# Patient Record
Sex: MALE | Race: WHITE | NOT HISPANIC OR LATINO
[De-identification: names, ages, dates, MRNs, and addresses within clinical notes are randomized per-mention and may not be internally consistent; named-entity substitution may affect disease eponyms.]

---

## 2017-03-28 ENCOUNTER — APPOINTMENT (OUTPATIENT)
Dept: ENDOCRINOLOGY | Facility: CLINIC | Age: 62
End: 2017-03-28

## 2017-03-28 VITALS
OXYGEN SATURATION: 96 % | SYSTOLIC BLOOD PRESSURE: 120 MMHG | BODY MASS INDEX: 33.47 KG/M2 | DIASTOLIC BLOOD PRESSURE: 82 MMHG | WEIGHT: 226 LBS | HEIGHT: 69 IN | HEART RATE: 82 BPM

## 2017-03-28 LAB
GLUCOSE BLDC GLUCOMTR-MCNC: 101
HBA1C MFR BLD HPLC: 6.9

## 2017-03-29 LAB
25(OH)D3 SERPL-MCNC: 17.3 NG/ML
ALBUMIN SERPL ELPH-MCNC: 4.6 G/DL
ALP BLD-CCNC: 82 U/L
ALT SERPL-CCNC: 25 U/L
ANION GAP SERPL CALC-SCNC: 17 MMOL/L
AST SERPL-CCNC: 18 U/L
BILIRUB SERPL-MCNC: 0.3 MG/DL
BUN SERPL-MCNC: 20 MG/DL
CALCIUM SERPL-MCNC: 9.7 MG/DL
CHLORIDE SERPL-SCNC: 102 MMOL/L
CHOLEST SERPL-MCNC: 249 MG/DL
CHOLEST/HDLC SERPL: 5.2 RATIO
CO2 SERPL-SCNC: 25 MMOL/L
CREAT SERPL-MCNC: 1.11 MG/DL
CREAT SPEC-SCNC: 87 MG/DL
GLUCOSE SERPL-MCNC: 96 MG/DL
HDLC SERPL-MCNC: 48 MG/DL
LDLC SERPL CALC-MCNC: 141 MG/DL
MICROALBUMIN 24H UR DL<=1MG/L-MCNC: 13.5 MG/DL
MICROALBUMIN/CREAT 24H UR-RTO: 156 UG/MG
POTASSIUM SERPL-SCNC: 4.9 MMOL/L
PROT SERPL-MCNC: 7.8 G/DL
SODIUM SERPL-SCNC: 144 MMOL/L
T4 FREE SERPL-MCNC: 1.5 NG/DL
THYROGLOB AB SERPL-ACNC: <20 IU/ML
THYROGLOB SERPL-MCNC: <0.2 NG/ML
TRIGL SERPL-MCNC: 300 MG/DL
TSH SERPL-ACNC: 1.75 UIU/ML

## 2017-10-03 ENCOUNTER — APPOINTMENT (OUTPATIENT)
Dept: ENDOCRINOLOGY | Facility: CLINIC | Age: 62
End: 2017-10-03
Payer: COMMERCIAL

## 2017-10-03 VITALS
SYSTOLIC BLOOD PRESSURE: 130 MMHG | WEIGHT: 225 LBS | OXYGEN SATURATION: 98 % | BODY MASS INDEX: 33.33 KG/M2 | HEART RATE: 61 BPM | HEIGHT: 69 IN | DIASTOLIC BLOOD PRESSURE: 84 MMHG

## 2017-10-03 LAB
GLUCOSE BLDC GLUCOMTR-MCNC: 110
HBA1C MFR BLD HPLC: 6.7

## 2017-10-03 PROCEDURE — 99214 OFFICE O/P EST MOD 30 MIN: CPT | Mod: 25

## 2017-10-03 PROCEDURE — 83036 HEMOGLOBIN GLYCOSYLATED A1C: CPT | Mod: QW

## 2017-10-03 PROCEDURE — 82962 GLUCOSE BLOOD TEST: CPT

## 2017-10-04 LAB
25(OH)D3 SERPL-MCNC: 21.2 NG/ML
ALBUMIN SERPL ELPH-MCNC: 4.4 G/DL
ALP BLD-CCNC: 69 U/L
ALT SERPL-CCNC: 17 U/L
ANION GAP SERPL CALC-SCNC: 14 MMOL/L
AST SERPL-CCNC: 15 U/L
BASOPHILS # BLD AUTO: 0.03 K/UL
BASOPHILS NFR BLD AUTO: 0.3 %
BILIRUB SERPL-MCNC: 0.4 MG/DL
BUN SERPL-MCNC: 22 MG/DL
CALCIUM SERPL-MCNC: 9.6 MG/DL
CHLORIDE SERPL-SCNC: 102 MMOL/L
CHOLEST SERPL-MCNC: 219 MG/DL
CHOLEST/HDLC SERPL: 5.2 RATIO
CO2 SERPL-SCNC: 25 MMOL/L
CREAT SERPL-MCNC: 0.97 MG/DL
CREAT SPEC-SCNC: 90 MG/DL
EOSINOPHIL # BLD AUTO: 0.15 K/UL
EOSINOPHIL NFR BLD AUTO: 1.7 %
GLUCOSE SERPL-MCNC: 101 MG/DL
HCT VFR BLD CALC: 47.2 %
HDLC SERPL-MCNC: 42 MG/DL
HGB BLD-MCNC: 15.9 G/DL
IMM GRANULOCYTES NFR BLD AUTO: 0.1 %
LDLC SERPL CALC-MCNC: 131 MG/DL
LYMPHOCYTES # BLD AUTO: 3.15 K/UL
LYMPHOCYTES NFR BLD AUTO: 36.1 %
MAN DIFF?: NORMAL
MCHC RBC-ENTMCNC: 29.6 PG
MCHC RBC-ENTMCNC: 33.7 GM/DL
MCV RBC AUTO: 87.7 FL
MICROALBUMIN 24H UR DL<=1MG/L-MCNC: 8 MG/DL
MICROALBUMIN/CREAT 24H UR-RTO: 89 MG/G
MONOCYTES # BLD AUTO: 0.78 K/UL
MONOCYTES NFR BLD AUTO: 8.9 %
NEUTROPHILS # BLD AUTO: 4.6 K/UL
NEUTROPHILS NFR BLD AUTO: 52.9 %
PLATELET # BLD AUTO: 306 K/UL
POTASSIUM SERPL-SCNC: 4.8 MMOL/L
PROT SERPL-MCNC: 7.8 G/DL
RBC # BLD: 5.38 M/UL
RBC # FLD: 13.8 %
SODIUM SERPL-SCNC: 141 MMOL/L
T4 FREE SERPL-MCNC: 1.3 NG/DL
THYROGLOB AB SERPL-ACNC: <20 IU/ML
THYROGLOB SERPL-MCNC: <0.2 NG/ML
TRIGL SERPL-MCNC: 229 MG/DL
TSH SERPL-ACNC: 5.79 UIU/ML
WBC # FLD AUTO: 8.72 K/UL

## 2017-10-13 LAB
T4 FREE SERPL-MCNC: 1.4 NG/DL
TSH SERPL-ACNC: 2.52 UIU/ML

## 2018-05-21 ENCOUNTER — MEDICATION RENEWAL (OUTPATIENT)
Age: 63
End: 2018-05-21

## 2018-05-21 ENCOUNTER — APPOINTMENT (OUTPATIENT)
Dept: ENDOCRINOLOGY | Facility: CLINIC | Age: 63
End: 2018-05-21
Payer: COMMERCIAL

## 2018-05-21 VITALS
OXYGEN SATURATION: 95 % | DIASTOLIC BLOOD PRESSURE: 82 MMHG | BODY MASS INDEX: 33.18 KG/M2 | WEIGHT: 224 LBS | HEART RATE: 65 BPM | HEIGHT: 69 IN | SYSTOLIC BLOOD PRESSURE: 120 MMHG

## 2018-05-21 LAB
GLUCOSE BLDC GLUCOMTR-MCNC: 142
HBA1C MFR BLD HPLC: 7.1

## 2018-05-21 PROCEDURE — 99214 OFFICE O/P EST MOD 30 MIN: CPT | Mod: 25

## 2018-05-21 PROCEDURE — 83036 HEMOGLOBIN GLYCOSYLATED A1C: CPT | Mod: QW

## 2018-05-21 PROCEDURE — 82962 GLUCOSE BLOOD TEST: CPT

## 2018-05-21 RX ORDER — BLOOD SUGAR DIAGNOSTIC
STRIP MISCELLANEOUS
Qty: 1 | Refills: 2 | Status: DISCONTINUED | COMMUNITY
Start: 2018-05-21 | End: 2018-05-21

## 2018-05-21 RX ORDER — LANCETS
EACH MISCELLANEOUS
Qty: 1 | Refills: 2 | Status: DISCONTINUED | COMMUNITY
Start: 2018-05-21 | End: 2018-05-21

## 2018-05-21 RX ORDER — BLOOD-GLUCOSE METER
KIT MISCELLANEOUS
Qty: 1 | Refills: 0 | Status: ACTIVE | COMMUNITY
Start: 2018-05-21 | End: 1900-01-01

## 2018-05-21 RX ORDER — BLOOD SUGAR DIAGNOSTIC
STRIP MISCELLANEOUS DAILY
Qty: 1 | Refills: 1 | Status: ACTIVE | COMMUNITY
Start: 2018-05-21 | End: 1900-01-01

## 2018-05-21 RX ORDER — LANCETS 28 GAUGE
EACH MISCELLANEOUS
Qty: 1 | Refills: 3 | Status: ACTIVE | COMMUNITY
Start: 2018-05-21 | End: 1900-01-01

## 2018-05-22 LAB
25(OH)D3 SERPL-MCNC: 23.8 NG/ML
ALBUMIN SERPL ELPH-MCNC: 4.9 G/DL
ALP BLD-CCNC: 70 U/L
ALT SERPL-CCNC: 20 U/L
ANION GAP SERPL CALC-SCNC: 14 MMOL/L
AST SERPL-CCNC: 15 U/L
BILIRUB SERPL-MCNC: 0.5 MG/DL
BUN SERPL-MCNC: 23 MG/DL
CALCIUM SERPL-MCNC: 10 MG/DL
CHLORIDE SERPL-SCNC: 102 MMOL/L
CHOLEST SERPL-MCNC: 189 MG/DL
CHOLEST/HDLC SERPL: 4.1 RATIO
CO2 SERPL-SCNC: 28 MMOL/L
CREAT SERPL-MCNC: 1 MG/DL
CREAT SPEC-SCNC: 142 MG/DL
GLUCOSE SERPL-MCNC: 121 MG/DL
HDLC SERPL-MCNC: 46 MG/DL
LDLC SERPL CALC-MCNC: 115 MG/DL
MICROALBUMIN 24H UR DL<=1MG/L-MCNC: 11.2 MG/DL
MICROALBUMIN/CREAT 24H UR-RTO: 79 MG/G
POTASSIUM SERPL-SCNC: 4.8 MMOL/L
PROT SERPL-MCNC: 7.7 G/DL
SODIUM SERPL-SCNC: 144 MMOL/L
T4 FREE SERPL-MCNC: 1.7 NG/DL
THYROGLOB AB SERPL-ACNC: <20 IU/ML
THYROGLOB SERPL-MCNC: <0.2 NG/ML
TRIGL SERPL-MCNC: 140 MG/DL
TSH SERPL-ACNC: 0.44 UIU/ML

## 2018-10-23 ENCOUNTER — APPOINTMENT (OUTPATIENT)
Dept: ENDOCRINOLOGY | Facility: CLINIC | Age: 63
End: 2018-10-23
Payer: COMMERCIAL

## 2018-10-23 VITALS
SYSTOLIC BLOOD PRESSURE: 138 MMHG | WEIGHT: 225.2 LBS | DIASTOLIC BLOOD PRESSURE: 86 MMHG | BODY MASS INDEX: 33.36 KG/M2 | HEART RATE: 75 BPM | HEIGHT: 69 IN | OXYGEN SATURATION: 96 %

## 2018-10-23 VITALS — SYSTOLIC BLOOD PRESSURE: 120 MMHG | DIASTOLIC BLOOD PRESSURE: 80 MMHG

## 2018-10-23 LAB
GLUCOSE BLDC GLUCOMTR-MCNC: 207
HBA1C MFR BLD HPLC: 7.3

## 2018-10-23 PROCEDURE — 99214 OFFICE O/P EST MOD 30 MIN: CPT | Mod: 25

## 2018-10-23 PROCEDURE — 82962 GLUCOSE BLOOD TEST: CPT

## 2018-10-23 PROCEDURE — 83036 HEMOGLOBIN GLYCOSYLATED A1C: CPT | Mod: QW

## 2018-10-24 LAB
25(OH)D3 SERPL-MCNC: 26.7 NG/ML
ALBUMIN SERPL ELPH-MCNC: 4.6 G/DL
ALP BLD-CCNC: 70 U/L
ALT SERPL-CCNC: 22 U/L
ANION GAP SERPL CALC-SCNC: 14 MMOL/L
AST SERPL-CCNC: 16 U/L
BASOPHILS # BLD AUTO: 0.03 K/UL
BASOPHILS NFR BLD AUTO: 0.3 %
BILIRUB SERPL-MCNC: 0.3 MG/DL
BUN SERPL-MCNC: 20 MG/DL
CALCIUM SERPL-MCNC: 9.8 MG/DL
CHLORIDE SERPL-SCNC: 104 MMOL/L
CHOLEST SERPL-MCNC: 191 MG/DL
CHOLEST/HDLC SERPL: 4.4 RATIO
CO2 SERPL-SCNC: 25 MMOL/L
CREAT SERPL-MCNC: 0.87 MG/DL
CREAT SPEC-SCNC: 122 MG/DL
EOSINOPHIL # BLD AUTO: 0.26 K/UL
EOSINOPHIL NFR BLD AUTO: 2.9 %
GLUCOSE SERPL-MCNC: 160 MG/DL
HCT VFR BLD CALC: 45 %
HDLC SERPL-MCNC: 43 MG/DL
HGB BLD-MCNC: 15.5 G/DL
IMM GRANULOCYTES NFR BLD AUTO: 0.1 %
LDLC SERPL CALC-MCNC: 114 MG/DL
LYMPHOCYTES # BLD AUTO: 2.68 K/UL
LYMPHOCYTES NFR BLD AUTO: 30 %
MAN DIFF?: NORMAL
MCHC RBC-ENTMCNC: 28.7 PG
MCHC RBC-ENTMCNC: 34.4 GM/DL
MCV RBC AUTO: 83.2 FL
MICROALBUMIN 24H UR DL<=1MG/L-MCNC: 11.1 MG/DL
MICROALBUMIN/CREAT 24H UR-RTO: 91 MG/G
MONOCYTES # BLD AUTO: 0.81 K/UL
MONOCYTES NFR BLD AUTO: 9.1 %
NEUTROPHILS # BLD AUTO: 5.15 K/UL
NEUTROPHILS NFR BLD AUTO: 57.6 %
PLATELET # BLD AUTO: 434 K/UL
POTASSIUM SERPL-SCNC: 5.1 MMOL/L
PROT SERPL-MCNC: 7.6 G/DL
RBC # BLD: 5.41 M/UL
RBC # FLD: 13.5 %
SODIUM SERPL-SCNC: 143 MMOL/L
T4 FREE SERPL-MCNC: 1.8 NG/DL
THYROGLOB AB SERPL-ACNC: <20 IU/ML
THYROGLOB SERPL-MCNC: <0.2 NG/ML
TRIGL SERPL-MCNC: 169 MG/DL
TSH SERPL-ACNC: 0.71 UIU/ML
WBC # FLD AUTO: 8.94 K/UL

## 2018-11-26 ENCOUNTER — APPOINTMENT (OUTPATIENT)
Dept: CT IMAGING | Facility: IMAGING CENTER | Age: 63
End: 2018-11-26
Payer: COMMERCIAL

## 2018-11-26 ENCOUNTER — OUTPATIENT (OUTPATIENT)
Dept: OUTPATIENT SERVICES | Facility: HOSPITAL | Age: 63
LOS: 1 days | End: 2018-11-26
Payer: COMMERCIAL

## 2018-11-26 DIAGNOSIS — Z00.8 ENCOUNTER FOR OTHER GENERAL EXAMINATION: ICD-10-CM

## 2018-11-26 PROCEDURE — 74178 CT ABD&PLV WO CNTR FLWD CNTR: CPT

## 2018-11-26 PROCEDURE — 74178 CT ABD&PLV WO CNTR FLWD CNTR: CPT | Mod: 26

## 2018-11-26 PROCEDURE — 82565 ASSAY OF CREATININE: CPT

## 2019-04-08 ENCOUNTER — APPOINTMENT (OUTPATIENT)
Dept: ENDOCRINOLOGY | Facility: CLINIC | Age: 64
End: 2019-04-08
Payer: COMMERCIAL

## 2019-04-08 VITALS
SYSTOLIC BLOOD PRESSURE: 110 MMHG | HEIGHT: 69 IN | HEART RATE: 65 BPM | DIASTOLIC BLOOD PRESSURE: 70 MMHG | WEIGHT: 227 LBS | BODY MASS INDEX: 33.62 KG/M2 | OXYGEN SATURATION: 96 %

## 2019-04-08 LAB
GLUCOSE BLDC GLUCOMTR-MCNC: 190
HBA1C MFR BLD HPLC: 8.3

## 2019-04-08 PROCEDURE — 99214 OFFICE O/P EST MOD 30 MIN: CPT | Mod: 25

## 2019-04-08 PROCEDURE — 83036 HEMOGLOBIN GLYCOSYLATED A1C: CPT | Mod: QW

## 2019-04-08 PROCEDURE — 82962 GLUCOSE BLOOD TEST: CPT

## 2019-04-08 NOTE — ASSESSMENT
[Carbohydrate Consistent Diet] : carbohydrate consistent diet [Diabetes Foot Care] : diabetes foot care [Long Term Vascular Complications] : long term vascular complications of diabetes [FreeTextEntry1] : 63 y.o. male with h/o Type 2 DM, hypothyroidism s/p surgery and I131 for papillary thyroid cancer. \par 1. Type 2 DM- Suboptimal control with Hba1c of 8.3%. Encouraged carbohydrate consistent diet and exercise. Will start Metformin  mg daily and titrate to 1,000 mg daily. Reviewed GI side effects. Recommend SMBG. Reviewed blood glucose targets.  Will check urine microalb/cr ratio and CMP.\par 2. BP is at goal and will continue Lisinopril for renal protection\par 3. Will check lipids and discussed statin for CV risk reduction. Patient declines statin at this time.\par 4. Hypothyroidism/papillary thyroid cancer- Clinically stable. Will check TFTs and Tg level. Goal TSH is below 1.5 to 2.0. Will adjust Synthroid accordingly. UTD with neck ultrasound in October 2016 was unremarkable and will repeat in 10/2019. \par 5. Vitamin D def- Will check 25 vitamin D level and adjust supplement if needed\par \par If stable, follow up in 3 to 4 months \par Follow up with podiatry and recommend treatment of tinea pedis\par  [Smoking Cessation] : smoking cessation

## 2019-04-08 NOTE — REVIEW OF SYSTEMS
[Negative] : Gastrointestinal [Pain/Numbness of Digits] : pain/numbness of digits [Polydipsia] : no polydipsia [Swelling] : no swelling

## 2019-04-08 NOTE — PHYSICAL EXAM
[Alert] : alert [No Acute Distress] : no acute distress [Normal Sclera/Conjunctiva] : normal sclera/conjunctiva [EOMI] : extra ocular movement intact [Supple] : the neck was supple [No LAD] : no lymphadenopathy [Well Healed Scar] : well healed scar [No Accessory Muscle Use] : no accessory muscle use [Clear to Auscultation] : lungs were clear to auscultation bilaterally [Normal S1, S2] : normal S1 and S2 [Regular Rhythm] : with a regular rhythm [Pedal Pulses Normal] : the pedal pulses are present [No Edema] : there was no peripheral edema [Normal Bowel Sounds] : normal bowel sounds [Not Tender] : non-tender [Soft] : abdomen soft [Not Distended] : not distended [No Clubbing, Cyanosis] : no clubbing  or cyanosis of the fingernails [No Rash] : no rash [Right Foot Was Examined] : right foot ~C was examined [Left Foot Was Examined] : left foot ~C was examined [Normal] : normal [2+] : 2+ in the dorsalis pedis [Normal Reflexes] : deep tendon reflexes were 2+ and symmetric [Normal Affect] : the affect was normal [Normal Mood] : the mood was normal [Diminished Throughout Both Feet] : normal tactile sensation with monofilament testing throughout both feet [FreeTextEntry1] : callus [FreeTextEntry2] : tinea pedis [FreeTextEntry5] : callus [FreeTextEntry6] : great toe onychomycosis and tinea pedis

## 2019-04-08 NOTE — HISTORY OF PRESENT ILLNESS
[FreeTextEntry1] : 63 y.o. male with h/o hypothyroidism s/p surgery for papillary thyroid cancer and Type 2 DM here for follow up visit. \par \par In regards to DM, no polyuria and no polydipsia. Not monitoring FS at home. Diet could better in regards to portions. Enjoys wine especially while in [place] recently.  UTD with optho and no retinopathy. Reports foot pain and using inserts. Does have podiatrist. Taking ACE-I for proteinuria but does forget sometimes. For breakfast, scooped out bagel with cream cheese and coffee with no sugar. For lunch  pizza or hamburger but sometimes skips. For dinner: Less pasta and rice. No sodas or juices. Likes fruits at night. Stop smoking for a few months but now smoking again. Does lots of walking. \par \par In regards to thyroid disease, taking Synthroid 175 mcg daily. No neck complaints. Had unremarkable neck ultrasound on 10/14/16. Had total thyroidectomy and  I131 in 1999. \par \par In regards to vitamin D def, was taking vitamin D3 2,000 Iu daily but ran out 2 to 3 months ago.

## 2019-04-09 LAB
25(OH)D3 SERPL-MCNC: 22.3 NG/ML
ALBUMIN SERPL ELPH-MCNC: 4.6 G/DL
ALP BLD-CCNC: 77 U/L
ALT SERPL-CCNC: 20 U/L
ANION GAP SERPL CALC-SCNC: 11 MMOL/L
AST SERPL-CCNC: 20 U/L
BASOPHILS # BLD AUTO: 0.05 K/UL
BASOPHILS NFR BLD AUTO: 0.6 %
BILIRUB SERPL-MCNC: 0.5 MG/DL
BUN SERPL-MCNC: 16 MG/DL
CALCIUM SERPL-MCNC: 9.8 MG/DL
CHLORIDE SERPL-SCNC: 103 MMOL/L
CHOLEST SERPL-MCNC: 193 MG/DL
CHOLEST/HDLC SERPL: 4.2 RATIO
CO2 SERPL-SCNC: 26 MMOL/L
CREAT SERPL-MCNC: 0.93 MG/DL
CREAT SPEC-SCNC: 169 MG/DL
EOSINOPHIL # BLD AUTO: 0.3 K/UL
EOSINOPHIL NFR BLD AUTO: 3.7 %
GLUCOSE SERPL-MCNC: 187 MG/DL
HCT VFR BLD CALC: 47.7 %
HDLC SERPL-MCNC: 46 MG/DL
HGB BLD-MCNC: 15.6 G/DL
IMM GRANULOCYTES NFR BLD AUTO: 0.1 %
LDLC SERPL CALC-MCNC: 123 MG/DL
LYMPHOCYTES # BLD AUTO: 2.71 K/UL
LYMPHOCYTES NFR BLD AUTO: 33.3 %
MAN DIFF?: NORMAL
MCHC RBC-ENTMCNC: 29 PG
MCHC RBC-ENTMCNC: 32.7 GM/DL
MCV RBC AUTO: 88.7 FL
MICROALBUMIN 24H UR DL<=1MG/L-MCNC: 10 MG/DL
MICROALBUMIN/CREAT 24H UR-RTO: 59 MG/G
MONOCYTES # BLD AUTO: 0.87 K/UL
MONOCYTES NFR BLD AUTO: 10.7 %
NEUTROPHILS # BLD AUTO: 4.2 K/UL
NEUTROPHILS NFR BLD AUTO: 51.6 %
PLATELET # BLD AUTO: 313 K/UL
POTASSIUM SERPL-SCNC: 5 MMOL/L
PROT SERPL-MCNC: 7.3 G/DL
RBC # BLD: 5.38 M/UL
RBC # FLD: 13.1 %
SODIUM SERPL-SCNC: 140 MMOL/L
T4 FREE SERPL-MCNC: 1.6 NG/DL
THYROGLOB AB SERPL-ACNC: <20 IU/ML
THYROGLOB SERPL-MCNC: <0.2 NG/ML
TRIGL SERPL-MCNC: 120 MG/DL
TSH SERPL-ACNC: 0.5 UIU/ML
WBC # FLD AUTO: 8.14 K/UL

## 2019-05-28 ENCOUNTER — INBOUND DOCUMENT (OUTPATIENT)
Age: 64
End: 2019-05-28

## 2019-10-02 ENCOUNTER — RX RENEWAL (OUTPATIENT)
Age: 64
End: 2019-10-02

## 2019-10-25 ENCOUNTER — APPOINTMENT (OUTPATIENT)
Dept: ENDOCRINOLOGY | Facility: CLINIC | Age: 64
End: 2019-10-25
Payer: COMMERCIAL

## 2019-10-25 VITALS
WEIGHT: 223 LBS | OXYGEN SATURATION: 98 % | HEART RATE: 74 BPM | DIASTOLIC BLOOD PRESSURE: 70 MMHG | BODY MASS INDEX: 33.03 KG/M2 | HEIGHT: 69 IN | SYSTOLIC BLOOD PRESSURE: 120 MMHG

## 2019-10-25 LAB
GLUCOSE BLDC GLUCOMTR-MCNC: 173
HBA1C MFR BLD HPLC: 6.9

## 2019-10-25 PROCEDURE — 83036 HEMOGLOBIN GLYCOSYLATED A1C: CPT | Mod: QW

## 2019-10-25 PROCEDURE — 82962 GLUCOSE BLOOD TEST: CPT

## 2019-10-25 PROCEDURE — 99214 OFFICE O/P EST MOD 30 MIN: CPT

## 2019-10-25 NOTE — HISTORY OF PRESENT ILLNESS
[FreeTextEntry1] : 63 y.o. male with h/o hypothyroidism s/p surgery for papillary thyroid cancer and Type 2 DM here for follow up visit. \par \par In regards to DM, no polyuria and no polydipsia. Not monitoring FS at home. Taking Metformin ER 1,000 mg daily. Diet could better in regards to portions. Enjoys wine especially while in [place].  UTD with optho and no retinopathy. Reports foot pain and using inserts. Does have podiatrist. Taking ACE-I for proteinuria but does forget sometimes. For breakfast, no more bagles and has rye with ege whites and coffee with no sugar. For lunch:  pizza or hamburger but sometimes skips. For dinner: Less pasta and rice. No sodas or juices. Likes fruits at night. Still smoking. Does lots of walking. \par \par In regards to thyroid disease, taking Synthroid 175 mcg daily. No neck complaints. Had unremarkable neck ultrasound on 10/14/16. Had total thyroidectomy and  I131 in 1999. \par \par In regards to vitamin D def, was taking vitamin D3 2,000 Iu daily but ran out 6 months ago.

## 2019-10-25 NOTE — PHYSICAL EXAM
[Alert] : alert [No Acute Distress] : no acute distress [EOMI] : extra ocular movement intact [Supple] : the neck was supple [Normal Sclera/Conjunctiva] : normal sclera/conjunctiva [Well Healed Scar] : well healed scar [No LAD] : no lymphadenopathy [Normal S1, S2] : normal S1 and S2 [No Accessory Muscle Use] : no accessory muscle use [Clear to Auscultation] : lungs were clear to auscultation bilaterally [Pedal Pulses Normal] : the pedal pulses are present [Regular Rhythm] : with a regular rhythm [No Edema] : there was no peripheral edema [Normal Bowel Sounds] : normal bowel sounds [Not Distended] : not distended [Soft] : abdomen soft [Not Tender] : non-tender [No Clubbing, Cyanosis] : no clubbing  or cyanosis of the fingernails [No Rash] : no rash [Right Foot Was Examined] : right foot ~C was examined [Left Foot Was Examined] : left foot ~C was examined [Normal] : normal [2+] : 2+ in the dorsalis pedis [Diminished Throughout Both Feet] : normal tactile sensation with monofilament testing throughout both feet [Normal Reflexes] : deep tendon reflexes were 2+ and symmetric [Normal Mood] : the mood was normal [Normal Affect] : the affect was normal [FreeTextEntry2] : tinea pedis [FreeTextEntry1] : callus [FreeTextEntry5] : callus [FreeTextEntry6] : great toe onychomycosis and tinea pedis

## 2019-10-25 NOTE — ASSESSMENT
[FreeTextEntry1] : 64 y.o. male with h/o Type 2 DM, hypothyroidism s/p surgery and I131 for papillary thyroid cancer. \par 1. Type 2 DM- Good control with Hba1c of 6.9% today. Encouraged carbohydrate consistent diet and exercise. Will continue Metformin ER 1,000 mg daily. Reviewed GI side effects. Recommend SMBG. Reviewed blood glucose targets.  Will check urine microalb/cr ratio and CMP.\par 2. BP is at goal and will continue Lisinopril for renal protection\par 3. Will check lipids and discussed statin for CV risk reduction. Patient declines statin at this time.\par 4. Hypothyroidism/papillary thyroid cancer- Clinically stable. Will check TFTs and Tg level. Goal TSH is below 1.5 to 2.0. Will adjust Synthroid accordingly. UTD with neck ultrasound in October 2016 was unremarkable and will repeat ultrasound now. \par 5. Vitamin D def- Will check 25 vitamin D level and adjust supplement if needed\par \par If stable, follow up in 6 months \par Follow up with podiatry and recommend treatment of tinea pedis\par  [Diabetes Foot Care] : diabetes foot care [Carbohydrate Consistent Diet] : carbohydrate consistent diet [Smoking Cessation] : smoking cessation [Long Term Vascular Complications] : long term vascular complications of diabetes

## 2019-10-25 NOTE — REVIEW OF SYSTEMS
[Recent Weight Loss (___ Lbs)] : recent [unfilled] ~Ulb weight loss [Constipation] : constipation [Pain/Numbness of Digits] : pain/numbness of digits [Polydipsia] : no polydipsia [Swelling] : no swelling [Negative] : Respiratory

## 2019-10-28 LAB
25(OH)D3 SERPL-MCNC: 16.9 NG/ML
ALBUMIN SERPL ELPH-MCNC: 4.7 G/DL
ALP BLD-CCNC: 88 U/L
ALT SERPL-CCNC: 18 U/L
ANION GAP SERPL CALC-SCNC: 15 MMOL/L
AST SERPL-CCNC: 16 U/L
BASOPHILS # BLD AUTO: 0.05 K/UL
BASOPHILS NFR BLD AUTO: 0.6 %
BILIRUB SERPL-MCNC: 0.2 MG/DL
BUN SERPL-MCNC: 21 MG/DL
CALCIUM SERPL-MCNC: 9.3 MG/DL
CHLORIDE SERPL-SCNC: 103 MMOL/L
CHOLEST SERPL-MCNC: 176 MG/DL
CHOLEST/HDLC SERPL: 4.5 RATIO
CO2 SERPL-SCNC: 22 MMOL/L
CREAT SERPL-MCNC: 0.8 MG/DL
CREAT SPEC-SCNC: 86 MG/DL
EOSINOPHIL # BLD AUTO: 0.37 K/UL
EOSINOPHIL NFR BLD AUTO: 4.6 %
GLUCOSE SERPL-MCNC: 180 MG/DL
HCT VFR BLD CALC: 44.6 %
HDLC SERPL-MCNC: 39 MG/DL
HGB BLD-MCNC: 15.1 G/DL
IMM GRANULOCYTES NFR BLD AUTO: 0.2 %
LDLC SERPL CALC-MCNC: 85 MG/DL
LYMPHOCYTES # BLD AUTO: 2.3 K/UL
LYMPHOCYTES NFR BLD AUTO: 28.6 %
MAN DIFF?: NORMAL
MCHC RBC-ENTMCNC: 29.2 PG
MCHC RBC-ENTMCNC: 33.9 GM/DL
MCV RBC AUTO: 86.1 FL
MICROALBUMIN 24H UR DL<=1MG/L-MCNC: 6.4 MG/DL
MICROALBUMIN/CREAT 24H UR-RTO: 74 MG/G
MONOCYTES # BLD AUTO: 0.79 K/UL
MONOCYTES NFR BLD AUTO: 9.8 %
NEUTROPHILS # BLD AUTO: 4.52 K/UL
NEUTROPHILS NFR BLD AUTO: 56.2 %
PLATELET # BLD AUTO: 273 K/UL
POTASSIUM SERPL-SCNC: 4.6 MMOL/L
PROT SERPL-MCNC: 7.3 G/DL
RBC # BLD: 5.18 M/UL
RBC # FLD: 12.8 %
SODIUM SERPL-SCNC: 140 MMOL/L
T4 FREE SERPL-MCNC: 1.4 NG/DL
THYROGLOB AB SERPL-ACNC: <20 IU/ML
THYROGLOB SERPL-MCNC: <0.2 NG/ML
TRIGL SERPL-MCNC: 262 MG/DL
TSH SERPL-ACNC: 0.32 UIU/ML
WBC # FLD AUTO: 8.05 K/UL

## 2019-12-03 ENCOUNTER — FORM ENCOUNTER (OUTPATIENT)
Age: 64
End: 2019-12-03

## 2019-12-04 ENCOUNTER — APPOINTMENT (OUTPATIENT)
Dept: ULTRASOUND IMAGING | Facility: IMAGING CENTER | Age: 64
End: 2019-12-04
Payer: COMMERCIAL

## 2019-12-04 ENCOUNTER — OUTPATIENT (OUTPATIENT)
Dept: OUTPATIENT SERVICES | Facility: HOSPITAL | Age: 64
LOS: 1 days | End: 2019-12-04
Payer: COMMERCIAL

## 2019-12-04 DIAGNOSIS — C73 MALIGNANT NEOPLASM OF THYROID GLAND: ICD-10-CM

## 2019-12-04 PROCEDURE — 76536 US EXAM OF HEAD AND NECK: CPT | Mod: 26

## 2019-12-04 PROCEDURE — 76536 US EXAM OF HEAD AND NECK: CPT

## 2020-03-12 ENCOUNTER — RX RENEWAL (OUTPATIENT)
Age: 65
End: 2020-03-12

## 2020-05-20 ENCOUNTER — APPOINTMENT (OUTPATIENT)
Dept: ENDOCRINOLOGY | Facility: CLINIC | Age: 65
End: 2020-05-20

## 2020-06-30 ENCOUNTER — APPOINTMENT (OUTPATIENT)
Dept: ENDOCRINOLOGY | Facility: CLINIC | Age: 65
End: 2020-06-30
Payer: COMMERCIAL

## 2020-06-30 VITALS
DIASTOLIC BLOOD PRESSURE: 76 MMHG | BODY MASS INDEX: 32.3 KG/M2 | OXYGEN SATURATION: 96 % | HEART RATE: 70 BPM | SYSTOLIC BLOOD PRESSURE: 115 MMHG | TEMPERATURE: 98.4 F | HEIGHT: 69 IN | WEIGHT: 218.06 LBS | RESPIRATION RATE: 17 BRPM

## 2020-06-30 DIAGNOSIS — Z20.828 CONTACT WITH AND (SUSPECTED) EXPOSURE TO OTHER VIRAL COMMUNICABLE DISEASES: ICD-10-CM

## 2020-06-30 LAB — HBA1C MFR BLD HPLC: 7.6

## 2020-06-30 PROCEDURE — 83036 HEMOGLOBIN GLYCOSYLATED A1C: CPT | Mod: QW

## 2020-06-30 PROCEDURE — 36415 COLL VENOUS BLD VENIPUNCTURE: CPT

## 2020-06-30 PROCEDURE — 99214 OFFICE O/P EST MOD 30 MIN: CPT | Mod: 25

## 2020-06-30 NOTE — HISTORY OF PRESENT ILLNESS
[FreeTextEntry1] : 64 y.o. male with h/o hypothyroidism s/p surgery for papillary thyroid cancer and Type 2 DM here for follow up visit. Reports hematuria and following with urology. \par \par In regards to DM, no polyuria and no polydipsia. Not monitoring FS at home. Taking Metformin ER 1,000 mg daily but may miss 2 to 3 times per week. Diet could better in regards to portions. Enjoys wine especially while in [place].  UTD with ophthalmology and no retinopathy. Reports foot pain and using inserts. Does have podiatrist. Taking ACE-I for proteinuria but does forget sometimes. For breakfast, no more bagels and has rye with egg whites and coffee with no sugar. For lunch:  pizza or hamburger but sometimes skips. For dinner: Less pasta and rice. No sodas or juices. Does drink wine. Likes fruits at night. Still smoking. Does lots of walking. \par \par In regards to thyroid disease, taking Synthroid 175 mcg daily. No neck complaints. Had unremarkable neck ultrasound on 12/4/19. Had total thyroidectomy and  I131 in 1999. \par \par In regards to vitamin D def, was taking vitamin D3 2,000 Iu daily but getting more sun

## 2020-06-30 NOTE — PHYSICAL EXAM
[Alert] : alert [Normal Sclera/Conjunctiva] : normal sclera/conjunctiva [No Acute Distress] : no acute distress [EOMI] : extra ocular movement intact [No LAD] : no lymphadenopathy [Well Healed Scar] : well healed scar [No Respiratory Distress] : no respiratory distress [Clear to Auscultation] : lungs were clear to auscultation bilaterally [Regular Rhythm] : with a regular rhythm [Normal S1, S2] : normal S1 and S2 [No Edema] : no peripheral edema [Pedal Pulses Normal] : the pedal pulses are present [Normal Bowel Sounds] : normal bowel sounds [Not Tender] : non-tender [Normal Anterior Cervical Nodes] : no anterior cervical lymphadenopathy [Not Distended] : not distended [Soft] : abdomen soft [No Clubbing, Cyanosis] : no clubbing  or cyanosis of the fingernails [No Rash] : no rash [Left Foot Was Examined] : left foot ~C was examined [Right Foot Was Examined] : right foot ~C was examined [2+] : 2+ in the dorsalis pedis [Diminished Throughout Both Feet] : normal tactile sensation with monofilament testing throughout both feet [Normal Reflexes] : deep tendon reflexes were 2+ and symmetric [Normal Mood] : the mood was normal [Normal Affect] : the affect was normal [FreeTextEntry1] : callus [FreeTextEntry2] : tinea pedis [FreeTextEntry5] : callus [FreeTextEntry6] : tinea pedis

## 2020-06-30 NOTE — ASSESSMENT
[FreeTextEntry1] : 64 y.o. male with h/o Type 2 DM, hypothyroidism s/p surgery and I131 for papillary thyroid cancer. \par 1. Type 2 DM- Fair control with Hba1c of 7.6% today. Encouraged carbohydrate consistent diet and exercise. Needs to limit fruit intake. Will continue Metformin ER 1,000 mg daily and stress compliance. Reviewed GI side effects. Recommend SMBG. Reviewed blood glucose targets.  Will check urine microalb/cr ratio and CMP.\par 2. BP is at goal and will continue Lisinopril for renal protection\par 3. Will check lipids and discussed statin for CV risk reduction. Patient declines statin at this time.\par 4. Hypothyroidism/papillary thyroid cancer- Clinically stable. Will check TFTs and Tg level. Goal TSH is below 1.5 to 2.0. Will adjust Synthroid accordingly. UTD with neck ultrasound in December 2019 was unremarkable and will repeat ultrasound in 5 years. \par 5. Vitamin D def- Will check 25 vitamin D level and adjust supplement if needed\par \par If stable, follow up in 6 months \par Follow up with podiatry and recommend treatment of tinea pedis\par Follow up with ophthalmology\par Labs drawn in the office [Carbohydrate Consistent Diet] : carbohydrate consistent diet [Long Term Vascular Complications] : long term vascular complications of diabetes [Diabetes Foot Care] : diabetes foot care [Smoking Cessation] : smoking cessation

## 2020-07-01 LAB
25(OH)D3 SERPL-MCNC: 18.9 NG/ML
ALBUMIN SERPL ELPH-MCNC: 4.8 G/DL
ALP BLD-CCNC: 74 U/L
ALT SERPL-CCNC: 19 U/L
ANION GAP SERPL CALC-SCNC: 16 MMOL/L
AST SERPL-CCNC: 15 U/L
BASOPHILS # BLD AUTO: 0.06 K/UL
BASOPHILS NFR BLD AUTO: 0.8 %
BILIRUB SERPL-MCNC: 0.4 MG/DL
BUN SERPL-MCNC: 23 MG/DL
CALCIUM SERPL-MCNC: 9.7 MG/DL
CHLORIDE SERPL-SCNC: 103 MMOL/L
CHOLEST SERPL-MCNC: 186 MG/DL
CHOLEST/HDLC SERPL: 4.4 RATIO
CO2 SERPL-SCNC: 22 MMOL/L
CREAT SERPL-MCNC: 0.85 MG/DL
CREAT SPEC-SCNC: 137 MG/DL
EOSINOPHIL # BLD AUTO: 0.31 K/UL
EOSINOPHIL NFR BLD AUTO: 4.3 %
GLUCOSE SERPL-MCNC: 109 MG/DL
HCT VFR BLD CALC: 49.7 %
HDLC SERPL-MCNC: 42 MG/DL
HGB BLD-MCNC: 15.5 G/DL
IMM GRANULOCYTES NFR BLD AUTO: 0 %
LDLC SERPL CALC-MCNC: 113 MG/DL
LYMPHOCYTES # BLD AUTO: 2.59 K/UL
LYMPHOCYTES NFR BLD AUTO: 36.2 %
MAN DIFF?: NORMAL
MCHC RBC-ENTMCNC: 28.7 PG
MCHC RBC-ENTMCNC: 31.2 GM/DL
MCV RBC AUTO: 91.9 FL
MICROALBUMIN 24H UR DL<=1MG/L-MCNC: 5.5 MG/DL
MICROALBUMIN/CREAT 24H UR-RTO: 40 MG/G
MONOCYTES # BLD AUTO: 0.61 K/UL
MONOCYTES NFR BLD AUTO: 8.5 %
NEUTROPHILS # BLD AUTO: 3.59 K/UL
NEUTROPHILS NFR BLD AUTO: 50.2 %
PLATELET # BLD AUTO: 292 K/UL
POTASSIUM SERPL-SCNC: 4.5 MMOL/L
PROT SERPL-MCNC: 7.4 G/DL
RBC # BLD: 5.41 M/UL
RBC # FLD: 13.7 %
SARS-COV-2 IGG SERPL IA-ACNC: <0.1 INDEX
SARS-COV-2 IGG SERPL QL IA: NEGATIVE
SODIUM SERPL-SCNC: 141 MMOL/L
T4 FREE SERPL-MCNC: 1.6 NG/DL
THYROGLOB AB SERPL-ACNC: <20 IU/ML
THYROGLOB SERPL-MCNC: <0.2 NG/ML
TRIGL SERPL-MCNC: 151 MG/DL
TSH SERPL-ACNC: 0.19 UIU/ML
VIT B12 SERPL-MCNC: 348 PG/ML
WBC # FLD AUTO: 7.16 K/UL

## 2020-07-06 LAB

## 2020-07-07 ENCOUNTER — OUTPATIENT (OUTPATIENT)
Dept: OUTPATIENT SERVICES | Facility: HOSPITAL | Age: 65
LOS: 1 days | End: 2020-07-07
Payer: COMMERCIAL

## 2020-07-07 ENCOUNTER — APPOINTMENT (OUTPATIENT)
Dept: CT IMAGING | Facility: IMAGING CENTER | Age: 65
End: 2020-07-07
Payer: COMMERCIAL

## 2020-07-07 DIAGNOSIS — Z00.8 ENCOUNTER FOR OTHER GENERAL EXAMINATION: ICD-10-CM

## 2020-07-07 PROCEDURE — 74178 CT ABD&PLV WO CNTR FLWD CNTR: CPT

## 2020-07-07 PROCEDURE — 74178 CT ABD&PLV WO CNTR FLWD CNTR: CPT | Mod: 26

## 2020-08-28 ENCOUNTER — RX RENEWAL (OUTPATIENT)
Age: 65
End: 2020-08-28

## 2020-12-22 ENCOUNTER — APPOINTMENT (OUTPATIENT)
Dept: ENDOCRINOLOGY | Facility: CLINIC | Age: 65
End: 2020-12-22
Payer: MEDICARE

## 2020-12-22 VITALS
HEIGHT: 69 IN | DIASTOLIC BLOOD PRESSURE: 83 MMHG | BODY MASS INDEX: 32.44 KG/M2 | SYSTOLIC BLOOD PRESSURE: 138 MMHG | TEMPERATURE: 98.3 F | HEART RATE: 62 BPM | RESPIRATION RATE: 16 BRPM | OXYGEN SATURATION: 97 % | WEIGHT: 219.04 LBS

## 2020-12-22 VITALS — SYSTOLIC BLOOD PRESSURE: 120 MMHG | DIASTOLIC BLOOD PRESSURE: 70 MMHG

## 2020-12-22 LAB — HBA1C MFR BLD HPLC: 6.9

## 2020-12-22 PROCEDURE — 36415 COLL VENOUS BLD VENIPUNCTURE: CPT

## 2020-12-22 PROCEDURE — 83036 HEMOGLOBIN GLYCOSYLATED A1C: CPT | Mod: QW

## 2020-12-22 PROCEDURE — 99214 OFFICE O/P EST MOD 30 MIN: CPT | Mod: 25

## 2020-12-22 NOTE — PHYSICAL EXAM
[Alert] : alert [No Acute Distress] : no acute distress [Normal Sclera/Conjunctiva] : normal sclera/conjunctiva [EOMI] : extra ocular movement intact [No LAD] : no lymphadenopathy [Well Healed Scar] : well healed scar [No Respiratory Distress] : no respiratory distress [Clear to Auscultation] : lungs were clear to auscultation bilaterally [Normal S1, S2] : normal S1 and S2 [Regular Rhythm] : with a regular rhythm [No Edema] : no peripheral edema [Pedal Pulses Normal] : the pedal pulses are present [Normal Bowel Sounds] : normal bowel sounds [Not Tender] : non-tender [Not Distended] : not distended [Soft] : abdomen soft [Normal Anterior Cervical Nodes] : no anterior cervical lymphadenopathy [No Clubbing, Cyanosis] : no clubbing  or cyanosis of the fingernails [No Rash] : no rash [Right Foot Was Examined] : right foot ~C was examined [Left Foot Was Examined] : left foot ~C was examined [2+] : 2+ in the dorsalis pedis [Normal Reflexes] : deep tendon reflexes were 2+ and symmetric [Normal Affect] : the affect was normal [Normal Mood] : the mood was normal [Diminished Throughout Both Feet] : normal tactile sensation with monofilament testing throughout both feet [FreeTextEntry1] : callus [FreeTextEntry2] : tinea pedis [FreeTextEntry5] : callus [FreeTextEntry6] : tinea pedis

## 2020-12-22 NOTE — REVIEW OF SYSTEMS
[Constipation] : constipation [Pain/Numbness of Digits] : pain/numbness of digits [Negative] : Respiratory [Recent Weight Gain (___ Lbs)] : no recent weight gain [Recent Weight Loss (___ Lbs)] : no recent weight loss [Polydipsia] : no polydipsia [Swelling] : no swelling

## 2020-12-22 NOTE — HISTORY OF PRESENT ILLNESS
[FreeTextEntry1] : 65 y.o. male with h/o hypothyroidism s/p surgery for papillary thyroid cancer and Type 2 DM here for follow up visit. No acute events since last visit. \par \par In regards to Type 2 DM, no polyuria and no polydipsia. Not monitoring FS at home. Taking Metformin ER 1,000 mg daily. Diet is better. Enjoys wine especially while in [place].  UTD with ophthalmology (12/14/2020) and no retinopathy. Had foot pain and using inserts. Does have podiatrist. Taking ACE-I for proteinuria but ran out for 2 days. For breakfast, no more bagels and has rye with egg whites and coffee with no sugar. For lunch: pizza rarely or hamburger but sometimes skips. For dinner: Less pasta and rice. No sodas or juices. Does drink wine. Likes fruits at night with occasional sweets. Still smoking. Does lots of walking. \par \par In regards to thyroid disease, taking Synthroid 175 mcg daily. No neck complaints. Had unremarkable neck ultrasound on 12/4/19. Had total thyroidectomy and  I131 in 1999. No SOB or palpitations. \par \par In regards to vitamin D def, was taking vitamin D3 2,000 Iu daily but getting more sun

## 2020-12-23 LAB
25(OH)D3 SERPL-MCNC: 16.7 NG/ML
ALBUMIN SERPL ELPH-MCNC: 4.7 G/DL
ALP BLD-CCNC: 80 U/L
ALT SERPL-CCNC: 19 U/L
ANION GAP SERPL CALC-SCNC: 13 MMOL/L
AST SERPL-CCNC: 16 U/L
BASOPHILS # BLD AUTO: 0.05 K/UL
BASOPHILS NFR BLD AUTO: 0.7 %
BILIRUB SERPL-MCNC: 0.3 MG/DL
BUN SERPL-MCNC: 19 MG/DL
CALCIUM SERPL-MCNC: 9.6 MG/DL
CHLORIDE SERPL-SCNC: 102 MMOL/L
CHOLEST SERPL-MCNC: 199 MG/DL
CO2 SERPL-SCNC: 24 MMOL/L
CREAT SERPL-MCNC: 1.01 MG/DL
CREAT SPEC-SCNC: 49 MG/DL
EOSINOPHIL # BLD AUTO: 0.53 K/UL
EOSINOPHIL NFR BLD AUTO: 7.5 %
GLUCOSE SERPL-MCNC: 90 MG/DL
HCT VFR BLD CALC: 50.5 %
HDLC SERPL-MCNC: 48 MG/DL
HGB BLD-MCNC: 15.7 G/DL
IMM GRANULOCYTES NFR BLD AUTO: 0.1 %
LDLC SERPL CALC-MCNC: 116 MG/DL
LYMPHOCYTES # BLD AUTO: 2.48 K/UL
LYMPHOCYTES NFR BLD AUTO: 35.3 %
MAN DIFF?: NORMAL
MCHC RBC-ENTMCNC: 28.5 PG
MCHC RBC-ENTMCNC: 31.1 GM/DL
MCV RBC AUTO: 91.7 FL
MICROALBUMIN 24H UR DL<=1MG/L-MCNC: 1.9 MG/DL
MICROALBUMIN/CREAT 24H UR-RTO: 39 MG/G
MONOCYTES # BLD AUTO: 0.65 K/UL
MONOCYTES NFR BLD AUTO: 9.3 %
NEUTROPHILS # BLD AUTO: 3.3 K/UL
NEUTROPHILS NFR BLD AUTO: 47.1 %
NONHDLC SERPL-MCNC: 151 MG/DL
PLATELET # BLD AUTO: 326 K/UL
POTASSIUM SERPL-SCNC: 4.9 MMOL/L
PROT SERPL-MCNC: 7.4 G/DL
RBC # BLD: 5.51 M/UL
RBC # FLD: 13.7 %
SODIUM SERPL-SCNC: 140 MMOL/L
T4 FREE SERPL-MCNC: 1.8 NG/DL
THYROGLOB AB SERPL-ACNC: <20 IU/ML
THYROGLOB SERPL-MCNC: <0.2 NG/ML
TRIGL SERPL-MCNC: 173 MG/DL
TSH SERPL-ACNC: 0.13 UIU/ML
VIT B12 SERPL-MCNC: 314 PG/ML
WBC # FLD AUTO: 7.02 K/UL

## 2021-07-15 ENCOUNTER — RX RENEWAL (OUTPATIENT)
Age: 66
End: 2021-07-15

## 2021-08-03 ENCOUNTER — APPOINTMENT (OUTPATIENT)
Dept: ENDOCRINOLOGY | Facility: CLINIC | Age: 66
End: 2021-08-03
Payer: MEDICARE

## 2021-08-03 VITALS
TEMPERATURE: 98.2 F | DIASTOLIC BLOOD PRESSURE: 77 MMHG | SYSTOLIC BLOOD PRESSURE: 129 MMHG | HEART RATE: 67 BPM | WEIGHT: 218 LBS | HEIGHT: 69 IN | BODY MASS INDEX: 32.29 KG/M2 | OXYGEN SATURATION: 95 %

## 2021-08-03 LAB — HBA1C MFR BLD HPLC: 7.1

## 2021-08-03 PROCEDURE — 83036 HEMOGLOBIN GLYCOSYLATED A1C: CPT | Mod: QW

## 2021-08-03 PROCEDURE — 99214 OFFICE O/P EST MOD 30 MIN: CPT | Mod: 25

## 2021-08-03 PROCEDURE — 36415 COLL VENOUS BLD VENIPUNCTURE: CPT

## 2021-08-03 NOTE — ASSESSMENT
[Carbohydrate Consistent Diet] : carbohydrate consistent diet [Diabetes Foot Care] : diabetes foot care [Long Term Vascular Complications] : long term vascular complications of diabetes [Smoking Cessation] : smoking cessation [FreeTextEntry1] : 65 y.o. male with h/o Type 2 DM, hypothyroidism s/p surgery and I131 for papillary thyroid cancer. \par \par 1. Type 2 DM- Good control with Hba1c of 7.1% today. Encouraged carbohydrate consistent diet and exercise. Needs to limit fruit intake. Will continue Metformin ER 1,000 mg daily and stress compliance. Recommend SMBG. Reviewed blood glucose targets.  Will check urine microalb/cr ratio and CMP.\par \par 2. BP is at goal and will continue Lisinopril for renal protection\par \par 3. Will check lipids and discussed statin for CV risk reduction. Patient declines statin at this time.\par \par 4. Hypothyroidism/papillary thyroid cancer- Clinically stable. Will check TFTs and Tg level. Goal TSH is below 1.5 to 2.0. Will adjust Synthroid accordingly. UTD with neck ultrasound in December 2019 was unremarkable and will repeat ultrasound in 5 years. \par \par 5. Vitamin D def- Will check 25 vitamin D level and adjust supplement if needed\par \par If stable, follow up in 6 months \par Follow up with podiatry and recommend treatment of tinea pedis\par Labs drawn in the office

## 2021-08-03 NOTE — HISTORY OF PRESENT ILLNESS
[FreeTextEntry1] : 65 y.o. male with h/o hypothyroidism s/p surgery for papillary thyroid cancer and Type 2 DM here for follow up visit. No acute events since last visit. \par \par In regards to Type 2 DM, no polyuria and no polydipsia. Not monitoring FS at home. Taking Metformin ER 1,000 mg daily. Diet is better now. Enjoys wine especially while in [place].  UTD with ophthalmology (12/14/2020) and no retinopathy. Had foot pain and using inserts. Does have podiatrist. Taking ACE-I for proteinuria. For breakfast, no more bagels and has rye with egg whites and coffee with no sugar. For lunch: pizza rarely or hamburger but sometimes skips. For dinner: Less pasta and rice. No sodas or juices. Does drink wine especially when in [place]. Likes fruits at night with occasional sweets. Still smoking. Does lots of walking. \par \par In regards to thyroid disease, taking Synthroid 150 mcg daily since 12/2020. No neck complaints. Had unremarkable neck ultrasound on 12/4/19. Had total thyroidectomy and  I131 in 1999. No SOB or palpitations. \par \par In regards to vitamin D def, was taking vitamin D3 2,000 Iu daily but getting more sun now so stopped for the summer.

## 2021-08-04 LAB
25(OH)D3 SERPL-MCNC: 23.1 NG/ML
ALBUMIN SERPL ELPH-MCNC: 4.6 G/DL
ALP BLD-CCNC: 74 U/L
ALT SERPL-CCNC: 15 U/L
ANION GAP SERPL CALC-SCNC: 16 MMOL/L
AST SERPL-CCNC: 16 U/L
BASOPHILS # BLD AUTO: 0.06 K/UL
BASOPHILS NFR BLD AUTO: 0.7 %
BILIRUB SERPL-MCNC: 0.2 MG/DL
BUN SERPL-MCNC: 19 MG/DL
CALCIUM SERPL-MCNC: 9.7 MG/DL
CHLORIDE SERPL-SCNC: 103 MMOL/L
CHOLEST SERPL-MCNC: 186 MG/DL
CO2 SERPL-SCNC: 23 MMOL/L
CREAT SERPL-MCNC: 0.89 MG/DL
EOSINOPHIL # BLD AUTO: 0.81 K/UL
EOSINOPHIL NFR BLD AUTO: 10 %
FOLATE SERPL-MCNC: 10.3 NG/ML
GLUCOSE SERPL-MCNC: 96 MG/DL
HCT VFR BLD CALC: 47.7 %
HDLC SERPL-MCNC: 43 MG/DL
HGB BLD-MCNC: 14.7 G/DL
IMM GRANULOCYTES NFR BLD AUTO: 0.1 %
LDLC SERPL CALC-MCNC: 111 MG/DL
LYMPHOCYTES # BLD AUTO: 2.21 K/UL
LYMPHOCYTES NFR BLD AUTO: 27.4 %
MAN DIFF?: NORMAL
MCHC RBC-ENTMCNC: 28.7 PG
MCHC RBC-ENTMCNC: 30.8 GM/DL
MCV RBC AUTO: 93.2 FL
MONOCYTES # BLD AUTO: 0.65 K/UL
MONOCYTES NFR BLD AUTO: 8.1 %
NEUTROPHILS # BLD AUTO: 4.33 K/UL
NEUTROPHILS NFR BLD AUTO: 53.7 %
NONHDLC SERPL-MCNC: 143 MG/DL
PLATELET # BLD AUTO: 300 K/UL
POTASSIUM SERPL-SCNC: 5 MMOL/L
PROT SERPL-MCNC: 7.3 G/DL
RBC # BLD: 5.12 M/UL
RBC # FLD: 13.4 %
SODIUM SERPL-SCNC: 142 MMOL/L
T4 FREE SERPL-MCNC: 1.2 NG/DL
THYROGLOB AB SERPL-ACNC: <20 IU/ML
THYROGLOB SERPL-MCNC: <0.2 NG/ML
TRIGL SERPL-MCNC: 162 MG/DL
TSH SERPL-ACNC: 3.45 UIU/ML
VIT B12 SERPL-MCNC: 387 PG/ML
WBC # FLD AUTO: 8.07 K/UL

## 2021-08-06 LAB
CREAT SPEC-SCNC: 81 MG/DL
MICROALBUMIN 24H UR DL<=1MG/L-MCNC: 2.2 MG/DL
MICROALBUMIN/CREAT 24H UR-RTO: 27 MG/G

## 2021-10-22 ENCOUNTER — APPOINTMENT (OUTPATIENT)
Dept: CT IMAGING | Facility: IMAGING CENTER | Age: 66
End: 2021-10-22
Payer: MEDICARE

## 2021-10-22 ENCOUNTER — OUTPATIENT (OUTPATIENT)
Dept: OUTPATIENT SERVICES | Facility: HOSPITAL | Age: 66
LOS: 1 days | End: 2021-10-22
Payer: MEDICARE

## 2021-10-22 ENCOUNTER — RESULT REVIEW (OUTPATIENT)
Age: 66
End: 2021-10-22

## 2021-10-22 DIAGNOSIS — R11.2 NAUSEA WITH VOMITING, UNSPECIFIED: ICD-10-CM

## 2021-10-22 DIAGNOSIS — R10.11 RIGHT UPPER QUADRANT PAIN: ICD-10-CM

## 2021-10-22 PROCEDURE — 74177 CT ABD & PELVIS W/CONTRAST: CPT | Mod: MH

## 2021-10-22 PROCEDURE — 74177 CT ABD & PELVIS W/CONTRAST: CPT | Mod: 26,MH

## 2021-10-22 PROCEDURE — 82565 ASSAY OF CREATININE: CPT

## 2021-10-25 ENCOUNTER — RX RENEWAL (OUTPATIENT)
Age: 66
End: 2021-10-25

## 2021-10-26 ENCOUNTER — TRANSCRIPTION ENCOUNTER (OUTPATIENT)
Age: 66
End: 2021-10-26

## 2021-10-26 ENCOUNTER — APPOINTMENT (OUTPATIENT)
Dept: SURGICAL ONCOLOGY | Facility: CLINIC | Age: 66
End: 2021-10-26
Payer: MEDICARE

## 2021-10-26 ENCOUNTER — NON-APPOINTMENT (OUTPATIENT)
Age: 66
End: 2021-10-26

## 2021-10-26 VITALS
HEART RATE: 96 BPM | OXYGEN SATURATION: 95 % | DIASTOLIC BLOOD PRESSURE: 78 MMHG | WEIGHT: 210 LBS | HEIGHT: 69 IN | RESPIRATION RATE: 16 BRPM | SYSTOLIC BLOOD PRESSURE: 127 MMHG | BODY MASS INDEX: 31.1 KG/M2

## 2021-10-26 PROCEDURE — 99205 OFFICE O/P NEW HI 60 MIN: CPT

## 2021-10-26 NOTE — CONSULT LETTER
[Dear  ___] : Dear  [unfilled], [Courtesy Letter:] : I had the pleasure of seeing your patient, [unfilled], in my office today. [Please see my note below.] : Please see my note below. [Consult Closing:] : Thank you very much for allowing me to participate in the care of this patient.  If you have any questions, please do not hesitate to contact me. [Sincerely,] : Sincerely, [FreeTextEntry3] : Bashir Maloney MD, MPH, FACS, FSSO\par , [facility]\par [facility]\par Associate Professor of Surgery\par [employer] [facility]

## 2021-10-26 NOTE — HISTORY OF PRESENT ILLNESS
[de-identified] : 66 year male presents for an initial consultation, referred by Dr. Mk Campbell who is his wife's physician.  In May 2021 he began to experience epigastric discomfort exacerbated by eating acidic foods.  He felt this was related to coffee.  He was ultimately referred for an EGD with Dr. Fred Bee  on 10/19/21 to evaluate reflux symptoms (EGD delayed due to travel to [facility]).  Study was notable for ulceration in the gastric antrum/pre pyloric region.  The following biopsies were taken with the following pathology:\par \par 1) Stomach, pre pyloric mass #1: poorly differentiated carcinoma (favoring adenocarcinoma)\par 2) Stomach, pre pyloric mass #2: poorly differentiated carcinoma (favoring adenocarcinoma)\par 3) Stomach, antrum: no carcinoma, + H. pylori\par 4) Stomach, body: no carcinoma, + H. pylori\par \par CT of the abdomen and pelvis performed on 10/22/21 revealed a distal gastric mass with appearance of transmural extension along with adjacent perigastric lymphadenopathy and upper retroperitoneal lymphadenopathy.  Differentials include lymphoma and primary gastric carcinoma with luli metastatic disease.\par \par His past medical history includes thyroid cancer, type 2 diabetes.  Past surgical history includes an inguinal hernia repair in childhood.  Family history of liver cancer involving his father at 73.\par \par He was not prescribed triple therapy for H. pylori.  He states that his epigastric pain has improved on PPI and carafate as well as diet modifications.  He has no abdominal pain, nausea, vomiting, unintentional weight loss or early satiety. \par \par PCP: Dr. Remy Suarez

## 2021-10-26 NOTE — ASSESSMENT
[FreeTextEntry1] : 66 year-old male with newly diagnosed locally advanced gastric cancer, concern for perigastric and para-aortic LN involvement on CT scan\par \par PLAN:\par 1) Dedicated Chest CT and PET/CT for staging\par 2) Triple therapy for H. pylori (will ultimately need to follow up with GI to confirm eradication)\par 3) Diagnostic laparoscopy, given the bulky nodes and the transmural involvement of stomach wall seen on CT scan - the rate of occult metastasis is higher in this population. We discussed the risks, benefits, and alternatives of the procedure with the patient, he expressed understanding and agree to proceed with diagnostic laparoscopy.\par 4) Medical oncology evaluation for neoadjuvant therapy

## 2021-10-27 ENCOUNTER — NON-APPOINTMENT (OUTPATIENT)
Age: 66
End: 2021-10-27

## 2021-10-27 ENCOUNTER — APPOINTMENT (OUTPATIENT)
Dept: CT IMAGING | Facility: IMAGING CENTER | Age: 66
End: 2021-10-27
Payer: MEDICARE

## 2021-10-27 ENCOUNTER — OUTPATIENT (OUTPATIENT)
Dept: OUTPATIENT SERVICES | Facility: HOSPITAL | Age: 66
LOS: 1 days | End: 2021-10-27
Payer: MEDICARE

## 2021-10-27 DIAGNOSIS — C16.9 MALIGNANT NEOPLASM OF STOMACH, UNSPECIFIED: ICD-10-CM

## 2021-10-27 PROCEDURE — 71250 CT THORAX DX C-: CPT | Mod: MG

## 2021-10-27 PROCEDURE — G1004: CPT

## 2021-10-27 PROCEDURE — 71250 CT THORAX DX C-: CPT | Mod: 26,MG

## 2021-10-28 ENCOUNTER — OUTPATIENT (OUTPATIENT)
Dept: OUTPATIENT SERVICES | Facility: HOSPITAL | Age: 66
LOS: 1 days | Discharge: HOME | End: 2021-10-28

## 2021-10-28 ENCOUNTER — OUTPATIENT (OUTPATIENT)
Dept: OUTPATIENT SERVICES | Facility: HOSPITAL | Age: 66
LOS: 1 days | End: 2021-10-28
Payer: MEDICARE

## 2021-10-28 VITALS
HEART RATE: 75 BPM | TEMPERATURE: 98 F | DIASTOLIC BLOOD PRESSURE: 70 MMHG | WEIGHT: 210.1 LBS | SYSTOLIC BLOOD PRESSURE: 122 MMHG | RESPIRATION RATE: 16 BRPM | OXYGEN SATURATION: 96 % | HEIGHT: 67 IN

## 2021-10-28 DIAGNOSIS — E89.0 POSTPROCEDURAL HYPOTHYROIDISM: Chronic | ICD-10-CM

## 2021-10-28 DIAGNOSIS — C16.9 MALIGNANT NEOPLASM OF STOMACH, UNSPECIFIED: ICD-10-CM

## 2021-10-28 DIAGNOSIS — K21.9 GASTRO-ESOPHAGEAL REFLUX DISEASE WITHOUT ESOPHAGITIS: ICD-10-CM

## 2021-10-28 DIAGNOSIS — I10 ESSENTIAL (PRIMARY) HYPERTENSION: ICD-10-CM

## 2021-10-28 DIAGNOSIS — E03.9 HYPOTHYROIDISM, UNSPECIFIED: ICD-10-CM

## 2021-10-28 DIAGNOSIS — Z98.890 OTHER SPECIFIED POSTPROCEDURAL STATES: Chronic | ICD-10-CM

## 2021-10-28 DIAGNOSIS — Z86.19 PERSONAL HISTORY OF OTHER INFECTIOUS AND PARASITIC DISEASES: ICD-10-CM

## 2021-10-28 DIAGNOSIS — E11.9 TYPE 2 DIABETES MELLITUS WITHOUT COMPLICATIONS: ICD-10-CM

## 2021-10-28 DIAGNOSIS — G47.33 OBSTRUCTIVE SLEEP APNEA (ADULT) (PEDIATRIC): ICD-10-CM

## 2021-10-28 LAB
A1C WITH ESTIMATED AVERAGE GLUCOSE RESULT: 6.9 % — HIGH (ref 4–5.6)
ALBUMIN SERPL ELPH-MCNC: 4.2 G/DL — SIGNIFICANT CHANGE UP (ref 3.3–5)
ALP SERPL-CCNC: 87 U/L — SIGNIFICANT CHANGE UP (ref 40–120)
ALT FLD-CCNC: 16 U/L — SIGNIFICANT CHANGE UP (ref 4–41)
ANION GAP SERPL CALC-SCNC: 14 MMOL/L — SIGNIFICANT CHANGE UP (ref 7–14)
AST SERPL-CCNC: 13 U/L — SIGNIFICANT CHANGE UP (ref 4–40)
BILIRUB SERPL-MCNC: 0.2 MG/DL — SIGNIFICANT CHANGE UP (ref 0.2–1.2)
BLD GP AB SCN SERPL QL: NEGATIVE — SIGNIFICANT CHANGE UP
BUN SERPL-MCNC: 19 MG/DL — SIGNIFICANT CHANGE UP (ref 7–23)
CALCIUM SERPL-MCNC: 9.6 MG/DL — SIGNIFICANT CHANGE UP (ref 8.4–10.5)
CHLORIDE SERPL-SCNC: 100 MMOL/L — SIGNIFICANT CHANGE UP (ref 98–107)
CO2 SERPL-SCNC: 24 MMOL/L — SIGNIFICANT CHANGE UP (ref 22–31)
CREAT SERPL-MCNC: 0.97 MG/DL — SIGNIFICANT CHANGE UP (ref 0.5–1.3)
ESTIMATED AVERAGE GLUCOSE: 151 — SIGNIFICANT CHANGE UP
GLUCOSE SERPL-MCNC: 171 MG/DL — HIGH (ref 70–99)
HCT VFR BLD CALC: 42.1 % — SIGNIFICANT CHANGE UP (ref 39–50)
HGB BLD-MCNC: 13.9 G/DL — SIGNIFICANT CHANGE UP (ref 13–17)
MCHC RBC-ENTMCNC: 28.3 PG — SIGNIFICANT CHANGE UP (ref 27–34)
MCHC RBC-ENTMCNC: 33 GM/DL — SIGNIFICANT CHANGE UP (ref 32–36)
MCV RBC AUTO: 85.7 FL — SIGNIFICANT CHANGE UP (ref 80–100)
NRBC # BLD: 0 /100 WBCS — SIGNIFICANT CHANGE UP
NRBC # FLD: 0 K/UL — SIGNIFICANT CHANGE UP
PLATELET # BLD AUTO: 351 K/UL — SIGNIFICANT CHANGE UP (ref 150–400)
POTASSIUM SERPL-MCNC: 4 MMOL/L — SIGNIFICANT CHANGE UP (ref 3.5–5.3)
POTASSIUM SERPL-SCNC: 4 MMOL/L — SIGNIFICANT CHANGE UP (ref 3.5–5.3)
PROT SERPL-MCNC: 7.4 G/DL — SIGNIFICANT CHANGE UP (ref 6–8.3)
RBC # BLD: 4.91 M/UL — SIGNIFICANT CHANGE UP (ref 4.2–5.8)
RBC # FLD: 13 % — SIGNIFICANT CHANGE UP (ref 10.3–14.5)
RH IG SCN BLD-IMP: POSITIVE — SIGNIFICANT CHANGE UP
SODIUM SERPL-SCNC: 138 MMOL/L — SIGNIFICANT CHANGE UP (ref 135–145)
WBC # BLD: 9.83 K/UL — SIGNIFICANT CHANGE UP (ref 3.8–10.5)
WBC # FLD AUTO: 9.83 K/UL — SIGNIFICANT CHANGE UP (ref 3.8–10.5)

## 2021-10-28 PROCEDURE — 93010 ELECTROCARDIOGRAM REPORT: CPT

## 2021-10-28 RX ORDER — SODIUM CHLORIDE 9 MG/ML
1000 INJECTION, SOLUTION INTRAVENOUS
Refills: 0 | Status: DISCONTINUED | OUTPATIENT
Start: 2021-11-03 | End: 2021-11-17

## 2021-10-28 NOTE — H&P PST ADULT - NSICDXPASTMEDICALHX_GEN_ALL_CORE_FT
PAST MEDICAL HISTORY:  Cancer of thyroid tx surgically    Diabetes mellitus x15 years ; pt denies fs    GERD (gastroesophageal reflux disease)     History of BPH     Hypertension     Hypothyroidism      PAST MEDICAL HISTORY:  Cancer of thyroid tx surgically    Diabetes mellitus x15 years ; pt denies fs    GERD (gastroesophageal reflux disease)     History of BPH     Hypertension      PAST MEDICAL HISTORY:  Cancer of thyroid tx surgically    Diabetes mellitus x15 years ; pt denies fs    GERD (gastroesophageal reflux disease)     Helicobacter pylori infection Rx ; to be completed 10/05/21    History of BPH     Hypertension

## 2021-10-28 NOTE — H&P PST ADULT - PROBLEM SELECTOR PLAN 3
BMP, HGBA1C done 10/28/21  Pt to hold Metformin evening prior to surgery  FS dos    Last consult Dr Sher

## 2021-10-28 NOTE — H&P PST ADULT - PROBLEM SELECTOR PLAN 1
Diagnostic laparoscopy < Placement of Mediport     Pre op instructions reviewed with pt including Hibiclens with teach back, pt verbalized good understanding of pre op instructions    Pt reviewed with Dr Vale; Dr Crespo to provide pre op m/c EKG comparison   Call to surgeons office ; s/w Nicole to make surgeon aware Diagnostic laparoscopy  Placement of Mediport     Pre op instructions reviewed with pt including Hibiclens with teach back, pt verbalized good understanding of pre op instructions    Pt reviewed with Dr Vale; Dr Crespo to provide pre op m/c EKG comparison   Call to surgeons office ; s/w Nicole to make surgeon aware

## 2021-10-28 NOTE — H&P PST ADULT - ATTENDING COMMENTS
Risks, benefits, and alternatives discussed with the patient - he expressed understanding and agree to proceed with diagnostic laparoscopy, placement of mediport.

## 2021-10-28 NOTE — H&P PST ADULT - HISTORY OF PRESENT ILLNESS
Pt is a 66 y.o. male ; pt reports h/o abdominal pain first noted 6/21 ; pt pt s/p c/t scan , s/p Endoscopy & biopsy ; pt to surgeon ; pt now presents for Diagnostic laparoscopy, Placement of Mediport Peritoneal Cytology   Pt is a 66 y.o. male ; pt reports h/o abdominal pain first noted 6/21 ; pt pt s/p c/t scan , s/p Endoscopy & biopsy ; pt to surgeon ; pt now presents for Diagnostic laparoscopy, Placement of Mediport

## 2021-10-29 ENCOUNTER — RESULT REVIEW (OUTPATIENT)
Age: 66
End: 2021-10-29

## 2021-10-29 ENCOUNTER — NON-APPOINTMENT (OUTPATIENT)
Age: 66
End: 2021-10-29

## 2021-10-29 ENCOUNTER — APPOINTMENT (OUTPATIENT)
Dept: SURGICAL ONCOLOGY | Facility: CLINIC | Age: 66
End: 2021-10-29

## 2021-10-29 ENCOUNTER — APPOINTMENT (OUTPATIENT)
Dept: HEMATOLOGY ONCOLOGY | Facility: CLINIC | Age: 66
End: 2021-10-29
Payer: MEDICARE

## 2021-10-29 VITALS
TEMPERATURE: 98 F | OXYGEN SATURATION: 96 % | SYSTOLIC BLOOD PRESSURE: 146 MMHG | WEIGHT: 209 LBS | DIASTOLIC BLOOD PRESSURE: 78 MMHG | HEART RATE: 68 BPM | RESPIRATION RATE: 16 BRPM | HEIGHT: 68.98 IN | BODY MASS INDEX: 30.96 KG/M2

## 2021-10-29 PROBLEM — A04.8 OTHER SPECIFIED BACTERIAL INTESTINAL INFECTIONS: Chronic | Status: ACTIVE | Noted: 2021-10-28

## 2021-10-29 PROBLEM — K21.9 GASTRO-ESOPHAGEAL REFLUX DISEASE WITHOUT ESOPHAGITIS: Chronic | Status: ACTIVE | Noted: 2021-10-28

## 2021-10-29 PROBLEM — E11.9 TYPE 2 DIABETES MELLITUS WITHOUT COMPLICATIONS: Chronic | Status: ACTIVE | Noted: 2021-10-28

## 2021-10-29 PROBLEM — Z87.438 PERSONAL HISTORY OF OTHER DISEASES OF MALE GENITAL ORGANS: Chronic | Status: ACTIVE | Noted: 2021-10-28

## 2021-10-29 PROBLEM — I10 ESSENTIAL (PRIMARY) HYPERTENSION: Chronic | Status: ACTIVE | Noted: 2021-10-28

## 2021-10-29 PROBLEM — C73 MALIGNANT NEOPLASM OF THYROID GLAND: Chronic | Status: ACTIVE | Noted: 2021-10-28

## 2021-10-29 LAB
BASOPHILS # BLD AUTO: 0.08 K/UL — SIGNIFICANT CHANGE UP (ref 0–0.2)
BASOPHILS NFR BLD AUTO: 0.9 % — SIGNIFICANT CHANGE UP (ref 0–2)
EOSINOPHIL # BLD AUTO: 1 K/UL — HIGH (ref 0–0.5)
EOSINOPHIL NFR BLD AUTO: 11.6 % — HIGH (ref 0–6)
HCT VFR BLD CALC: 41.5 % — SIGNIFICANT CHANGE UP (ref 39–50)
HGB BLD-MCNC: 13.7 G/DL — SIGNIFICANT CHANGE UP (ref 13–17)
IMM GRANULOCYTES NFR BLD AUTO: 0.3 % — SIGNIFICANT CHANGE UP (ref 0–1.5)
LYMPHOCYTES # BLD AUTO: 2.15 K/UL — SIGNIFICANT CHANGE UP (ref 1–3.3)
LYMPHOCYTES # BLD AUTO: 24.9 % — SIGNIFICANT CHANGE UP (ref 13–44)
MCHC RBC-ENTMCNC: 28.4 PG — SIGNIFICANT CHANGE UP (ref 27–34)
MCHC RBC-ENTMCNC: 33 G/DL — SIGNIFICANT CHANGE UP (ref 32–36)
MCV RBC AUTO: 86.1 FL — SIGNIFICANT CHANGE UP (ref 80–100)
MONOCYTES # BLD AUTO: 0.84 K/UL — SIGNIFICANT CHANGE UP (ref 0–0.9)
MONOCYTES NFR BLD AUTO: 9.7 % — SIGNIFICANT CHANGE UP (ref 2–14)
NEUTROPHILS # BLD AUTO: 4.52 K/UL — SIGNIFICANT CHANGE UP (ref 1.8–7.4)
NEUTROPHILS NFR BLD AUTO: 52.6 % — SIGNIFICANT CHANGE UP (ref 43–77)
NRBC # BLD: 0 /100 WBCS — SIGNIFICANT CHANGE UP (ref 0–0)
PLATELET # BLD AUTO: 328 K/UL — SIGNIFICANT CHANGE UP (ref 150–400)
RBC # BLD: 4.82 M/UL — SIGNIFICANT CHANGE UP (ref 4.2–5.8)
RBC # FLD: 13.1 % — SIGNIFICANT CHANGE UP (ref 10.3–14.5)
WBC # BLD: 8.62 K/UL — SIGNIFICANT CHANGE UP (ref 3.8–10.5)
WBC # FLD AUTO: 8.62 K/UL — SIGNIFICANT CHANGE UP (ref 3.8–10.5)

## 2021-10-29 PROCEDURE — 99205 OFFICE O/P NEW HI 60 MIN: CPT

## 2021-10-31 LAB
ALBUMIN SERPL ELPH-MCNC: 4.4 G/DL
ALP BLD-CCNC: 68 U/L
ALT SERPL-CCNC: 15 U/L
ANION GAP SERPL CALC-SCNC: 14 MMOL/L
AST SERPL-CCNC: 17 U/L
BILIRUB SERPL-MCNC: 0.3 MG/DL
BUN SERPL-MCNC: 22 MG/DL
CALCIUM SERPL-MCNC: 9.3 MG/DL
CEA SERPL-MCNC: 6.7 NG/ML
CHLORIDE SERPL-SCNC: 105 MMOL/L
CO2 SERPL-SCNC: 24 MMOL/L
CREAT SERPL-MCNC: 1.02 MG/DL
GLUCOSE SERPL-MCNC: 83 MG/DL
HBV CORE IGG+IGM SER QL: NONREACTIVE
HBV SURFACE AB SER QL: NONREACTIVE
HBV SURFACE AG SER QL: NONREACTIVE
HCV AB SER QL: NONREACTIVE
HCV S/CO RATIO: 0.22 S/CO
POTASSIUM SERPL-SCNC: 4.4 MMOL/L
PROT SERPL-MCNC: 7 G/DL
SODIUM SERPL-SCNC: 142 MMOL/L

## 2021-11-01 ENCOUNTER — APPOINTMENT (OUTPATIENT)
Dept: NUCLEAR MEDICINE | Facility: IMAGING CENTER | Age: 66
End: 2021-11-01
Payer: MEDICARE

## 2021-11-01 ENCOUNTER — OUTPATIENT (OUTPATIENT)
Dept: OUTPATIENT SERVICES | Facility: HOSPITAL | Age: 66
LOS: 1 days | End: 2021-11-01
Payer: MEDICARE

## 2021-11-01 ENCOUNTER — APPOINTMENT (OUTPATIENT)
Dept: DISASTER EMERGENCY | Facility: CLINIC | Age: 66
End: 2021-11-01

## 2021-11-01 DIAGNOSIS — Z98.890 OTHER SPECIFIED POSTPROCEDURAL STATES: Chronic | ICD-10-CM

## 2021-11-01 DIAGNOSIS — E89.0 POSTPROCEDURAL HYPOTHYROIDISM: Chronic | ICD-10-CM

## 2021-11-01 DIAGNOSIS — C16.9 MALIGNANT NEOPLASM OF STOMACH, UNSPECIFIED: ICD-10-CM

## 2021-11-01 PROCEDURE — 78815 PET IMAGE W/CT SKULL-THIGH: CPT | Mod: 26,PI,MH

## 2021-11-01 PROCEDURE — 78815 PET IMAGE W/CT SKULL-THIGH: CPT

## 2021-11-01 PROCEDURE — A9552: CPT

## 2021-11-02 ENCOUNTER — TRANSCRIPTION ENCOUNTER (OUTPATIENT)
Age: 66
End: 2021-11-02

## 2021-11-02 LAB — SARS-COV-2 N GENE NPH QL NAA+PROBE: NOT DETECTED

## 2021-11-02 NOTE — ASU PATIENT PROFILE, ADULT - NSICDXPASTMEDICALHX_GEN_ALL_CORE_FT
PAST MEDICAL HISTORY:  Cancer of thyroid tx surgically    Diabetes mellitus x15 years ; pt denies fs    GERD (gastroesophageal reflux disease)     Helicobacter pylori infection Rx ; to be completed 10/05/21    History of BPH     Hypertension

## 2021-11-03 ENCOUNTER — RESULT REVIEW (OUTPATIENT)
Age: 66
End: 2021-11-03

## 2021-11-03 ENCOUNTER — APPOINTMENT (OUTPATIENT)
Dept: SURGICAL ONCOLOGY | Facility: HOSPITAL | Age: 66
End: 2021-11-03

## 2021-11-03 ENCOUNTER — OUTPATIENT (OUTPATIENT)
Dept: OUTPATIENT SERVICES | Facility: HOSPITAL | Age: 66
LOS: 1 days | Discharge: HOME | End: 2021-11-03
Payer: MEDICARE

## 2021-11-03 VITALS
HEIGHT: 67 IN | TEMPERATURE: 99 F | HEART RATE: 67 BPM | WEIGHT: 210.1 LBS | RESPIRATION RATE: 16 BRPM | DIASTOLIC BLOOD PRESSURE: 70 MMHG | SYSTOLIC BLOOD PRESSURE: 121 MMHG | OXYGEN SATURATION: 95 %

## 2021-11-03 VITALS
DIASTOLIC BLOOD PRESSURE: 77 MMHG | SYSTOLIC BLOOD PRESSURE: 126 MMHG | OXYGEN SATURATION: 96 % | HEART RATE: 72 BPM | RESPIRATION RATE: 16 BRPM

## 2021-11-03 DIAGNOSIS — E89.0 POSTPROCEDURAL HYPOTHYROIDISM: Chronic | ICD-10-CM

## 2021-11-03 DIAGNOSIS — Z98.890 OTHER SPECIFIED POSTPROCEDURAL STATES: Chronic | ICD-10-CM

## 2021-11-03 DIAGNOSIS — C16.9 MALIGNANT NEOPLASM OF STOMACH, UNSPECIFIED: ICD-10-CM

## 2021-11-03 LAB — GLUCOSE BLDC GLUCOMTR-MCNC: 111 MG/DL — HIGH (ref 70–99)

## 2021-11-03 PROCEDURE — 49320 DIAG LAPARO SEPARATE PROC: CPT | Mod: 59

## 2021-11-03 PROCEDURE — 88342 IMHCHEM/IMCYTCHM 1ST ANTB: CPT | Mod: 26,59

## 2021-11-03 PROCEDURE — 88112 CYTOPATH CELL ENHANCE TECH: CPT | Mod: 26

## 2021-11-03 PROCEDURE — 88305 TISSUE EXAM BY PATHOLOGIST: CPT | Mod: 26,59

## 2021-11-03 PROCEDURE — 36561 INSERT TUNNELED CV CATH: CPT

## 2021-11-03 PROCEDURE — 49203: CPT

## 2021-11-03 PROCEDURE — 88341 IMHCHEM/IMCYTCHM EA ADD ANTB: CPT | Mod: 26,59

## 2021-11-03 PROCEDURE — 88305 TISSUE EXAM BY PATHOLOGIST: CPT | Mod: 26

## 2021-11-03 PROCEDURE — 88360 TUMOR IMMUNOHISTOCHEM/MANUAL: CPT | Mod: 26

## 2021-11-03 PROCEDURE — 71045 X-RAY EXAM CHEST 1 VIEW: CPT | Mod: 26

## 2021-11-03 PROCEDURE — 88304 TISSUE EXAM BY PATHOLOGIST: CPT | Mod: 26

## 2021-11-03 RX ORDER — OXYCODONE HYDROCHLORIDE 5 MG/1
5 TABLET ORAL ONCE
Refills: 0 | Status: DISCONTINUED | OUTPATIENT
Start: 2021-11-03 | End: 2021-11-03

## 2021-11-03 RX ORDER — ONDANSETRON 8 MG/1
4 TABLET, FILM COATED ORAL ONCE
Refills: 0 | Status: DISCONTINUED | OUTPATIENT
Start: 2021-11-03 | End: 2021-11-17

## 2021-11-03 RX ORDER — FENTANYL CITRATE 50 UG/ML
50 INJECTION INTRAVENOUS
Refills: 0 | Status: DISCONTINUED | OUTPATIENT
Start: 2021-11-03 | End: 2021-11-03

## 2021-11-03 NOTE — ASU DISCHARGE PLAN (ADULT/PEDIATRIC) - CARE PROVIDER_API CALL
Bashir Maloney)  [facility] General Surgical Oncology; Surgery; Surgical Oncology  [address] [place] [address]  Phone: [phone]  Fax: [phone]  Follow Up Time:

## 2021-11-03 NOTE — HISTORY OF PRESENT ILLNESS
[Disease: _____________________] : Disease: [unfilled] [de-identified] : Patient is a 65 y/o M with known PMHx of hypothyroidism secondary to thyroidectomy from previous thyroid CA and NIDDM who first presented Dr. Cox c/o of epigastric pain made worse with eating since 5/2021 that patient thought was due to drinking too much coffee.  An EGD was performed on 10/19/21 that showed a large ulcerated mass in the gastric antrum/lesser curvature not involving the pylorus.  Biopsies were taken on the mass and were positive for poorly differentiated carcinoma favoring adenocarcinoma and H. pylori.  The patient was then sent for a CT A/P with contrast that showed a distal gastric mass with appearance of transmural extension along with adjacent perigastric lymphadenopathy and upper retroperitoneal lymphadenopathy.  The patient met with Dr. Maloney on 10/26/21 who ordered a CT Chest and a PET scan.  CT Chest on 10/27/21 showed no intrathoracic metastatic disease.  PET scan is scheduled for 11/2/21.  Plan is for ex-lap with Dr. Maloney with Port placement on 11/3/21.  The patient is here today to establish oncologic care.  The patient states that since starting triple therapy for H. pylori, his abdominal pain has improved.  Denies weight loss, anorexia, N/V, diarrhea, constipation, rectal bleeding or melena.\par \par \par FHx: father - liver cancer (diagnosed late stage in his 70's); paternal aunt - gastric cancer (diagnosed in her 50's, treated with resection)\par SHx: + tobacco 45 pack year history; + ETOH - wine with dinner [de-identified] : poorly differentiated carcinoma [de-identified] : CEA [de-identified] : Immunostains are positive for CK7, negative for CK20, CDX2, CD56, chromogranin and synaptophysin with marked increase of Ki-67 proliferation rate (>90%)

## 2021-11-03 NOTE — BRIEF OPERATIVE NOTE - OPERATION/FINDINGS
Diagnostic laparoscopy, resection of perigastric lymph node, right interna jugular tunneled mediport placement

## 2021-11-03 NOTE — REASON FOR VISIT
[Initial Consultation] : an initial consultation [Spouse] : spouse [FreeTextEntry2] : gastric cancer

## 2021-11-03 NOTE — BRIEF OPERATIVE NOTE - NSICDXBRIEFPROCEDURE_GEN_ALL_CORE_FT
PROCEDURES:  Diagnostic laparoscopy 03-Nov-2021 10:55:01  Ethan Choudhary  Biopsy of abdominal lymph node 03-Nov-2021 10:55:36  Ethan Choudhary  Insertion, catheter, central venous, tunneled, with port, with C-arm fluoroscopic guidance 03-Nov-2021 10:56:50  Ethan Choudhary

## 2021-11-03 NOTE — ASU DISCHARGE PLAN (ADULT/PEDIATRIC) - NURSING INSTRUCTIONS
You were given 1000mg IV Tylenol for pain management.  Please DO NOT take any Tylenol containing products, such as  Vicodin, Percocet, Excedrin, many cold preparations for the next 6 hours (until 330p).  DO NOT EXCEED 3000MG OF TYLENOL OVER 24 HOURS. You were given 1000mg IV Tylenol for pain management.  Please DO NOT take any Tylenol containing products, such as  Vicodin, Percocet, Excedrin, many cold preparations for the next 6 hours (until 330p).  DO NOT EXCEED 3000MG OF TYLENOL OVER 24 HOURS.   No heavy lifting or straining. Keep dressing D & I. You may remove outside dressing tomorrow. If any pain not being relieved with medication , fever over 101 or bleeding that does not stop notify MD.

## 2021-11-03 NOTE — CONSULT LETTER
[Dear  ___] : Dear  [unfilled], [Consult Letter:] : I had the pleasure of evaluating your patient, [unfilled]. [Please see my note below.] : Please see my note below. [Consult Closing:] : Thank you very much for allowing me to participate in the care of this patient.  If you have any questions, please do not hesitate to contact me. [Sincerely,] : Sincerely, [FreeTextEntry3] : Albert Mercedes MD, FACP \par  of Medicine \par Division of Hematology/Oncology\par [facility]\par [facility] \par [address]\[place] [address]\par Tel: [phone]\par Fax: [phone]\par \par \par

## 2021-11-03 NOTE — ASU DISCHARGE PLAN (ADULT/PEDIATRIC) - ASU DC SPECIAL INSTRUCTIONSFT
WOUND CARE: Do not remove steri strips. Exterior dressing can be removed in 24 hours.  BATHING: Please do not submerge wound underwater. You may shower and/or sponge bathe.  ACTIVITY: No heavy lifting or straining. Otherwise, you may return to your usual level of physical activity. If you are taking narcotic pain medication (such as Percocet), do NOT drive a car, operate machinery or make important decisions.  DIET: Return to your usual diet.  NOTIFY YOUR SURGEON IF: You have any bleeding that does not stop, any pus draining from your wound, any fever (over 100.4 F) or chills, persistent nausea/vomiting, persistent diarrhea, or if your pain is not controlled on your discharge pain medications.  FOLLOW-UP:  1. Follow-up with Dr. Maloney within 1-2 weeks of discharge.  Please call office for appointment  2. Please follow up with your primary care physician in one week regarding your hospitalization.

## 2021-11-04 LAB
NON-GYNECOLOGICAL CYTOLOGY STUDY: SIGNIFICANT CHANGE UP
NON-GYNECOLOGICAL CYTOLOGY STUDY: SIGNIFICANT CHANGE UP

## 2021-11-05 ENCOUNTER — OUTPATIENT (OUTPATIENT)
Dept: OUTPATIENT SERVICES | Facility: HOSPITAL | Age: 66
LOS: 1 days | End: 2021-11-05
Payer: MEDICARE

## 2021-11-05 ENCOUNTER — RESULT REVIEW (OUTPATIENT)
Age: 66
End: 2021-11-05

## 2021-11-05 DIAGNOSIS — Z98.890 OTHER SPECIFIED POSTPROCEDURAL STATES: Chronic | ICD-10-CM

## 2021-11-05 DIAGNOSIS — E89.0 POSTPROCEDURAL HYPOTHYROIDISM: Chronic | ICD-10-CM

## 2021-11-05 DIAGNOSIS — C16.9 MALIGNANT NEOPLASM OF STOMACH, UNSPECIFIED: ICD-10-CM

## 2021-11-05 PROCEDURE — 88321 CONSLTJ&REPRT SLD PREP ELSWR: CPT

## 2021-11-08 LAB — SURGICAL PATHOLOGY STUDY: SIGNIFICANT CHANGE UP

## 2021-11-11 ENCOUNTER — LABORATORY RESULT (OUTPATIENT)
Age: 66
End: 2021-11-11

## 2021-11-11 ENCOUNTER — RESULT REVIEW (OUTPATIENT)
Age: 66
End: 2021-11-11

## 2021-11-11 ENCOUNTER — APPOINTMENT (OUTPATIENT)
Dept: INFUSION THERAPY | Facility: HOSPITAL | Age: 66
End: 2021-11-11

## 2021-11-11 LAB
BASOPHILS # BLD AUTO: 0.08 K/UL — SIGNIFICANT CHANGE UP (ref 0–0.2)
BASOPHILS NFR BLD AUTO: 0.9 % — SIGNIFICANT CHANGE UP (ref 0–2)
EOSINOPHIL # BLD AUTO: 0.9 K/UL — HIGH (ref 0–0.5)
EOSINOPHIL NFR BLD AUTO: 10 % — HIGH (ref 0–6)
HCT VFR BLD CALC: 39.1 % — SIGNIFICANT CHANGE UP (ref 39–50)
HGB BLD-MCNC: 13.3 G/DL — SIGNIFICANT CHANGE UP (ref 13–17)
IMM GRANULOCYTES NFR BLD AUTO: 1.4 % — SIGNIFICANT CHANGE UP (ref 0–1.5)
LYMPHOCYTES # BLD AUTO: 1.77 K/UL — SIGNIFICANT CHANGE UP (ref 1–3.3)
LYMPHOCYTES # BLD AUTO: 19.6 % — SIGNIFICANT CHANGE UP (ref 13–44)
MCHC RBC-ENTMCNC: 28.3 PG — SIGNIFICANT CHANGE UP (ref 27–34)
MCHC RBC-ENTMCNC: 34 G/DL — SIGNIFICANT CHANGE UP (ref 32–36)
MCV RBC AUTO: 83.2 FL — SIGNIFICANT CHANGE UP (ref 80–100)
MONOCYTES # BLD AUTO: 0.97 K/UL — HIGH (ref 0–0.9)
MONOCYTES NFR BLD AUTO: 10.7 % — SIGNIFICANT CHANGE UP (ref 2–14)
NEUTROPHILS # BLD AUTO: 5.19 K/UL — SIGNIFICANT CHANGE UP (ref 1.8–7.4)
NEUTROPHILS NFR BLD AUTO: 57.4 % — SIGNIFICANT CHANGE UP (ref 43–77)
NRBC # BLD: 0 /100 WBCS — SIGNIFICANT CHANGE UP (ref 0–0)
PLATELET # BLD AUTO: 269 K/UL — SIGNIFICANT CHANGE UP (ref 150–400)
RBC # BLD: 4.7 M/UL — SIGNIFICANT CHANGE UP (ref 4.2–5.8)
RBC # FLD: 13.2 % — SIGNIFICANT CHANGE UP (ref 10.3–14.5)
WBC # BLD: 9.04 K/UL — SIGNIFICANT CHANGE UP (ref 3.8–10.5)
WBC # FLD AUTO: 9.04 K/UL — SIGNIFICANT CHANGE UP (ref 3.8–10.5)

## 2021-11-11 RX ORDER — LIDOCAINE AND PRILOCAINE 25; 25 MG/G; MG/G
2.5-2.5 CREAM TOPICAL
Qty: 1 | Refills: 2 | Status: ACTIVE | COMMUNITY
Start: 2021-11-11 | End: 1900-01-01

## 2021-11-11 RX ORDER — PROCHLORPERAZINE MALEATE 10 MG/1
10 TABLET ORAL EVERY 6 HOURS
Qty: 20 | Refills: 2 | Status: ACTIVE | COMMUNITY
Start: 2021-11-11 | End: 1900-01-01

## 2021-11-12 ENCOUNTER — NON-APPOINTMENT (OUTPATIENT)
Age: 66
End: 2021-11-12

## 2021-11-12 ENCOUNTER — APPOINTMENT (OUTPATIENT)
Dept: INFUSION THERAPY | Facility: HOSPITAL | Age: 66
End: 2021-11-12

## 2021-11-12 ENCOUNTER — RX RENEWAL (OUTPATIENT)
Age: 66
End: 2021-11-12

## 2021-11-12 DIAGNOSIS — Z51.11 ENCOUNTER FOR ANTINEOPLASTIC CHEMOTHERAPY: ICD-10-CM

## 2021-11-12 DIAGNOSIS — R11.2 NAUSEA WITH VOMITING, UNSPECIFIED: ICD-10-CM

## 2021-11-15 ENCOUNTER — NON-APPOINTMENT (OUTPATIENT)
Age: 66
End: 2021-11-15

## 2021-11-16 ENCOUNTER — APPOINTMENT (OUTPATIENT)
Dept: SURGICAL ONCOLOGY | Facility: CLINIC | Age: 66
End: 2021-11-16
Payer: MEDICARE

## 2021-11-16 VITALS
BODY MASS INDEX: 30.81 KG/M2 | SYSTOLIC BLOOD PRESSURE: 124 MMHG | WEIGHT: 208 LBS | HEIGHT: 68.98 IN | DIASTOLIC BLOOD PRESSURE: 75 MMHG | OXYGEN SATURATION: 97 % | HEART RATE: 67 BPM | TEMPERATURE: 97.5 F | RESPIRATION RATE: 16 BRPM

## 2021-11-16 PROCEDURE — 99024 POSTOP FOLLOW-UP VISIT: CPT

## 2021-11-16 NOTE — HISTORY OF PRESENT ILLNESS
[de-identified] : 66 year male presents for an initial postop visit.  He was initially seen in consultation on 10/26/21, referred by Dr. Mk Campbell who is his wife's physician.  In May 2021 he began to experience epigastric discomfort exacerbated by eating acidic foods.  He felt this was related to coffee.  He was ultimately referred for an EGD with Dr. Fred Bee  on 10/19/21 to evaluate reflux symptoms (EGD delayed due to travel to [facility]).  Study was notable for ulceration in the gastric antrum/pre pyloric region.  The following biopsies were taken with the following pathology:\par \par 1) Stomach, pre pyloric mass #1: poorly differentiated carcinoma (favoring adenocarcinoma)\par 2) Stomach, pre pyloric mass #2: poorly differentiated carcinoma (favoring adenocarcinoma)\par 3) Stomach, antrum: no carcinoma, + H. pylori\par 4) Stomach, body: no carcinoma, + H. pylori\par \par CT of the abdomen and pelvis performed on 10/22/21 revealed a distal gastric mass with appearance of transmural extension along with adjacent perigastric lymphadenopathy and upper retroperitoneal lymphadenopathy.  Differentials include lymphoma and primary gastric carcinoma with luli metastatic disease.\par \par His past medical history includes thyroid cancer, type 2 diabetes.  Past surgical history includes an inguinal hernia repair in childhood.  Family history of liver cancer involving his father at 73.\par \par He was not prescribed triple therapy for H. pylori.  He states that his epigastric pain has improved on PPI and carafate as well as diet modifications.  He has no abdominal pain, nausea, vomiting, unintentional weight loss or early satiety. \par \par Chest CT 10/27/21- no evidence of intrathoracic metastatic disease.\par \par PET/CT 11/1/21- FDG avid gastric wall thickening.  FDG avid abdominal lymphadenopathy consistent with metastatic disease.\par \par PCP: Dr. Remy Suarez\par \par INTERVAL HISTORY:\par ***SURGERY: status post diagnostic laparoscopy, peritoneal washings, resection of perigastric lymph node and Mediport placement on 11/3/21.  Intraoperatively there were no signs of carcinomatosis. \par ***PATHOLOGY: Perihepatic and lesser sac washings negative for malignant cells. \par \par 11/16/21- Patient began chemotherapy with Dr. Albert Mercedes on 11/11/21.  He had an uneventful postop course.  States he is eating well without nausea/vomiting, moving his bowels without difficulty and denies any abdominal pain, fever or chills.

## 2021-11-16 NOTE — REASON FOR VISIT
[Post-Op] : a post-op for [FreeTextEntry2] : status post diagnostic laparoscopy, peritoneal washings, resection of perigastric lymph node and Mediport placement on 11/3/21

## 2021-11-16 NOTE — CONSULT LETTER
[Dear  ___] : Dear  [unfilled], [Courtesy Letter:] : I had the pleasure of seeing your patient, [unfilled], in my office today. [Please see my note below.] : Please see my note below. [Consult Closing:] : Thank you very much for allowing me to participate in the care of this patient.  If you have any questions, please do not hesitate to contact me. [Sincerely,] : Sincerely, [FreeTextEntry3] : Bashir Maloney MD, MPH, FACS, FSSO\par , [facility]\par [facility]\par Associate Professor of Surgery\par [employer] [facility]

## 2021-11-16 NOTE — ASSESSMENT
[FreeTextEntry1] : 66 year-old male with newly diagnosed locally advanced gastric cancer, concern for perigastric and para-aortic LN involvement on CT scan.\par \par Chest CT negative.  PET/CT demonstrating FDG-avid abdominal lymphadenopathy consistent with metastatic disease.\par \par Now status post diagnostic laparoscopy, peritoneal washings, resection of perigastric lymph node and Mediport placement on 11/3/21.  Intraoperatively there were no signs of carcinomatosis.  Perihepatic and lesser sac washings negative for malignant cells. \par \par Began neoadjuvant chemotherapy on 11/11/21.  Doing well clinically.\par \par PLAN:\par 1) Continue chemotherapy per Dr. Mercedes\par 2) RTO after 4 cycles and imaging

## 2021-11-16 NOTE — PHYSICAL EXAM
[Normal] : well developed, well nourished, in no acute distress [de-identified] : \[facility] [place] [facility]\"" clear [de-identified] : trochar sites healing well with no evidence of infection

## 2021-11-17 LAB — SURGICAL PATHOLOGY STUDY: SIGNIFICANT CHANGE UP

## 2021-11-24 ENCOUNTER — OUTPATIENT (OUTPATIENT)
Dept: OUTPATIENT SERVICES | Facility: HOSPITAL | Age: 66
LOS: 1 days | Discharge: HOME | End: 2021-11-24

## 2021-11-24 DIAGNOSIS — Z98.890 OTHER SPECIFIED POSTPROCEDURAL STATES: Chronic | ICD-10-CM

## 2021-11-24 DIAGNOSIS — C16.9 MALIGNANT NEOPLASM OF STOMACH, UNSPECIFIED: ICD-10-CM

## 2021-11-24 DIAGNOSIS — E89.0 POSTPROCEDURAL HYPOTHYROIDISM: Chronic | ICD-10-CM

## 2021-11-29 ENCOUNTER — LABORATORY RESULT (OUTPATIENT)
Age: 66
End: 2021-11-29

## 2021-11-29 ENCOUNTER — RESULT REVIEW (OUTPATIENT)
Age: 66
End: 2021-11-29

## 2021-11-29 ENCOUNTER — APPOINTMENT (OUTPATIENT)
Dept: HEMATOLOGY ONCOLOGY | Facility: CLINIC | Age: 66
End: 2021-11-29
Payer: MEDICARE

## 2021-11-29 ENCOUNTER — APPOINTMENT (OUTPATIENT)
Dept: INFUSION THERAPY | Facility: HOSPITAL | Age: 66
End: 2021-11-29

## 2021-11-29 VITALS
TEMPERATURE: 97.7 F | OXYGEN SATURATION: 96 % | BODY MASS INDEX: 33.26 KG/M2 | RESPIRATION RATE: 18 BRPM | HEART RATE: 75 BPM | DIASTOLIC BLOOD PRESSURE: 81 MMHG | SYSTOLIC BLOOD PRESSURE: 128 MMHG | WEIGHT: 209.44 LBS | HEIGHT: 66.69 IN

## 2021-11-29 DIAGNOSIS — Z01.818 ENCOUNTER FOR OTHER PREPROCEDURAL EXAMINATION: ICD-10-CM

## 2021-11-29 LAB
BASOPHILS # BLD AUTO: 0.02 K/UL — SIGNIFICANT CHANGE UP (ref 0–0.2)
BASOPHILS NFR BLD AUTO: 0.2 % — SIGNIFICANT CHANGE UP (ref 0–2)
EOSINOPHIL # BLD AUTO: 0.13 K/UL — SIGNIFICANT CHANGE UP (ref 0–0.5)
EOSINOPHIL NFR BLD AUTO: 1.2 % — SIGNIFICANT CHANGE UP (ref 0–6)
HCT VFR BLD CALC: 41.3 % — SIGNIFICANT CHANGE UP (ref 39–50)
HGB BLD-MCNC: 13.9 G/DL — SIGNIFICANT CHANGE UP (ref 13–17)
IMM GRANULOCYTES NFR BLD AUTO: 0.6 % — SIGNIFICANT CHANGE UP (ref 0–1.5)
LYMPHOCYTES # BLD AUTO: 0.83 K/UL — LOW (ref 1–3.3)
LYMPHOCYTES # BLD AUTO: 7.7 % — LOW (ref 13–44)
MCHC RBC-ENTMCNC: 28.2 PG — SIGNIFICANT CHANGE UP (ref 27–34)
MCHC RBC-ENTMCNC: 33.7 G/DL — SIGNIFICANT CHANGE UP (ref 32–36)
MCV RBC AUTO: 83.8 FL — SIGNIFICANT CHANGE UP (ref 80–100)
MONOCYTES # BLD AUTO: 0.41 K/UL — SIGNIFICANT CHANGE UP (ref 0–0.9)
MONOCYTES NFR BLD AUTO: 3.8 % — SIGNIFICANT CHANGE UP (ref 2–14)
NEUTROPHILS # BLD AUTO: 9.37 K/UL — HIGH (ref 1.8–7.4)
NEUTROPHILS NFR BLD AUTO: 86.5 % — HIGH (ref 43–77)
NRBC # BLD: 0 /100 WBCS — SIGNIFICANT CHANGE UP (ref 0–0)
PLATELET # BLD AUTO: 275 K/UL — SIGNIFICANT CHANGE UP (ref 150–400)
RBC # BLD: 4.93 M/UL — SIGNIFICANT CHANGE UP (ref 4.2–5.8)
RBC # FLD: 13.6 % — SIGNIFICANT CHANGE UP (ref 10.3–14.5)
WBC # BLD: 10.83 K/UL — HIGH (ref 3.8–10.5)
WBC # FLD AUTO: 10.83 K/UL — HIGH (ref 3.8–10.5)

## 2021-11-29 PROCEDURE — 99214 OFFICE O/P EST MOD 30 MIN: CPT

## 2021-11-29 RX ORDER — AMOXICILLIN 500 MG/1
500 TABLET, FILM COATED ORAL
Qty: 56 | Refills: 0 | Status: DISCONTINUED | COMMUNITY
Start: 2021-10-26 | End: 2021-11-29

## 2021-11-29 RX ORDER — CLARITHROMYCIN 500 MG/1
500 TABLET, FILM COATED ORAL
Qty: 28 | Refills: 0 | Status: DISCONTINUED | COMMUNITY
Start: 2021-10-26 | End: 2021-11-29

## 2021-11-30 ENCOUNTER — APPOINTMENT (OUTPATIENT)
Dept: INFUSION THERAPY | Facility: HOSPITAL | Age: 66
End: 2021-11-30

## 2021-11-30 DIAGNOSIS — R11.2 NAUSEA WITH VOMITING, UNSPECIFIED: ICD-10-CM

## 2021-11-30 DIAGNOSIS — Z51.11 ENCOUNTER FOR ANTINEOPLASTIC CHEMOTHERAPY: ICD-10-CM

## 2021-12-01 ENCOUNTER — NON-APPOINTMENT (OUTPATIENT)
Age: 66
End: 2021-12-01

## 2021-12-05 PROBLEM — Z01.818 PREOP TESTING: Status: RESOLVED | Noted: 2021-11-01 | Resolved: 2021-12-05

## 2021-12-05 NOTE — HISTORY OF PRESENT ILLNESS
[Disease: _____________________] : Disease: [unfilled] [Date: ____________] : Patient's last distress assessment performed on [unfilled]. [4 - Distress Level] : Distress Level: 4 [Patient given social work contact information and resource sheet] : Patient was given social work contact information and resource sheet [AJCC Stage: ____] : AJCC Stage: [unfilled] [de-identified] : Patient is a 65 y/o M with known PMHx of hypothyroidism secondary to thyroidectomy from previous thyroid CA and NIDDM who first presented Dr. Cox c/o of epigastric pain made worse with eating since 5/2021 that patient thought was due to drinking too much coffee.  An EGD was performed on 10/19/21 that showed a large ulcerated mass in the gastric antrum/lesser curvature not involving the pylorus.  Biopsies were taken on the mass and were positive for poorly differentiated carcinoma favoring adenocarcinoma and H. pylori.  The patient was then sent for a CT A/P with contrast that showed a distal gastric mass with appearance of transmural extension along with adjacent perigastric lymphadenopathy and upper retroperitoneal lymphadenopathy.  The patient met with Dr. Maloney on 10/26/21 who ordered a CT Chest and a PET scan.  CT Chest on 10/27/21 showed no intrathoracic metastatic disease.  PET scan is scheduled for 11/2/21.  Plan is for ex-lap with Dr. Maloney with Port placement on 11/3/21.  The patient is here today to establish oncologic care.  The patient states that since starting triple therapy for H. pylori, his abdominal pain has improved.  Denies weight loss, anorexia, N/V, diarrhea, constipation, rectal bleeding or melena.\par \par \par FHx: father - liver cancer (diagnosed late stage in his 70's); paternal aunt - gastric cancer (diagnosed in her 50's, treated with resection)\par SHx: + tobacco 45 pack year history; + ETOH - wine with dinner [de-identified] : poorly differentiated carcinoma [de-identified] : CEA [de-identified] : her2-\par CPS >1\par BECCA [FreeTextEntry1] : s/p C1 FOLFOX  [de-identified] : comes in follow up , diagnostic lap negative,  para-aortic node not biopsied but other regional nodes c/w disease\par no fever or chills\par did well with folfox\par to get FLOT today and took decadron last night

## 2021-12-06 ENCOUNTER — NON-APPOINTMENT (OUTPATIENT)
Age: 66
End: 2021-12-06

## 2021-12-07 ENCOUNTER — APPOINTMENT (OUTPATIENT)
Dept: ENDOCRINOLOGY | Facility: CLINIC | Age: 66
End: 2021-12-07
Payer: MEDICARE

## 2021-12-07 VITALS
DIASTOLIC BLOOD PRESSURE: 79 MMHG | HEART RATE: 81 BPM | SYSTOLIC BLOOD PRESSURE: 121 MMHG | TEMPERATURE: 98.7 F | BODY MASS INDEX: 32.49 KG/M2 | HEIGHT: 67 IN | OXYGEN SATURATION: 96 % | WEIGHT: 207 LBS
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
## 2021-12-07 LAB
BASOPHILS # BLD AUTO: 0.02 K/UL
BASOPHILS NFR BLD AUTO: 0.3 %
EOSINOPHIL # BLD AUTO: 0.11 K/UL
EOSINOPHIL NFR BLD AUTO: 1.8 %
HBA1C MFR BLD HPLC: 6.9
HCT VFR BLD CALC: 44.9 %
HGB BLD-MCNC: 14.8 G/DL
IMM GRANULOCYTES NFR BLD AUTO: 0.3 %
LYMPHOCYTES # BLD AUTO: 2.24 K/UL
LYMPHOCYTES NFR BLD AUTO: 35.8 %
MAN DIFF?: NORMAL
MCHC RBC-ENTMCNC: 27.9 PG
MCHC RBC-ENTMCNC: 33 GM/DL
MCV RBC AUTO: 84.6 FL
MONOCYTES # BLD AUTO: 0.76 K/UL
MONOCYTES NFR BLD AUTO: 12.2 %
NEUTROPHILS # BLD AUTO: 3.1 K/UL
NEUTROPHILS NFR BLD AUTO: 49.6 %
PLATELET # BLD AUTO: 236 K/UL
RBC # BLD: 5.31 M/UL
RBC # FLD: 13.9 %
WBC # FLD AUTO: 6.25 K/UL

## 2021-12-07 PROCEDURE — 99214 OFFICE O/P EST MOD 30 MIN: CPT | Mod: 25

## 2021-12-07 PROCEDURE — 36415 COLL VENOUS BLD VENIPUNCTURE: CPT

## 2021-12-07 PROCEDURE — 83036 HEMOGLOBIN GLYCOSYLATED A1C: CPT | Mod: QW

## 2021-12-07 RX ORDER — AMOXICILLIN 500 MG/1
500 CAPSULE ORAL
Qty: 56 | Refills: 0 | Status: DISCONTINUED | COMMUNITY
Start: 2021-10-26

## 2021-12-07 NOTE — ASSESSMENT
[Carbohydrate Consistent Diet] : carbohydrate consistent diet [Diabetes Foot Care] : diabetes foot care [Long Term Vascular Complications] : long term vascular complications of diabetes [Smoking Cessation] : smoking cessation [FreeTextEntry1] : 66 y.o. male with h/o Type 2 DM, hypothyroidism s/p surgery and I131 for papillary thyroid cancer. \par \par 1. Type 2 DM- Good control with Hba1c of 6.9% today. Encouraged carbohydrate consistent diet and exercise. Needs to limit fruit intake. Will continue Metformin ER 1,000 mg daily and stress compliance. Recommend SMBG. Reviewed blood glucose targets.  Will check urine microalb/cr ratio.\par \par 2. BP is at goal and will continue Lisinopril for renal protection\par \par 3. Will check lipids and discussed statin for CV risk reduction. Patient declines statin at this time.\par \par 4. Hypothyroidism/papillary thyroid cancer- Clinically stable. Will check TFTs and Tg level. Goal TSH is below 2.5. Will adjust Synthroid accordingly. UTD with neck ultrasound in December 2019 was unremarkable and will repeat ultrasound in 5 years. \par \par 5. Vitamin D def- Will check 25 vitamin D level and supplement if needed\par \par If stable, follow up in 6 months \par Follow up with podiatry and recommend treatment of tinea pedis\par Follow up with ophthalmology\par Labs drawn in the office today

## 2021-12-07 NOTE — REVIEW OF SYSTEMS
[Constipation] : constipation [Abdominal Pain] : abdominal pain [Heartburn] : heartburn [Pain/Numbness of Digits] : pain/numbness of digits [Negative] : Respiratory [Recent Weight Loss (___ Lbs)] : recent [unfilled] ~Ulb weight loss [Polydipsia] : no polydipsia [Swelling] : no swelling [All other systems negative] : All other systems negative

## 2021-12-07 NOTE — HISTORY OF PRESENT ILLNESS
[FreeTextEntry1] : 66 y.o. male with h/o hypothyroidism s/p surgery for papillary thyroid cancer and Type 2 DM here for follow up visit. Patient saw GI, Dr. Bee with c/o epigastric pain made with worse when eating. Had EGD on 10/19/21 showing a large ulcerated mass in the gastric antrum/lesser curvature not involving the pylorus. Biopsies were taken on the mass and were positive for poorly differentiated carcinoma favoring adenocarcinoma and H. pylori. Had exploratory lap and port placement on 11/3/21. PET CT scan on 11/3/21 showed distal gastric wall thickening and FDG avid abdominal lymphadenopathy compatible with metastatic disease.  Now seeing oncology and being treated with FOLFOX s/p C1. \par \par In regards to Type 2 DM, no polyuria and no polydipsia. Not monitoring FS at home. Taking Metformin ER 1,000 mg daily. Eating less and stopped drinking wine.  UTD with ophthalmology (12/14/2020) and no retinopathy. Had foot pain and using inserts. Does have podiatrist. Taking ACE-I for proteinuria. For breakfast, no more bagels and has rye with egg whites and coffee with no sugar. For lunch: pizza rarely or hamburger but sometimes skips. For dinner: More soup and pasta given GI disease. No sodas or juices. Likes fruits at night with occasional sweets. Still smoking. Does lots of walking. \par \par In regards to thyroid disease, taking Synthroid 150 mcg daily since 12/2020. No neck complaints. Had unremarkable neck ultrasound on 12/4/19. Had total thyroidectomy and  I131 in 1999. No SOB or palpitations. \par \par In regards to vitamin D def, off MVI as per oncology

## 2021-12-07 NOTE — PHYSICAL EXAM
[Alert] : alert [No Acute Distress] : no acute distress [Normal Sclera/Conjunctiva] : normal sclera/conjunctiva [EOMI] : extra ocular movement intact [No LAD] : no lymphadenopathy [Well Healed Scar] : well healed scar [No Respiratory Distress] : no respiratory distress [Clear to Auscultation] : lungs were clear to auscultation bilaterally [Normal S1, S2] : normal S1 and S2 [Regular Rhythm] : with a regular rhythm [No Edema] : no peripheral edema [Pedal Pulses Normal] : the pedal pulses are present [Normal Bowel Sounds] : normal bowel sounds [Not Tender] : non-tender [Not Distended] : not distended [Soft] : abdomen soft [Normal Anterior Cervical Nodes] : no anterior cervical lymphadenopathy [No Clubbing, Cyanosis] : no clubbing  or cyanosis of the fingernails [No Rash] : no rash [2+] : 2+ in the dorsalis pedis [Normal Reflexes] : deep tendon reflexes were 2+ and symmetric [Normal Affect] : the affect was normal [Normal Mood] : the mood was normal [Right foot was examined, including] : right foot ~C was examined, including visual inspection with sensory and pulse exams [Left foot was examined, including] : left foot ~C was examined, including visual inspection with sensory and pulse exams [Diminished Throughout Both Feet] : normal tactile sensation with monofilament testing throughout both feet [FreeTextEntry1] : callus [FreeTextEntry2] : tinea pedis [FreeTextEntry5] : callus [FreeTextEntry6] : tinea pedis

## 2021-12-08 LAB
RAPID RVP RESULT: DETECTED
RSV RNA SPEC QL NAA+PROBE: DETECTED
SARS-COV-2 RNA PNL RESP NAA+PROBE: NOT DETECTED

## 2021-12-10 ENCOUNTER — APPOINTMENT (OUTPATIENT)
Dept: HEMATOLOGY ONCOLOGY | Facility: CLINIC | Age: 66
End: 2021-12-10
Payer: MEDICARE

## 2021-12-10 VITALS
OXYGEN SATURATION: 97 % | SYSTOLIC BLOOD PRESSURE: 129 MMHG | DIASTOLIC BLOOD PRESSURE: 82 MMHG | BODY MASS INDEX: 33.15 KG/M2 | WEIGHT: 211.64 LBS | TEMPERATURE: 98 F | HEART RATE: 75 BPM | RESPIRATION RATE: 16 BRPM

## 2021-12-10 LAB
25(OH)D3 SERPL-MCNC: 23 NG/ML
CREAT SPEC-SCNC: 153 MG/DL
MICROALBUMIN 24H UR DL<=1MG/L-MCNC: 6.9 MG/DL
MICROALBUMIN/CREAT 24H UR-RTO: 45 MG/G
T4 FREE SERPL-MCNC: 1.1 NG/DL
THYROGLOB AB SERPL-ACNC: <20 IU/ML
THYROGLOB SERPL-MCNC: <0.2 NG/ML
TSH SERPL-ACNC: 9.12 UIU/ML

## 2021-12-10 PROCEDURE — 99214 OFFICE O/P EST MOD 30 MIN: CPT

## 2021-12-12 NOTE — HISTORY OF PRESENT ILLNESS
[Disease: _____________________] : Disease: [unfilled] [AJCC Stage: ____] : AJCC Stage: [unfilled] [de-identified] : Patient is a 67 y/o M with known PMHx of hypothyroidism secondary to thyroidectomy from previous thyroid CA and NIDDM who first presented Dr. Cox c/o of epigastric pain made worse with eating since 5/2021 that patient thought was due to drinking too much coffee.  An EGD was performed on 10/19/21 that showed a large ulcerated mass in the gastric antrum/lesser curvature not involving the pylorus.  Biopsies were taken on the mass and were positive for poorly differentiated carcinoma favoring adenocarcinoma and H. pylori.  The patient was then sent for a CT A/P with contrast that showed a distal gastric mass with appearance of transmural extension along with adjacent perigastric lymphadenopathy and upper retroperitoneal lymphadenopathy.  The patient met with Dr. Maloney on 10/26/21 who ordered a CT Chest and a PET scan.  CT Chest on 10/27/21 showed no intrathoracic metastatic disease.  PET scan is scheduled for 11/2/21.  Plan is for ex-lap with Dr. Maloney with Port placement on 11/3/21.  The patient is here today to establish oncologic care.  The patient states that since starting triple therapy for H. pylori, his abdominal pain has improved.  Denies weight loss, anorexia, N/V, diarrhea, constipation, rectal bleeding or melena.\par \par \par FHx: father - liver cancer (diagnosed late stage in his 70's); paternal aunt - gastric cancer (diagnosed in her 50's, treated with resection)\par SHx: + tobacco 45 pack year history; + ETOH - wine with dinner [de-identified] : poorly differentiated carcinoma [de-identified] : CEA [de-identified] : her2-\par CPS >1\par MSI-High\par TMB - 21 Muts/Mb\par \par KIT R177H\par PTEN K267fs*9, T319fs*1\par APC Q9447wi*29\par ASXL1 G645fs*58\par CIC S3521hj*91\par CTNNA1 N738fs*53\par EPHB4 amplification\par MLH1 loss\par PBRM1 B3118bp*102\par SMAD4 R361H\par TP53 R248W  [FreeTextEntry1] : FLOT started 11/11/21 [de-identified] : Patient presents for follow up and reports that overall he feels well.  He had a dry cough and low grade fever a week ago for which he had a RVP and tested positive for RSV.  He states that fever and cough have resolved.  Denies URI sxs, HA, dizziness, CP, SOB, MCCALLUM, N/V, abdominal pain, diarrhea.

## 2021-12-12 NOTE — HISTORY OF PRESENT ILLNESS
[Disease: _____________________] : Disease: [unfilled] [AJCC Stage: ____] : AJCC Stage: [unfilled] [de-identified] : Patient is a 65 y/o M with known PMHx of hypothyroidism secondary to thyroidectomy from previous thyroid CA and NIDDM who first presented Dr. Cox c/o of epigastric pain made worse with eating since 5/2021 that patient thought was due to drinking too much coffee.  An EGD was performed on 10/19/21 that showed a large ulcerated mass in the gastric antrum/lesser curvature not involving the pylorus.  Biopsies were taken on the mass and were positive for poorly differentiated carcinoma favoring adenocarcinoma and H. pylori.  The patient was then sent for a CT A/P with contrast that showed a distal gastric mass with appearance of transmural extension along with adjacent perigastric lymphadenopathy and upper retroperitoneal lymphadenopathy.  The patient met with Dr. Maloney on 10/26/21 who ordered a CT Chest and a PET scan.  CT Chest on 10/27/21 showed no intrathoracic metastatic disease.  PET scan is scheduled for 11/2/21.  Plan is for ex-lap with Dr. Maloney with Port placement on 11/3/21.  The patient is here today to establish oncologic care.  The patient states that since starting triple therapy for H. pylori, his abdominal pain has improved.  Denies weight loss, anorexia, N/V, diarrhea, constipation, rectal bleeding or melena.\par \par \par FHx: father - liver cancer (diagnosed late stage in his 70's); paternal aunt - gastric cancer (diagnosed in her 50's, treated with resection)\par SHx: + tobacco 45 pack year history; + ETOH - wine with dinner [de-identified] : poorly differentiated carcinoma [de-identified] : CEA [de-identified] : her2-\par CPS >1\par BECCA [FreeTextEntry1] : FLOT started 11/11/21 [de-identified] : s/p C2 FLOT\par Recent RSV infection

## 2021-12-12 NOTE — PHYSICAL EXAM
[Fully active, able to carry on all pre-disease performance without restriction] : Status 0 - Fully active, able to carry on all pre-disease performance without restriction [Normal] : full range of motion and no deformities appreciated [de-identified] : anicteric [de-identified] : no JVD [de-identified] : soft NT/ND

## 2021-12-13 ENCOUNTER — APPOINTMENT (OUTPATIENT)
Dept: INFUSION THERAPY | Facility: HOSPITAL | Age: 66
End: 2021-12-13

## 2021-12-13 ENCOUNTER — RESULT REVIEW (OUTPATIENT)
Age: 66
End: 2021-12-13

## 2021-12-13 LAB
ALBUMIN SERPL ELPH-MCNC: 3.8 G/DL — SIGNIFICANT CHANGE UP (ref 3.3–5)
ALP SERPL-CCNC: 72 U/L — SIGNIFICANT CHANGE UP (ref 40–120)
ALT FLD-CCNC: 49 U/L — HIGH (ref 10–45)
ANION GAP SERPL CALC-SCNC: 18 MMOL/L — HIGH (ref 5–17)
AST SERPL-CCNC: 40 U/L — SIGNIFICANT CHANGE UP (ref 10–40)
B BURGDOR AB SER-IMP: NEGATIVE
B BURGDOR IGM PATRN SER IB-IMP: NEGATIVE
B BURGDOR18KD IGG SER QL IB: NORMAL
B BURGDOR23KD IGG SER QL IB: NORMAL
B BURGDOR23KD IGM SER QL IB: NORMAL
B BURGDOR28KD IGG SER QL IB: NORMAL
B BURGDOR30KD IGG SER QL IB: NORMAL
B BURGDOR31KD IGG SER QL IB: NORMAL
B BURGDOR39KD IGG SER QL IB: NORMAL
B BURGDOR39KD IGM SER QL IB: NORMAL
B BURGDOR41KD IGG SER QL IB: PRESENT
B BURGDOR41KD IGM SER QL IB: PRESENT
B BURGDOR45KD IGG SER QL IB: NORMAL
B BURGDOR58KD IGG SER QL IB: PRESENT
B BURGDOR66KD IGG SER QL IB: NORMAL
B BURGDOR93KD IGG SER QL IB: PRESENT
BASOPHILS # BLD AUTO: 0.13 K/UL — SIGNIFICANT CHANGE UP (ref 0–0.2)
BASOPHILS NFR BLD AUTO: 2 % — SIGNIFICANT CHANGE UP (ref 0–2)
BILIRUB SERPL-MCNC: <0.2 MG/DL — SIGNIFICANT CHANGE UP (ref 0.2–1.2)
BUN SERPL-MCNC: 18 MG/DL — SIGNIFICANT CHANGE UP (ref 7–23)
CALCIUM SERPL-MCNC: 8.5 MG/DL — SIGNIFICANT CHANGE UP (ref 8.4–10.5)
CHLORIDE SERPL-SCNC: 106 MMOL/L — SIGNIFICANT CHANGE UP (ref 96–108)
CO2 SERPL-SCNC: 20 MMOL/L — LOW (ref 22–31)
CREAT SERPL-MCNC: 0.93 MG/DL — SIGNIFICANT CHANGE UP (ref 0.5–1.3)
EOSINOPHIL # BLD AUTO: 0.27 K/UL — SIGNIFICANT CHANGE UP (ref 0–0.5)
EOSINOPHIL NFR BLD AUTO: 4 % — SIGNIFICANT CHANGE UP (ref 0–6)
GLUCOSE SERPL-MCNC: 184 MG/DL — HIGH (ref 70–99)
HCT VFR BLD CALC: 41 % — SIGNIFICANT CHANGE UP (ref 39–50)
HGB BLD-MCNC: 14 G/DL — SIGNIFICANT CHANGE UP (ref 13–17)
LYMPHOCYTES # BLD AUTO: 2.39 K/UL — SIGNIFICANT CHANGE UP (ref 1–3.3)
LYMPHOCYTES # BLD AUTO: 36 % — SIGNIFICANT CHANGE UP (ref 13–44)
MCHC RBC-ENTMCNC: 28.2 PG — SIGNIFICANT CHANGE UP (ref 27–34)
MCHC RBC-ENTMCNC: 34.1 G/DL — SIGNIFICANT CHANGE UP (ref 32–36)
MCV RBC AUTO: 82.5 FL — SIGNIFICANT CHANGE UP (ref 80–100)
METAMYELOCYTES # FLD: 4 % — HIGH (ref 0–0)
MONOCYTES # BLD AUTO: 0.86 K/UL — SIGNIFICANT CHANGE UP (ref 0–0.9)
MONOCYTES NFR BLD AUTO: 13 % — SIGNIFICANT CHANGE UP (ref 2–14)
MYELOCYTES NFR BLD: 2 % — HIGH (ref 0–0)
NEUTROPHILS # BLD AUTO: 2.59 K/UL — SIGNIFICANT CHANGE UP (ref 1.8–7.4)
NEUTROPHILS NFR BLD AUTO: 39 % — LOW (ref 43–77)
NRBC # BLD: 0 /100 — SIGNIFICANT CHANGE UP (ref 0–0)
NRBC # BLD: SIGNIFICANT CHANGE UP /100 WBCS (ref 0–0)
PLAT MORPH BLD: NORMAL — SIGNIFICANT CHANGE UP
PLATELET # BLD AUTO: 295 K/UL — SIGNIFICANT CHANGE UP (ref 150–400)
POTASSIUM SERPL-MCNC: 4.2 MMOL/L — SIGNIFICANT CHANGE UP (ref 3.5–5.3)
POTASSIUM SERPL-SCNC: 4.2 MMOL/L — SIGNIFICANT CHANGE UP (ref 3.5–5.3)
PROT SERPL-MCNC: 6.1 G/DL — SIGNIFICANT CHANGE UP (ref 6–8.3)
RBC # BLD: 4.97 M/UL — SIGNIFICANT CHANGE UP (ref 4.2–5.8)
RBC # FLD: 14.2 % — SIGNIFICANT CHANGE UP (ref 10.3–14.5)
RBC BLD AUTO: SIGNIFICANT CHANGE UP
SODIUM SERPL-SCNC: 144 MMOL/L — SIGNIFICANT CHANGE UP (ref 135–145)
TSH SERPL-MCNC: 10.3 UIU/ML — HIGH (ref 0.27–4.2)
WBC # BLD: 6.63 K/UL — SIGNIFICANT CHANGE UP (ref 3.8–10.5)
WBC # FLD AUTO: 6.63 K/UL — SIGNIFICANT CHANGE UP (ref 3.8–10.5)

## 2021-12-15 ENCOUNTER — APPOINTMENT (OUTPATIENT)
Dept: INFUSION THERAPY | Facility: HOSPITAL | Age: 66
End: 2021-12-15

## 2021-12-22 ENCOUNTER — OUTPATIENT (OUTPATIENT)
Dept: OUTPATIENT SERVICES | Facility: HOSPITAL | Age: 66
LOS: 1 days | Discharge: HOME | End: 2021-12-22

## 2021-12-22 DIAGNOSIS — C16.9 MALIGNANT NEOPLASM OF STOMACH, UNSPECIFIED: ICD-10-CM

## 2021-12-22 DIAGNOSIS — Z98.890 OTHER SPECIFIED POSTPROCEDURAL STATES: Chronic | ICD-10-CM

## 2021-12-22 DIAGNOSIS — E89.0 POSTPROCEDURAL HYPOTHYROIDISM: Chronic | ICD-10-CM

## 2021-12-27 ENCOUNTER — RESULT REVIEW (OUTPATIENT)
Age: 66
End: 2021-12-27

## 2021-12-27 ENCOUNTER — APPOINTMENT (OUTPATIENT)
Dept: INFUSION THERAPY | Facility: HOSPITAL | Age: 66
End: 2021-12-27

## 2021-12-27 LAB
ALBUMIN SERPL ELPH-MCNC: 3.9 G/DL — SIGNIFICANT CHANGE UP (ref 3.3–5)
ALP SERPL-CCNC: 83 U/L — SIGNIFICANT CHANGE UP (ref 40–120)
ALT FLD-CCNC: 26 U/L — SIGNIFICANT CHANGE UP (ref 10–45)
ANION GAP SERPL CALC-SCNC: 11 MMOL/L — SIGNIFICANT CHANGE UP (ref 5–17)
AST SERPL-CCNC: 31 U/L — SIGNIFICANT CHANGE UP (ref 10–40)
BASOPHILS # BLD AUTO: 0.05 K/UL — SIGNIFICANT CHANGE UP (ref 0–0.2)
BASOPHILS NFR BLD AUTO: 0.8 % — SIGNIFICANT CHANGE UP (ref 0–2)
BILIRUB SERPL-MCNC: 0.2 MG/DL — SIGNIFICANT CHANGE UP (ref 0.2–1.2)
BUN SERPL-MCNC: 20 MG/DL — SIGNIFICANT CHANGE UP (ref 7–23)
CALCIUM SERPL-MCNC: 8.7 MG/DL — SIGNIFICANT CHANGE UP (ref 8.4–10.5)
CHLORIDE SERPL-SCNC: 109 MMOL/L — HIGH (ref 96–108)
CO2 SERPL-SCNC: 24 MMOL/L — SIGNIFICANT CHANGE UP (ref 22–31)
CREAT SERPL-MCNC: 0.94 MG/DL — SIGNIFICANT CHANGE UP (ref 0.5–1.3)
EOSINOPHIL # BLD AUTO: 0.59 K/UL — HIGH (ref 0–0.5)
EOSINOPHIL NFR BLD AUTO: 9.6 % — HIGH (ref 0–6)
GLUCOSE SERPL-MCNC: 173 MG/DL — HIGH (ref 70–99)
HCT VFR BLD CALC: 41.2 % — SIGNIFICANT CHANGE UP (ref 39–50)
HGB BLD-MCNC: 14 G/DL — SIGNIFICANT CHANGE UP (ref 13–17)
IMM GRANULOCYTES NFR BLD AUTO: 0.5 % — SIGNIFICANT CHANGE UP (ref 0–1.5)
LYMPHOCYTES # BLD AUTO: 1.46 K/UL — SIGNIFICANT CHANGE UP (ref 1–3.3)
LYMPHOCYTES # BLD AUTO: 23.8 % — SIGNIFICANT CHANGE UP (ref 13–44)
MCHC RBC-ENTMCNC: 28 PG — SIGNIFICANT CHANGE UP (ref 27–34)
MCHC RBC-ENTMCNC: 34 G/DL — SIGNIFICANT CHANGE UP (ref 32–36)
MCV RBC AUTO: 82.4 FL — SIGNIFICANT CHANGE UP (ref 80–100)
MONOCYTES # BLD AUTO: 0.9 K/UL — SIGNIFICANT CHANGE UP (ref 0–0.9)
MONOCYTES NFR BLD AUTO: 14.7 % — HIGH (ref 2–14)
NEUTROPHILS # BLD AUTO: 3.11 K/UL — SIGNIFICANT CHANGE UP (ref 1.8–7.4)
NEUTROPHILS NFR BLD AUTO: 50.6 % — SIGNIFICANT CHANGE UP (ref 43–77)
NRBC # BLD: 0 /100 WBCS — SIGNIFICANT CHANGE UP (ref 0–0)
PLATELET # BLD AUTO: 200 K/UL — SIGNIFICANT CHANGE UP (ref 150–400)
POTASSIUM SERPL-MCNC: 4.1 MMOL/L — SIGNIFICANT CHANGE UP (ref 3.5–5.3)
POTASSIUM SERPL-SCNC: 4.1 MMOL/L — SIGNIFICANT CHANGE UP (ref 3.5–5.3)
PROT SERPL-MCNC: 5.9 G/DL — LOW (ref 6–8.3)
RBC # BLD: 5 M/UL — SIGNIFICANT CHANGE UP (ref 4.2–5.8)
RBC # FLD: 14.8 % — HIGH (ref 10.3–14.5)
SODIUM SERPL-SCNC: 145 MMOL/L — SIGNIFICANT CHANGE UP (ref 135–145)
WBC # BLD: 6.14 K/UL — SIGNIFICANT CHANGE UP (ref 3.8–10.5)
WBC # FLD AUTO: 6.14 K/UL — SIGNIFICANT CHANGE UP (ref 3.8–10.5)

## 2021-12-27 RX ORDER — AMOXICILLIN 250 MG/5ML
2 SUSPENSION, RECONSTITUTED, ORAL (ML) ORAL
Qty: 0 | Refills: 0 | DISCHARGE

## 2021-12-27 RX ORDER — CLARITHROMYCIN 500 MG
1 TABLET ORAL
Qty: 0 | Refills: 0 | DISCHARGE

## 2021-12-28 DIAGNOSIS — Z51.11 ENCOUNTER FOR ANTINEOPLASTIC CHEMOTHERAPY: ICD-10-CM

## 2021-12-28 DIAGNOSIS — R11.2 NAUSEA WITH VOMITING, UNSPECIFIED: ICD-10-CM

## 2021-12-29 ENCOUNTER — APPOINTMENT (OUTPATIENT)
Dept: INFUSION THERAPY | Facility: HOSPITAL | Age: 66
End: 2021-12-29

## 2021-12-31 ENCOUNTER — APPOINTMENT (OUTPATIENT)
Dept: NUCLEAR MEDICINE | Facility: IMAGING CENTER | Age: 66
End: 2021-12-31
Payer: MEDICARE

## 2021-12-31 ENCOUNTER — OUTPATIENT (OUTPATIENT)
Dept: OUTPATIENT SERVICES | Facility: HOSPITAL | Age: 66
LOS: 1 days | End: 2021-12-31
Payer: MEDICARE

## 2021-12-31 DIAGNOSIS — C16.9 MALIGNANT NEOPLASM OF STOMACH, UNSPECIFIED: ICD-10-CM

## 2021-12-31 DIAGNOSIS — E89.0 POSTPROCEDURAL HYPOTHYROIDISM: Chronic | ICD-10-CM

## 2021-12-31 DIAGNOSIS — Z98.890 OTHER SPECIFIED POSTPROCEDURAL STATES: Chronic | ICD-10-CM

## 2021-12-31 PROCEDURE — A9552: CPT

## 2021-12-31 PROCEDURE — 78815 PET IMAGE W/CT SKULL-THIGH: CPT

## 2021-12-31 PROCEDURE — 78815 PET IMAGE W/CT SKULL-THIGH: CPT | Mod: 26,PS,MH

## 2022-01-07 ENCOUNTER — APPOINTMENT (OUTPATIENT)
Dept: HEMATOLOGY ONCOLOGY | Facility: CLINIC | Age: 67
End: 2022-01-07
Payer: MEDICARE

## 2022-01-07 VITALS
DIASTOLIC BLOOD PRESSURE: 78 MMHG | WEIGHT: 211.64 LBS | BODY MASS INDEX: 33.15 KG/M2 | HEART RATE: 66 BPM | TEMPERATURE: 97 F | OXYGEN SATURATION: 96 % | SYSTOLIC BLOOD PRESSURE: 123 MMHG | RESPIRATION RATE: 16 BRPM

## 2022-01-07 PROCEDURE — 99214 OFFICE O/P EST MOD 30 MIN: CPT

## 2022-01-10 ENCOUNTER — RESULT REVIEW (OUTPATIENT)
Age: 67
End: 2022-01-10

## 2022-01-10 ENCOUNTER — APPOINTMENT (OUTPATIENT)
Dept: INFUSION THERAPY | Facility: HOSPITAL | Age: 67
End: 2022-01-10

## 2022-01-10 ENCOUNTER — RX RENEWAL (OUTPATIENT)
Age: 67
End: 2022-01-10

## 2022-01-10 LAB
ALBUMIN SERPL ELPH-MCNC: 4.2 G/DL — SIGNIFICANT CHANGE UP (ref 3.3–5)
ALP SERPL-CCNC: 84 U/L — SIGNIFICANT CHANGE UP (ref 40–120)
ALT FLD-CCNC: 27 U/L — SIGNIFICANT CHANGE UP (ref 10–45)
ANION GAP SERPL CALC-SCNC: 11 MMOL/L — SIGNIFICANT CHANGE UP (ref 5–17)
AST SERPL-CCNC: 23 U/L — SIGNIFICANT CHANGE UP (ref 10–40)
BASOPHILS # BLD AUTO: 0.08 K/UL — SIGNIFICANT CHANGE UP (ref 0–0.2)
BASOPHILS NFR BLD AUTO: 1.3 % — SIGNIFICANT CHANGE UP (ref 0–2)
BILIRUB SERPL-MCNC: 0.2 MG/DL — SIGNIFICANT CHANGE UP (ref 0.2–1.2)
BUN SERPL-MCNC: 16 MG/DL — SIGNIFICANT CHANGE UP (ref 7–23)
CALCIUM SERPL-MCNC: 9.3 MG/DL — SIGNIFICANT CHANGE UP (ref 8.4–10.5)
CHLORIDE SERPL-SCNC: 106 MMOL/L — SIGNIFICANT CHANGE UP (ref 96–108)
CO2 SERPL-SCNC: 25 MMOL/L — SIGNIFICANT CHANGE UP (ref 22–31)
CREAT SERPL-MCNC: 0.87 MG/DL — SIGNIFICANT CHANGE UP (ref 0.5–1.3)
EOSINOPHIL # BLD AUTO: 0.2 K/UL — SIGNIFICANT CHANGE UP (ref 0–0.5)
EOSINOPHIL NFR BLD AUTO: 3.1 % — SIGNIFICANT CHANGE UP (ref 0–6)
GLUCOSE SERPL-MCNC: 159 MG/DL — HIGH (ref 70–99)
HCT VFR BLD CALC: 41.9 % — SIGNIFICANT CHANGE UP (ref 39–50)
HGB BLD-MCNC: 13.9 G/DL — SIGNIFICANT CHANGE UP (ref 13–17)
IMM GRANULOCYTES NFR BLD AUTO: 1.3 % — SIGNIFICANT CHANGE UP (ref 0–1.5)
LYMPHOCYTES # BLD AUTO: 1.84 K/UL — SIGNIFICANT CHANGE UP (ref 1–3.3)
LYMPHOCYTES # BLD AUTO: 28.9 % — SIGNIFICANT CHANGE UP (ref 13–44)
MCHC RBC-ENTMCNC: 27.9 PG — SIGNIFICANT CHANGE UP (ref 27–34)
MCHC RBC-ENTMCNC: 33.2 G/DL — SIGNIFICANT CHANGE UP (ref 32–36)
MCV RBC AUTO: 84 FL — SIGNIFICANT CHANGE UP (ref 80–100)
MONOCYTES # BLD AUTO: 0.71 K/UL — SIGNIFICANT CHANGE UP (ref 0–0.9)
MONOCYTES NFR BLD AUTO: 11.2 % — SIGNIFICANT CHANGE UP (ref 2–14)
NEUTROPHILS # BLD AUTO: 3.45 K/UL — SIGNIFICANT CHANGE UP (ref 1.8–7.4)
NEUTROPHILS NFR BLD AUTO: 54.2 % — SIGNIFICANT CHANGE UP (ref 43–77)
NRBC # BLD: 0 /100 WBCS — SIGNIFICANT CHANGE UP (ref 0–0)
PLATELET # BLD AUTO: 218 K/UL — SIGNIFICANT CHANGE UP (ref 150–400)
POTASSIUM SERPL-MCNC: 4 MMOL/L — SIGNIFICANT CHANGE UP (ref 3.5–5.3)
POTASSIUM SERPL-SCNC: 4 MMOL/L — SIGNIFICANT CHANGE UP (ref 3.5–5.3)
PROT SERPL-MCNC: 6.4 G/DL — SIGNIFICANT CHANGE UP (ref 6–8.3)
RBC # BLD: 4.99 M/UL — SIGNIFICANT CHANGE UP (ref 4.2–5.8)
RBC # FLD: 16.2 % — HIGH (ref 10.3–14.5)
SODIUM SERPL-SCNC: 142 MMOL/L — SIGNIFICANT CHANGE UP (ref 135–145)
WBC # BLD: 6.36 K/UL — SIGNIFICANT CHANGE UP (ref 3.8–10.5)
WBC # FLD AUTO: 6.36 K/UL — SIGNIFICANT CHANGE UP (ref 3.8–10.5)

## 2022-01-11 NOTE — HISTORY OF PRESENT ILLNESS
[Disease: _____________________] : Disease: [unfilled] [AJCC Stage: ____] : AJCC Stage: [unfilled] [de-identified] : Patient is a 67 y/o M with known PMHx of hypothyroidism secondary to thyroidectomy from previous thyroid CA and NIDDM who first presented Dr. Cox c/o of epigastric pain made worse with eating since 5/2021 that patient thought was due to drinking too much coffee.  An EGD was performed on 10/19/21 that showed a large ulcerated mass in the gastric antrum/lesser curvature not involving the pylorus.  Biopsies were taken on the mass and were positive for poorly differentiated carcinoma favoring adenocarcinoma and H. pylori.  The patient was then sent for a CT A/P with contrast that showed a distal gastric mass with appearance of transmural extension along with adjacent perigastric lymphadenopathy and upper retroperitoneal lymphadenopathy.  The patient met with Dr. Maloney on 10/26/21 who ordered a CT Chest and a PET scan.  CT Chest on 10/27/21 showed no intrathoracic metastatic disease.  PET scan is scheduled for 11/2/21.  Plan is for ex-lap with Dr. Maloney with Port placement on 11/3/21.  The patient is here today to establish oncologic care.  The patient states that since starting triple therapy for H. pylori, his abdominal pain has improved.  Denies weight loss, anorexia, N/V, diarrhea, constipation, rectal bleeding or melena.\par \par \par FHx: father - liver cancer (diagnosed late stage in his 70's); paternal aunt - gastric cancer (diagnosed in her 50's, treated with resection)\par SHx: + tobacco 45 pack year history; + ETOH - wine with dinner [de-identified] : poorly differentiated carcinoma [de-identified] : CEA [de-identified] : her2-\par CPS >1\par MSI-High\par TMB - 21 Muts/Mb\par \par KIT R177H\par PTEN K267fs*9, T319fs*1\par APC P2887vo*29\par ASXL1 G645fs*58\par CIC C6722gk*91\par CTNNA1 N738fs*53\par EPHB4 amplification\par MLH1 loss\par PBRM1 Y7646zx*102\par SMAD4 R361H\par TP53 R248W  [FreeTextEntry1] : FLOT started 11/11/21 x 1 --> FOLFOX +pembrolizumab [de-identified] : presents with wife for follow up , he has no acute complaints, he is tolerating therapy well\par no neuropathy\par no abdominal pain, diarrhea or rash or cough

## 2022-01-11 NOTE — PHYSICAL EXAM
[Fully active, able to carry on all pre-disease performance without restriction] : Status 0 - Fully active, able to carry on all pre-disease performance without restriction [Normal] : affect appropriate [de-identified] : anicteric  [de-identified] : normal respiratory effort, no audible wheeze [de-identified] : reg rate

## 2022-01-12 ENCOUNTER — APPOINTMENT (OUTPATIENT)
Dept: INFUSION THERAPY | Facility: HOSPITAL | Age: 67
End: 2022-01-12

## 2022-01-20 ENCOUNTER — OUTPATIENT (OUTPATIENT)
Dept: OUTPATIENT SERVICES | Facility: HOSPITAL | Age: 67
LOS: 1 days | Discharge: HOME | End: 2022-01-20

## 2022-01-20 DIAGNOSIS — E89.0 POSTPROCEDURAL HYPOTHYROIDISM: Chronic | ICD-10-CM

## 2022-01-20 DIAGNOSIS — C16.9 MALIGNANT NEOPLASM OF STOMACH, UNSPECIFIED: ICD-10-CM

## 2022-01-20 DIAGNOSIS — Z98.890 OTHER SPECIFIED POSTPROCEDURAL STATES: Chronic | ICD-10-CM

## 2022-01-24 ENCOUNTER — RESULT REVIEW (OUTPATIENT)
Age: 67
End: 2022-01-24

## 2022-01-24 ENCOUNTER — APPOINTMENT (OUTPATIENT)
Dept: INFUSION THERAPY | Facility: HOSPITAL | Age: 67
End: 2022-01-24

## 2022-01-24 DIAGNOSIS — R11.2 NAUSEA WITH VOMITING, UNSPECIFIED: ICD-10-CM

## 2022-01-24 DIAGNOSIS — Z51.11 ENCOUNTER FOR ANTINEOPLASTIC CHEMOTHERAPY: ICD-10-CM

## 2022-01-24 LAB
ALBUMIN SERPL ELPH-MCNC: 4.1 G/DL — SIGNIFICANT CHANGE UP (ref 3.3–5)
ALP SERPL-CCNC: 88 U/L — SIGNIFICANT CHANGE UP (ref 40–120)
ALT FLD-CCNC: 23 U/L — SIGNIFICANT CHANGE UP (ref 10–45)
ANION GAP SERPL CALC-SCNC: 13 MMOL/L — SIGNIFICANT CHANGE UP (ref 5–17)
AST SERPL-CCNC: 22 U/L — SIGNIFICANT CHANGE UP (ref 10–40)
BASOPHILS # BLD AUTO: 0.06 K/UL — SIGNIFICANT CHANGE UP (ref 0–0.2)
BASOPHILS NFR BLD AUTO: 1 % — SIGNIFICANT CHANGE UP (ref 0–2)
BILIRUB SERPL-MCNC: 0.2 MG/DL — SIGNIFICANT CHANGE UP (ref 0.2–1.2)
BUN SERPL-MCNC: 20 MG/DL — SIGNIFICANT CHANGE UP (ref 7–23)
CALCIUM SERPL-MCNC: 9 MG/DL — SIGNIFICANT CHANGE UP (ref 8.4–10.5)
CEA SERPL-MCNC: 5.1 NG/ML — HIGH (ref 0–3.8)
CHLORIDE SERPL-SCNC: 105 MMOL/L — SIGNIFICANT CHANGE UP (ref 96–108)
CO2 SERPL-SCNC: 23 MMOL/L — SIGNIFICANT CHANGE UP (ref 22–31)
CREAT SERPL-MCNC: 0.88 MG/DL — SIGNIFICANT CHANGE UP (ref 0.5–1.3)
EOSINOPHIL # BLD AUTO: 0.32 K/UL — SIGNIFICANT CHANGE UP (ref 0–0.5)
EOSINOPHIL NFR BLD AUTO: 5.3 % — SIGNIFICANT CHANGE UP (ref 0–6)
GLUCOSE SERPL-MCNC: 154 MG/DL — HIGH (ref 70–99)
HCT VFR BLD CALC: 41.8 % — SIGNIFICANT CHANGE UP (ref 39–50)
HGB BLD-MCNC: 14.4 G/DL — SIGNIFICANT CHANGE UP (ref 13–17)
IMM GRANULOCYTES NFR BLD AUTO: 1 % — SIGNIFICANT CHANGE UP (ref 0–1.5)
LYMPHOCYTES # BLD AUTO: 1.98 K/UL — SIGNIFICANT CHANGE UP (ref 1–3.3)
LYMPHOCYTES # BLD AUTO: 32.5 % — SIGNIFICANT CHANGE UP (ref 13–44)
MCHC RBC-ENTMCNC: 28.8 PG — SIGNIFICANT CHANGE UP (ref 27–34)
MCHC RBC-ENTMCNC: 34.4 G/DL — SIGNIFICANT CHANGE UP (ref 32–36)
MCV RBC AUTO: 83.6 FL — SIGNIFICANT CHANGE UP (ref 80–100)
MONOCYTES # BLD AUTO: 0.85 K/UL — SIGNIFICANT CHANGE UP (ref 0–0.9)
MONOCYTES NFR BLD AUTO: 14 % — SIGNIFICANT CHANGE UP (ref 2–14)
NEUTROPHILS # BLD AUTO: 2.82 K/UL — SIGNIFICANT CHANGE UP (ref 1.8–7.4)
NEUTROPHILS NFR BLD AUTO: 46.2 % — SIGNIFICANT CHANGE UP (ref 43–77)
NRBC # BLD: 0 /100 WBCS — SIGNIFICANT CHANGE UP (ref 0–0)
PLATELET # BLD AUTO: 158 K/UL — SIGNIFICANT CHANGE UP (ref 150–400)
POTASSIUM SERPL-MCNC: 4 MMOL/L — SIGNIFICANT CHANGE UP (ref 3.5–5.3)
POTASSIUM SERPL-SCNC: 4 MMOL/L — SIGNIFICANT CHANGE UP (ref 3.5–5.3)
PROT SERPL-MCNC: 6.6 G/DL — SIGNIFICANT CHANGE UP (ref 6–8.3)
RBC # BLD: 5 M/UL — SIGNIFICANT CHANGE UP (ref 4.2–5.8)
RBC # FLD: 16.6 % — HIGH (ref 10.3–14.5)
SODIUM SERPL-SCNC: 141 MMOL/L — SIGNIFICANT CHANGE UP (ref 135–145)
T4 FREE+ TSH PNL SERPL: 2.53 UIU/ML — SIGNIFICANT CHANGE UP (ref 0.27–4.2)
WBC # BLD: 6.09 K/UL — SIGNIFICANT CHANGE UP (ref 3.8–10.5)
WBC # FLD AUTO: 6.09 K/UL — SIGNIFICANT CHANGE UP (ref 3.8–10.5)

## 2022-01-25 ENCOUNTER — NON-APPOINTMENT (OUTPATIENT)
Age: 67
End: 2022-01-25

## 2022-01-26 ENCOUNTER — APPOINTMENT (OUTPATIENT)
Dept: INFUSION THERAPY | Facility: HOSPITAL | Age: 67
End: 2022-01-26

## 2022-01-30 LAB
CORTICOSTEROID BINDING GLOBULIN RESULT: 1.7 MG/DL — SIGNIFICANT CHANGE UP
CORTIS F/TOTAL MFR SERPL: 9.3 % — SIGNIFICANT CHANGE UP
CORTIS SERPL-MCNC: 9 UG/DL — SIGNIFICANT CHANGE UP
CORTISOL, FREE RESULT: 0.84 UG/DL — SIGNIFICANT CHANGE UP

## 2022-02-07 ENCOUNTER — RESULT REVIEW (OUTPATIENT)
Age: 67
End: 2022-02-07

## 2022-02-07 ENCOUNTER — APPOINTMENT (OUTPATIENT)
Dept: INFUSION THERAPY | Facility: HOSPITAL | Age: 67
End: 2022-02-07

## 2022-02-07 ENCOUNTER — APPOINTMENT (OUTPATIENT)
Dept: HEMATOLOGY ONCOLOGY | Facility: CLINIC | Age: 67
End: 2022-02-07
Payer: MEDICARE

## 2022-02-07 VITALS
RESPIRATION RATE: 16 BRPM | SYSTOLIC BLOOD PRESSURE: 134 MMHG | BODY MASS INDEX: 33.84 KG/M2 | HEART RATE: 72 BPM | TEMPERATURE: 97.2 F | DIASTOLIC BLOOD PRESSURE: 81 MMHG | WEIGHT: 216.05 LBS | OXYGEN SATURATION: 96 %

## 2022-02-07 LAB
ALBUMIN SERPL ELPH-MCNC: 4.1 G/DL — SIGNIFICANT CHANGE UP (ref 3.3–5)
ALP SERPL-CCNC: 90 U/L — SIGNIFICANT CHANGE UP (ref 40–120)
ALT FLD-CCNC: 24 U/L — SIGNIFICANT CHANGE UP (ref 10–45)
ANION GAP SERPL CALC-SCNC: 15 MMOL/L — SIGNIFICANT CHANGE UP (ref 5–17)
AST SERPL-CCNC: 32 U/L — SIGNIFICANT CHANGE UP (ref 10–40)
BASOPHILS # BLD AUTO: 0.09 K/UL — SIGNIFICANT CHANGE UP (ref 0–0.2)
BASOPHILS NFR BLD AUTO: 1.4 % — SIGNIFICANT CHANGE UP (ref 0–2)
BILIRUB SERPL-MCNC: 0.3 MG/DL — SIGNIFICANT CHANGE UP (ref 0.2–1.2)
BUN SERPL-MCNC: 19 MG/DL — SIGNIFICANT CHANGE UP (ref 7–23)
CALCIUM SERPL-MCNC: 9.4 MG/DL — SIGNIFICANT CHANGE UP (ref 8.4–10.5)
CHLORIDE SERPL-SCNC: 106 MMOL/L — SIGNIFICANT CHANGE UP (ref 96–108)
CO2 SERPL-SCNC: 22 MMOL/L — SIGNIFICANT CHANGE UP (ref 22–31)
CREAT SERPL-MCNC: 0.86 MG/DL — SIGNIFICANT CHANGE UP (ref 0.5–1.3)
EOSINOPHIL # BLD AUTO: 0.39 K/UL — SIGNIFICANT CHANGE UP (ref 0–0.5)
EOSINOPHIL NFR BLD AUTO: 6.3 % — HIGH (ref 0–6)
GLUCOSE SERPL-MCNC: 177 MG/DL — HIGH (ref 70–99)
HCT VFR BLD CALC: 43.1 % — SIGNIFICANT CHANGE UP (ref 39–50)
HGB BLD-MCNC: 14.6 G/DL — SIGNIFICANT CHANGE UP (ref 13–17)
IMM GRANULOCYTES NFR BLD AUTO: 1 % — SIGNIFICANT CHANGE UP (ref 0–1.5)
LYMPHOCYTES # BLD AUTO: 1.69 K/UL — SIGNIFICANT CHANGE UP (ref 1–3.3)
LYMPHOCYTES # BLD AUTO: 27.1 % — SIGNIFICANT CHANGE UP (ref 13–44)
MCHC RBC-ENTMCNC: 28.9 PG — SIGNIFICANT CHANGE UP (ref 27–34)
MCHC RBC-ENTMCNC: 33.9 G/DL — SIGNIFICANT CHANGE UP (ref 32–36)
MCV RBC AUTO: 85.2 FL — SIGNIFICANT CHANGE UP (ref 80–100)
MONOCYTES # BLD AUTO: 0.79 K/UL — SIGNIFICANT CHANGE UP (ref 0–0.9)
MONOCYTES NFR BLD AUTO: 12.7 % — SIGNIFICANT CHANGE UP (ref 2–14)
NEUTROPHILS # BLD AUTO: 3.21 K/UL — SIGNIFICANT CHANGE UP (ref 1.8–7.4)
NEUTROPHILS NFR BLD AUTO: 51.5 % — SIGNIFICANT CHANGE UP (ref 43–77)
NRBC # BLD: 0 /100 WBCS — SIGNIFICANT CHANGE UP (ref 0–0)
PLATELET # BLD AUTO: 159 K/UL — SIGNIFICANT CHANGE UP (ref 150–400)
POTASSIUM SERPL-MCNC: 4.1 MMOL/L — SIGNIFICANT CHANGE UP (ref 3.5–5.3)
POTASSIUM SERPL-SCNC: 4.1 MMOL/L — SIGNIFICANT CHANGE UP (ref 3.5–5.3)
PROT SERPL-MCNC: 6.8 G/DL — SIGNIFICANT CHANGE UP (ref 6–8.3)
RBC # BLD: 5.06 M/UL — SIGNIFICANT CHANGE UP (ref 4.2–5.8)
RBC # FLD: 17.4 % — HIGH (ref 10.3–14.5)
SODIUM SERPL-SCNC: 143 MMOL/L — SIGNIFICANT CHANGE UP (ref 135–145)
WBC # BLD: 6.23 K/UL — SIGNIFICANT CHANGE UP (ref 3.8–10.5)
WBC # FLD AUTO: 6.23 K/UL — SIGNIFICANT CHANGE UP (ref 3.8–10.5)

## 2022-02-07 PROCEDURE — 99214 OFFICE O/P EST MOD 30 MIN: CPT

## 2022-02-07 RX ORDER — DEXAMETHASONE 4 MG/1
4 TABLET ORAL TWICE DAILY
Qty: 4 | Refills: 0 | Status: COMPLETED | COMMUNITY
Start: 2021-11-24 | End: 2022-02-07

## 2022-02-07 NOTE — PHYSICAL EXAM
[Fully active, able to carry on all pre-disease performance without restriction] : Status 0 - Fully active, able to carry on all pre-disease performance without restriction [Normal] : RRR, normal S1S2, no murmurs, rubs, gallops [de-identified] : anicteric  [de-identified] : no JVD [de-identified] : normal respiratory effort, no audible wheeze; no pain to palpation to right lateral chest wall; no crepitus [de-identified] : soft NT/ND

## 2022-02-07 NOTE — HISTORY OF PRESENT ILLNESS
[Disease: _____________________] : Disease: [unfilled] [AJCC Stage: ____] : AJCC Stage: [unfilled] [de-identified] : Patient is a 67 y/o M with known PMHx of hypothyroidism secondary to thyroidectomy from previous thyroid CA and NIDDM who first presented Dr. Cox c/o of epigastric pain made worse with eating since 5/2021 that patient thought was due to drinking too much coffee.  An EGD was performed on 10/19/21 that showed a large ulcerated mass in the gastric antrum/lesser curvature not involving the pylorus.  Biopsies were taken on the mass and were positive for poorly differentiated carcinoma favoring adenocarcinoma and H. pylori.  The patient was then sent for a CT A/P with contrast that showed a distal gastric mass with appearance of transmural extension along with adjacent perigastric lymphadenopathy and upper retroperitoneal lymphadenopathy.  The patient met with Dr. Maloney on 10/26/21 who ordered a CT Chest and a PET scan.  CT Chest on 10/27/21 showed no intrathoracic metastatic disease.  PET scan is scheduled for 11/2/21.  Plan is for ex-lap with Dr. Maloney with Port placement on 11/3/21.  The patient is here today to establish oncologic care.  The patient states that since starting triple therapy for H. pylori, his abdominal pain has improved.  Denies weight loss, anorexia, N/V, diarrhea, constipation, rectal bleeding or melena.\par \par \par FHx: father - liver cancer (diagnosed late stage in his 70's); paternal aunt - gastric cancer (diagnosed in her 50's, treated with resection)\par SHx: + tobacco 45 pack year history; + ETOH - wine with dinner [de-identified] : poorly differentiated carcinoma [de-identified] : CEA [de-identified] : her2-\par CPS >1\par MSI-High\par TMB - 21 Muts/Mb\par \par KIT R177H\par PTEN K267fs*9, T319fs*1\par APC B7790dt*29\par ASXL1 G645fs*58\par CIC T0842ej*91\par CTNNA1 N738fs*53\par EPHB4 amplification\par MLH1 loss\par PBRM1 S3560lc*102\par SMAD4 R361H\par TP53 R248W  [FreeTextEntry1] : FLOT started 11/11/21 x 1 --> FOLFOX +pembrolizumab [de-identified] : Patient presents for treatment today with his wife and reports overall he feels well.  He admits to neuropathy in his hands only when he is outside in the cold but not otherwise.  Denies pain or difficulty with ADL's.  He admits to some constipation in the first few days after treatment but denies rectal pain, rectal bleeding, abdominal pain or melena.  He admits to some new right sided lateral rib pain for the last one week that he notes only when he sits a certain way.  Denies CP, SOB, MCCALLUM, cough, pleuritic CP, flank pain, urinary symptoms.  He states that he had a fall striking that area a few months ago but denies any new trauma, fall or heavy lifting.  Denies pain with palpation to the area or with ROM of the arms.

## 2022-02-07 NOTE — REVIEW OF SYSTEMS
[Negative] : Heme/Lymph [Constipation] : constipation [Abdominal Pain] : no abdominal pain [Vomiting] : no vomiting [Diarrhea] : no diarrhea [FreeTextEntry5] : right side lateral rib pain

## 2022-02-09 ENCOUNTER — APPOINTMENT (OUTPATIENT)
Dept: INFUSION THERAPY | Facility: HOSPITAL | Age: 67
End: 2022-02-09

## 2022-02-11 ENCOUNTER — OUTPATIENT (OUTPATIENT)
Dept: OUTPATIENT SERVICES | Facility: HOSPITAL | Age: 67
LOS: 1 days | End: 2022-02-11
Payer: MEDICARE

## 2022-02-11 ENCOUNTER — APPOINTMENT (OUTPATIENT)
Dept: HEMATOLOGY ONCOLOGY | Facility: CLINIC | Age: 67
End: 2022-02-11
Payer: MEDICARE

## 2022-02-11 ENCOUNTER — APPOINTMENT (OUTPATIENT)
Dept: ULTRASOUND IMAGING | Facility: IMAGING CENTER | Age: 67
End: 2022-02-11
Payer: MEDICARE

## 2022-02-11 VITALS
WEIGHT: 211.64 LBS | HEART RATE: 71 BPM | OXYGEN SATURATION: 96 % | BODY MASS INDEX: 33.22 KG/M2 | RESPIRATION RATE: 16 BRPM | TEMPERATURE: 98.2 F | HEIGHT: 67.01 IN | SYSTOLIC BLOOD PRESSURE: 135 MMHG | DIASTOLIC BLOOD PRESSURE: 80 MMHG

## 2022-02-11 DIAGNOSIS — C16.9 MALIGNANT NEOPLASM OF STOMACH, UNSPECIFIED: ICD-10-CM

## 2022-02-11 DIAGNOSIS — Z98.890 OTHER SPECIFIED POSTPROCEDURAL STATES: Chronic | ICD-10-CM

## 2022-02-11 DIAGNOSIS — E89.0 POSTPROCEDURAL HYPOTHYROIDISM: Chronic | ICD-10-CM

## 2022-02-11 PROCEDURE — 93971 EXTREMITY STUDY: CPT | Mod: 26,RT

## 2022-02-11 PROCEDURE — 99214 OFFICE O/P EST MOD 30 MIN: CPT

## 2022-02-11 PROCEDURE — 93971 EXTREMITY STUDY: CPT

## 2022-02-13 NOTE — REVIEW OF SYSTEMS
[Constipation] : constipation [Negative] : Heme/Lymph [Dysphagia] : no dysphagia [Hoarseness] : no hoarseness [Odynophagia] : no odynophagia [Shortness Of Breath] : no shortness of breath [Wheezing] : no wheezing [Cough] : no cough [SOB on Exertion] : no shortness of breath during exertion [Abdominal Pain] : no abdominal pain [Vomiting] : no vomiting [Diarrhea] : no diarrhea [FreeTextEntry9] : right side neck pain just superior to port

## 2022-02-13 NOTE — REASON FOR VISIT
[Follow-Up Visit] : a follow-up [Spouse] : spouse [Urgent Visit] : an urgent  [FreeTextEntry2] : gastric cancer

## 2022-02-13 NOTE — HISTORY OF PRESENT ILLNESS
[Disease: _____________________] : Disease: [unfilled] [AJCC Stage: ____] : AJCC Stage: [unfilled] [de-identified] : Patient is a 65 y/o M with known PMHx of hypothyroidism secondary to thyroidectomy from previous thyroid CA and NIDDM who first presented Dr. Cox c/o of epigastric pain made worse with eating since 5/2021 that patient thought was due to drinking too much coffee.  An EGD was performed on 10/19/21 that showed a large ulcerated mass in the gastric antrum/lesser curvature not involving the pylorus.  Biopsies were taken on the mass and were positive for poorly differentiated carcinoma favoring adenocarcinoma and H. pylori.  The patient was then sent for a CT A/P with contrast that showed a distal gastric mass with appearance of transmural extension along with adjacent perigastric lymphadenopathy and upper retroperitoneal lymphadenopathy.  The patient met with Dr. Maloney on 10/26/21 who ordered a CT Chest and a PET scan.  CT Chest on 10/27/21 showed no intrathoracic metastatic disease.  PET scan is scheduled for 11/2/21.  Plan is for ex-lap with Dr. Maloney with Port placement on 11/3/21.  The patient is here today to establish oncologic care.  The patient states that since starting triple therapy for H. pylori, his abdominal pain has improved.  Denies weight loss, anorexia, N/V, diarrhea, constipation, rectal bleeding or melena.\par \par \par FHx: father - liver cancer (diagnosed late stage in his 70's); paternal aunt - gastric cancer (diagnosed in her 50's, treated with resection)\par SHx: + tobacco 45 pack year history; + ETOH - wine with dinner [de-identified] : poorly differentiated carcinoma [de-identified] : CEA [de-identified] : her2-\par CPS >1\par MSI-High\par TMB - 21 Muts/Mb\par \par KIT R177H\par PTEN K267fs*9, T319fs*1\par APC W8557na*29\par ASXL1 G645fs*58\par CIC V6950ub*91\par CTNNA1 N738fs*53\par EPHB4 amplification\par MLH1 loss\par PBRM1 W9422ii*102\par SMAD4 R361H\par TP53 R248W  [FreeTextEntry1] : FLOT started 11/11/21 x 1 --> FOLFOX +pembrolizumab [de-identified] : Patient presents as an acute visit today c/o right side neck and shoulder pain.  He states that he had it very mild when he was seen earlier this week prior to treatment but after his port was accessed and he received treatment, the pain became worse.  Patient has not been able to sleep for the last 2 night due to pain.  Denies fever, HA, dizziness, CP, SOB, MCCALLUM, pleuritic CP, cough, palpitations, N/V, abdominal pain.  Denies swelling, erythema, streaking, ecchymosis to neck or over port site.  Patient has been taking Tylenol and Motrin with some relief (10/10 pain to 4/10 pain).

## 2022-02-13 NOTE — PHYSICAL EXAM
[Fully active, able to carry on all pre-disease performance without restriction] : Status 0 - Fully active, able to carry on all pre-disease performance without restriction [Normal] : affect appropriate [de-identified] : anicteric  [de-identified] : no JVD; + right anterior neck tenderness over port catheter; no swelling, erythema, streaking, fluctuance or ecchymosis [de-identified] : normal respiratory effort, no audible wheeze; [de-identified] : soft NT/ND [de-identified] : no cervical adenopathy B/L

## 2022-02-16 ENCOUNTER — OUTPATIENT (OUTPATIENT)
Dept: OUTPATIENT SERVICES | Facility: HOSPITAL | Age: 67
LOS: 1 days | Discharge: HOME | End: 2022-02-16

## 2022-02-16 DIAGNOSIS — C16.9 MALIGNANT NEOPLASM OF STOMACH, UNSPECIFIED: ICD-10-CM

## 2022-02-16 DIAGNOSIS — E89.0 POSTPROCEDURAL HYPOTHYROIDISM: Chronic | ICD-10-CM

## 2022-02-16 DIAGNOSIS — Z98.890 OTHER SPECIFIED POSTPROCEDURAL STATES: Chronic | ICD-10-CM

## 2022-02-22 ENCOUNTER — RESULT REVIEW (OUTPATIENT)
Age: 67
End: 2022-02-22

## 2022-02-22 ENCOUNTER — APPOINTMENT (OUTPATIENT)
Dept: HEMATOLOGY ONCOLOGY | Facility: CLINIC | Age: 67
End: 2022-02-22
Payer: MEDICARE

## 2022-02-22 ENCOUNTER — APPOINTMENT (OUTPATIENT)
Dept: INFUSION THERAPY | Facility: HOSPITAL | Age: 67
End: 2022-02-22

## 2022-02-22 DIAGNOSIS — Z51.11 ENCOUNTER FOR ANTINEOPLASTIC CHEMOTHERAPY: ICD-10-CM

## 2022-02-22 DIAGNOSIS — R11.2 NAUSEA WITH VOMITING, UNSPECIFIED: ICD-10-CM

## 2022-02-22 LAB
ALBUMIN SERPL ELPH-MCNC: 4.4 G/DL — SIGNIFICANT CHANGE UP (ref 3.3–5)
ALP SERPL-CCNC: 106 U/L — SIGNIFICANT CHANGE UP (ref 40–120)
ALT FLD-CCNC: 34 U/L — SIGNIFICANT CHANGE UP (ref 10–45)
ANION GAP SERPL CALC-SCNC: 13 MMOL/L — SIGNIFICANT CHANGE UP (ref 5–17)
AST SERPL-CCNC: 32 U/L — SIGNIFICANT CHANGE UP (ref 10–40)
BASOPHILS # BLD AUTO: 0.06 K/UL — SIGNIFICANT CHANGE UP (ref 0–0.2)
BASOPHILS NFR BLD AUTO: 1 % — SIGNIFICANT CHANGE UP (ref 0–2)
BILIRUB SERPL-MCNC: 0.2 MG/DL — SIGNIFICANT CHANGE UP (ref 0.2–1.2)
BUN SERPL-MCNC: 19 MG/DL — SIGNIFICANT CHANGE UP (ref 7–23)
CALCIUM SERPL-MCNC: 9.3 MG/DL — SIGNIFICANT CHANGE UP (ref 8.4–10.5)
CHLORIDE SERPL-SCNC: 105 MMOL/L — SIGNIFICANT CHANGE UP (ref 96–108)
CO2 SERPL-SCNC: 23 MMOL/L — SIGNIFICANT CHANGE UP (ref 22–31)
CREAT SERPL-MCNC: 0.85 MG/DL — SIGNIFICANT CHANGE UP (ref 0.5–1.3)
EOSINOPHIL # BLD AUTO: 0.28 K/UL — SIGNIFICANT CHANGE UP (ref 0–0.5)
EOSINOPHIL NFR BLD AUTO: 4.8 % — SIGNIFICANT CHANGE UP (ref 0–6)
GLUCOSE SERPL-MCNC: 185 MG/DL — HIGH (ref 70–99)
HCT VFR BLD CALC: 42.4 % — SIGNIFICANT CHANGE UP (ref 39–50)
HGB BLD-MCNC: 14.6 G/DL — SIGNIFICANT CHANGE UP (ref 13–17)
IMM GRANULOCYTES NFR BLD AUTO: 0.7 % — SIGNIFICANT CHANGE UP (ref 0–1.5)
LYMPHOCYTES # BLD AUTO: 1.69 K/UL — SIGNIFICANT CHANGE UP (ref 1–3.3)
LYMPHOCYTES # BLD AUTO: 28.7 % — SIGNIFICANT CHANGE UP (ref 13–44)
MCHC RBC-ENTMCNC: 29.4 PG — SIGNIFICANT CHANGE UP (ref 27–34)
MCHC RBC-ENTMCNC: 34.4 G/DL — SIGNIFICANT CHANGE UP (ref 32–36)
MCV RBC AUTO: 85.3 FL — SIGNIFICANT CHANGE UP (ref 80–100)
MONOCYTES # BLD AUTO: 0.71 K/UL — SIGNIFICANT CHANGE UP (ref 0–0.9)
MONOCYTES NFR BLD AUTO: 12.1 % — SIGNIFICANT CHANGE UP (ref 2–14)
NEUTROPHILS # BLD AUTO: 3.1 K/UL — SIGNIFICANT CHANGE UP (ref 1.8–7.4)
NEUTROPHILS NFR BLD AUTO: 52.7 % — SIGNIFICANT CHANGE UP (ref 43–77)
NRBC # BLD: 0 /100 WBCS — SIGNIFICANT CHANGE UP (ref 0–0)
PLATELET # BLD AUTO: 171 K/UL — SIGNIFICANT CHANGE UP (ref 150–400)
POTASSIUM SERPL-MCNC: 4.2 MMOL/L — SIGNIFICANT CHANGE UP (ref 3.5–5.3)
POTASSIUM SERPL-SCNC: 4.2 MMOL/L — SIGNIFICANT CHANGE UP (ref 3.5–5.3)
PROT SERPL-MCNC: 7 G/DL — SIGNIFICANT CHANGE UP (ref 6–8.3)
RBC # BLD: 4.97 M/UL — SIGNIFICANT CHANGE UP (ref 4.2–5.8)
RBC # FLD: 17.4 % — HIGH (ref 10.3–14.5)
SODIUM SERPL-SCNC: 141 MMOL/L — SIGNIFICANT CHANGE UP (ref 135–145)
WBC # BLD: 5.88 K/UL — SIGNIFICANT CHANGE UP (ref 3.8–10.5)
WBC # FLD AUTO: 5.88 K/UL — SIGNIFICANT CHANGE UP (ref 3.8–10.5)

## 2022-02-22 PROCEDURE — 99214 OFFICE O/P EST MOD 30 MIN: CPT

## 2022-02-22 RX ORDER — OXYCODONE AND ACETAMINOPHEN 5; 325 MG/1; MG/1
5-325 TABLET ORAL
Qty: 5 | Refills: 0 | Status: COMPLETED | COMMUNITY
Start: 2022-02-11 | End: 2022-02-22

## 2022-02-24 ENCOUNTER — APPOINTMENT (OUTPATIENT)
Dept: INFUSION THERAPY | Facility: HOSPITAL | Age: 67
End: 2022-02-24

## 2022-02-25 NOTE — PHYSICAL EXAM
[Fully active, able to carry on all pre-disease performance without restriction] : Status 0 - Fully active, able to carry on all pre-disease performance without restriction [Normal] : affect appropriate [de-identified] : anicteric  [de-identified] : no JVD; no neck swelling [de-identified] : soft NT/ND [de-identified] : normal respiratory effort, no wheeze, rhonchi or rales; no increased respiratory effort/rate with lying flat or walking

## 2022-02-25 NOTE — HISTORY OF PRESENT ILLNESS
[Disease: _____________________] : Disease: [unfilled] [AJCC Stage: ____] : AJCC Stage: [unfilled] [de-identified] : Patient is a 67 y/o M with known PMHx of hypothyroidism secondary to thyroidectomy from previous thyroid CA and NIDDM who first presented Dr. Cox c/o of epigastric pain made worse with eating since 5/2021 that patient thought was due to drinking too much coffee.  An EGD was performed on 10/19/21 that showed a large ulcerated mass in the gastric antrum/lesser curvature not involving the pylorus.  Biopsies were taken on the mass and were positive for poorly differentiated carcinoma favoring adenocarcinoma and H. pylori.  The patient was then sent for a CT A/P with contrast that showed a distal gastric mass with appearance of transmural extension along with adjacent perigastric lymphadenopathy and upper retroperitoneal lymphadenopathy.  The patient met with Dr. Maloney on 10/26/21 who ordered a CT Chest and a PET scan.  CT Chest on 10/27/21 showed no intrathoracic metastatic disease.  PET scan is scheduled for 11/2/21.  Plan is for ex-lap with Dr. Maloney with Port placement on 11/3/21.  The patient is here today to establish oncologic care.  The patient states that since starting triple therapy for H. pylori, his abdominal pain has improved.  Denies weight loss, anorexia, N/V, diarrhea, constipation, rectal bleeding or melena.\par \par \par FHx: father - liver cancer (diagnosed late stage in his 70's); paternal aunt - gastric cancer (diagnosed in her 50's, treated with resection)\par SHx: + tobacco 45 pack year history; + ETOH - wine with dinner [de-identified] : poorly differentiated carcinoma [de-identified] : CEA [de-identified] : her2-\par CPS >1\par MSI-High\par TMB - 21 Muts/Mb\par \par KIT R177H\par PTEN K267fs*9, T319fs*1\par APC G0217zx*29\par ASXL1 G645fs*58\par CIC E9375lv*91\par CTNNA1 N738fs*53\par EPHB4 amplification\par MLH1 loss\par PBRM1 S4868mn*102\par SMAD4 R361H\par TP53 R248W  [de-identified] : Called to treatment room by RN today because patient reported SOB for the last 2-3 days.  He states that he notes the need to take deeper breaths when lying flat and with activity.  Denies fever, cough, URI sxs, CP, palpitations, dizziness, syncope, worsening fatigue.  Patient reports that previous neck pain from recently diagnosed IJ thrombus has resolved.  He has been compliant with Xarelto and is only taking Tylenol for mild discomfort rarely.  He reports some constipation that resolved with taking Miralax.  Denies abdominal pain, N/V, flank pain, anorexia, weight loss.  [FreeTextEntry1] : FLOT started 11/11/21 x 1 --> FOLFOX +pembrolizumab

## 2022-02-25 NOTE — REVIEW OF SYSTEMS
[Constipation] : constipation [Negative] : Heme/Lymph [Shortness Of Breath] : shortness of breath [SOB on Exertion] : shortness of breath during exertion [Dysphagia] : no dysphagia [Hoarseness] : no hoarseness [Odynophagia] : no odynophagia [Abdominal Pain] : no abdominal pain [Vomiting] : no vomiting [Diarrhea] : no diarrhea

## 2022-03-07 ENCOUNTER — RESULT REVIEW (OUTPATIENT)
Age: 67
End: 2022-03-07

## 2022-03-07 ENCOUNTER — APPOINTMENT (OUTPATIENT)
Dept: INFUSION THERAPY | Facility: HOSPITAL | Age: 67
End: 2022-03-07

## 2022-03-07 ENCOUNTER — APPOINTMENT (OUTPATIENT)
Dept: HEMATOLOGY ONCOLOGY | Facility: CLINIC | Age: 67
End: 2022-03-07
Payer: MEDICARE

## 2022-03-07 VITALS
BODY MASS INDEX: 33.49 KG/M2 | RESPIRATION RATE: 16 BRPM | HEART RATE: 80 BPM | OXYGEN SATURATION: 96 % | DIASTOLIC BLOOD PRESSURE: 78 MMHG | WEIGHT: 213.85 LBS | TEMPERATURE: 97 F | SYSTOLIC BLOOD PRESSURE: 133 MMHG

## 2022-03-07 DIAGNOSIS — R06.02 SHORTNESS OF BREATH: ICD-10-CM

## 2022-03-07 LAB
ALBUMIN SERPL ELPH-MCNC: 4.3 G/DL — SIGNIFICANT CHANGE UP (ref 3.3–5)
ALP SERPL-CCNC: 90 U/L — SIGNIFICANT CHANGE UP (ref 40–120)
ALT FLD-CCNC: 23 U/L — SIGNIFICANT CHANGE UP (ref 10–45)
ANION GAP SERPL CALC-SCNC: 15 MMOL/L — SIGNIFICANT CHANGE UP (ref 5–17)
AST SERPL-CCNC: 28 U/L — SIGNIFICANT CHANGE UP (ref 10–40)
BASOPHILS # BLD AUTO: 0.09 K/UL — SIGNIFICANT CHANGE UP (ref 0–0.2)
BASOPHILS NFR BLD AUTO: 1.3 % — SIGNIFICANT CHANGE UP (ref 0–2)
BILIRUB SERPL-MCNC: 0.2 MG/DL — SIGNIFICANT CHANGE UP (ref 0.2–1.2)
BUN SERPL-MCNC: 17 MG/DL — SIGNIFICANT CHANGE UP (ref 7–23)
CALCIUM SERPL-MCNC: 9.2 MG/DL — SIGNIFICANT CHANGE UP (ref 8.4–10.5)
CHLORIDE SERPL-SCNC: 107 MMOL/L — SIGNIFICANT CHANGE UP (ref 96–108)
CO2 SERPL-SCNC: 22 MMOL/L — SIGNIFICANT CHANGE UP (ref 22–31)
CREAT SERPL-MCNC: 0.86 MG/DL — SIGNIFICANT CHANGE UP (ref 0.5–1.3)
EGFR: 96 ML/MIN/1.73M2 — SIGNIFICANT CHANGE UP
EOSINOPHIL # BLD AUTO: 0.47 K/UL — SIGNIFICANT CHANGE UP (ref 0–0.5)
EOSINOPHIL NFR BLD AUTO: 6.9 % — HIGH (ref 0–6)
GLUCOSE SERPL-MCNC: 164 MG/DL — HIGH (ref 70–99)
HCT VFR BLD CALC: 42 % — SIGNIFICANT CHANGE UP (ref 39–50)
HGB BLD-MCNC: 14.3 G/DL — SIGNIFICANT CHANGE UP (ref 13–17)
IMM GRANULOCYTES NFR BLD AUTO: 0.9 % — SIGNIFICANT CHANGE UP (ref 0–1.5)
LYMPHOCYTES # BLD AUTO: 1.68 K/UL — SIGNIFICANT CHANGE UP (ref 1–3.3)
LYMPHOCYTES # BLD AUTO: 24.6 % — SIGNIFICANT CHANGE UP (ref 13–44)
MCHC RBC-ENTMCNC: 29.7 PG — SIGNIFICANT CHANGE UP (ref 27–34)
MCHC RBC-ENTMCNC: 34 G/DL — SIGNIFICANT CHANGE UP (ref 32–36)
MCV RBC AUTO: 87.3 FL — SIGNIFICANT CHANGE UP (ref 80–100)
MONOCYTES # BLD AUTO: 0.75 K/UL — SIGNIFICANT CHANGE UP (ref 0–0.9)
MONOCYTES NFR BLD AUTO: 11 % — SIGNIFICANT CHANGE UP (ref 2–14)
NEUTROPHILS # BLD AUTO: 3.78 K/UL — SIGNIFICANT CHANGE UP (ref 1.8–7.4)
NEUTROPHILS NFR BLD AUTO: 55.3 % — SIGNIFICANT CHANGE UP (ref 43–77)
NRBC # BLD: 0 /100 WBCS — SIGNIFICANT CHANGE UP (ref 0–0)
PLATELET # BLD AUTO: 202 K/UL — SIGNIFICANT CHANGE UP (ref 150–400)
POTASSIUM SERPL-MCNC: 4 MMOL/L — SIGNIFICANT CHANGE UP (ref 3.5–5.3)
POTASSIUM SERPL-SCNC: 4 MMOL/L — SIGNIFICANT CHANGE UP (ref 3.5–5.3)
PROT SERPL-MCNC: 6.7 G/DL — SIGNIFICANT CHANGE UP (ref 6–8.3)
RBC # BLD: 4.81 M/UL — SIGNIFICANT CHANGE UP (ref 4.2–5.8)
RBC # FLD: 17.3 % — HIGH (ref 10.3–14.5)
SODIUM SERPL-SCNC: 144 MMOL/L — SIGNIFICANT CHANGE UP (ref 135–145)
T4 FREE SERPL-MCNC: 1.5 NG/DL — SIGNIFICANT CHANGE UP (ref 0.9–1.8)
T4 FREE+ TSH PNL SERPL: 6.36 UIU/ML — HIGH (ref 0.27–4.2)
WBC # BLD: 6.83 K/UL — SIGNIFICANT CHANGE UP (ref 3.8–10.5)
WBC # FLD AUTO: 6.83 K/UL — SIGNIFICANT CHANGE UP (ref 3.8–10.5)

## 2022-03-07 PROCEDURE — 99214 OFFICE O/P EST MOD 30 MIN: CPT

## 2022-03-07 RX ORDER — RIVAROXABAN 15 MG-20MG
15 & 20 KIT ORAL
Qty: 1 | Refills: 0 | Status: DISCONTINUED | COMMUNITY
Start: 2022-02-11 | End: 2022-03-07

## 2022-03-08 ENCOUNTER — NON-APPOINTMENT (OUTPATIENT)
Age: 67
End: 2022-03-08

## 2022-03-08 PROBLEM — R06.02 SHORTNESS OF BREATH: Status: ACTIVE | Noted: 2022-02-22

## 2022-03-08 RX ORDER — OXYCODONE 5 MG/1
5 TABLET ORAL
Qty: 40 | Refills: 0 | Status: DISCONTINUED | COMMUNITY
Start: 2022-02-11

## 2022-03-08 NOTE — PHYSICAL EXAM
[Fully active, able to carry on all pre-disease performance without restriction] : Status 0 - Fully active, able to carry on all pre-disease performance without restriction [Normal] : affect appropriate [de-identified] : anicteric  [de-identified] : normal respiratory effort, no audible wheeze [de-identified] : no JVD; no neck swelling [de-identified] : reg rate  [de-identified] : non distended

## 2022-03-08 NOTE — REVIEW OF SYSTEMS
[SOB on Exertion] : shortness of breath during exertion [Constipation] : constipation [Negative] : Heme/Lymph [Dysphagia] : no dysphagia [Hoarseness] : no hoarseness [Odynophagia] : no odynophagia [Abdominal Pain] : no abdominal pain [Vomiting] : no vomiting [Diarrhea] : no diarrhea

## 2022-03-08 NOTE — HISTORY OF PRESENT ILLNESS
[Disease: _____________________] : Disease: [unfilled] [AJCC Stage: ____] : AJCC Stage: [unfilled] [de-identified] : Patient is a 67 y/o M with known PMHx of hypothyroidism secondary to thyroidectomy from previous thyroid CA and NIDDM who first presented Dr. Cox c/o of epigastric pain made worse with eating since 5/2021 that patient thought was due to drinking too much coffee.  An EGD was performed on 10/19/21 that showed a large ulcerated mass in the gastric antrum/lesser curvature not involving the pylorus.  Biopsies were taken on the mass and were positive for poorly differentiated carcinoma favoring adenocarcinoma and H. pylori.  The patient was then sent for a CT A/P with contrast that showed a distal gastric mass with appearance of transmural extension along with adjacent perigastric lymphadenopathy and upper retroperitoneal lymphadenopathy.  The patient met with Dr. Maloney on 10/26/21 who ordered a CT Chest and a PET scan.  CT Chest on 10/27/21 showed no intrathoracic metastatic disease.  PET scan is scheduled for 11/2/21.  Plan is for ex-lap with Dr. Maloney with Port placement on 11/3/21.  The patient is here today to establish oncologic care.  The patient states that since starting triple therapy for H. pylori, his abdominal pain has improved.  Denies weight loss, anorexia, N/V, diarrhea, constipation, rectal bleeding or melena.\par  \par MS-H --> FOLFOX Pembro starting Dec 2021 [de-identified] : poorly differentiated carcinoma [de-identified] : CEA [de-identified] : her2-\par CPS >1\par MSI-High\par TMB - 21 Muts/Mb\par \par KIT R177H\par PTEN K267fs*9, T319fs*1\par APC M7102ug*29\par ASXL1 G645fs*58\par CIC C8406vc*91\par CTNNA1 N738fs*53\par EPHB4 amplification\par MLH1 loss\par PBRM1 M7864sh*102\par SMAD4 R361H\par TP53 R248W  [FreeTextEntry1] : FLOT started 11/11/21 x 1 --> FOLFOX +pembrolizumab [de-identified] : Presents in follow-up.  Denies any abdominal pain fevers or chills.\par Notes stable dyspnea on exertion however denies any chest pain, pleuritic pain or fevers.  Some minimal cough.  Of note patient states he has been more sedentary over the last couple of months so is unsure if this is contributing to it.  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

## 2022-03-09 ENCOUNTER — APPOINTMENT (OUTPATIENT)
Dept: INFUSION THERAPY | Facility: HOSPITAL | Age: 67
End: 2022-03-09

## 2022-03-18 ENCOUNTER — RESULT REVIEW (OUTPATIENT)
Age: 67
End: 2022-03-18

## 2022-03-18 ENCOUNTER — APPOINTMENT (OUTPATIENT)
Dept: HEMATOLOGY ONCOLOGY | Facility: CLINIC | Age: 67
End: 2022-03-18

## 2022-03-18 LAB
BASOPHILS # BLD AUTO: 0.06 K/UL — SIGNIFICANT CHANGE UP (ref 0–0.2)
BASOPHILS NFR BLD AUTO: 0.9 % — SIGNIFICANT CHANGE UP (ref 0–2)
EOSINOPHIL # BLD AUTO: 0.41 K/UL — SIGNIFICANT CHANGE UP (ref 0–0.5)
EOSINOPHIL NFR BLD AUTO: 6.1 % — HIGH (ref 0–6)
HCT VFR BLD CALC: 43 % — SIGNIFICANT CHANGE UP (ref 39–50)
HGB BLD-MCNC: 14.7 G/DL — SIGNIFICANT CHANGE UP (ref 13–17)
IMM GRANULOCYTES NFR BLD AUTO: 0.6 % — SIGNIFICANT CHANGE UP (ref 0–1.5)
LYMPHOCYTES # BLD AUTO: 2.34 K/UL — SIGNIFICANT CHANGE UP (ref 1–3.3)
LYMPHOCYTES # BLD AUTO: 34.9 % — SIGNIFICANT CHANGE UP (ref 13–44)
MCHC RBC-ENTMCNC: 30.1 PG — SIGNIFICANT CHANGE UP (ref 27–34)
MCHC RBC-ENTMCNC: 34.2 G/DL — SIGNIFICANT CHANGE UP (ref 32–36)
MCV RBC AUTO: 88.1 FL — SIGNIFICANT CHANGE UP (ref 80–100)
MONOCYTES # BLD AUTO: 0.87 K/UL — SIGNIFICANT CHANGE UP (ref 0–0.9)
MONOCYTES NFR BLD AUTO: 13 % — SIGNIFICANT CHANGE UP (ref 2–14)
NEUTROPHILS # BLD AUTO: 2.98 K/UL — SIGNIFICANT CHANGE UP (ref 1.8–7.4)
NEUTROPHILS NFR BLD AUTO: 44.5 % — SIGNIFICANT CHANGE UP (ref 43–77)
NRBC # BLD: 0 /100 WBCS — SIGNIFICANT CHANGE UP (ref 0–0)
PLATELET # BLD AUTO: 236 K/UL — SIGNIFICANT CHANGE UP (ref 150–400)
RBC # BLD: 4.88 M/UL — SIGNIFICANT CHANGE UP (ref 4.2–5.8)
RBC # FLD: 17.2 % — HIGH (ref 10.3–14.5)
WBC # BLD: 6.7 K/UL — SIGNIFICANT CHANGE UP (ref 3.8–10.5)
WBC # FLD AUTO: 6.7 K/UL — SIGNIFICANT CHANGE UP (ref 3.8–10.5)

## 2022-03-20 ENCOUNTER — OUTPATIENT (OUTPATIENT)
Dept: OUTPATIENT SERVICES | Facility: HOSPITAL | Age: 67
LOS: 1 days | Discharge: HOME | End: 2022-03-20

## 2022-03-20 DIAGNOSIS — Z98.890 OTHER SPECIFIED POSTPROCEDURAL STATES: Chronic | ICD-10-CM

## 2022-03-20 DIAGNOSIS — C16.9 MALIGNANT NEOPLASM OF STOMACH, UNSPECIFIED: ICD-10-CM

## 2022-03-20 DIAGNOSIS — E89.0 POSTPROCEDURAL HYPOTHYROIDISM: Chronic | ICD-10-CM

## 2022-03-21 ENCOUNTER — APPOINTMENT (OUTPATIENT)
Dept: INFUSION THERAPY | Facility: HOSPITAL | Age: 67
End: 2022-03-21

## 2022-03-21 ENCOUNTER — RESULT REVIEW (OUTPATIENT)
Age: 67
End: 2022-03-21

## 2022-03-21 DIAGNOSIS — R11.2 NAUSEA WITH VOMITING, UNSPECIFIED: ICD-10-CM

## 2022-03-21 DIAGNOSIS — Z51.11 ENCOUNTER FOR ANTINEOPLASTIC CHEMOTHERAPY: ICD-10-CM

## 2022-03-21 LAB
ALBUMIN SERPL ELPH-MCNC: 4.2 G/DL — SIGNIFICANT CHANGE UP (ref 3.3–5)
ALP SERPL-CCNC: 101 U/L — SIGNIFICANT CHANGE UP (ref 40–120)
ALT FLD-CCNC: 17 U/L — SIGNIFICANT CHANGE UP (ref 10–45)
ANION GAP SERPL CALC-SCNC: 13 MMOL/L — SIGNIFICANT CHANGE UP (ref 5–17)
AST SERPL-CCNC: 18 U/L — SIGNIFICANT CHANGE UP (ref 10–40)
BILIRUB SERPL-MCNC: 0.2 MG/DL — SIGNIFICANT CHANGE UP (ref 0.2–1.2)
BUN SERPL-MCNC: 17 MG/DL — SIGNIFICANT CHANGE UP (ref 7–23)
CALCIUM SERPL-MCNC: 9 MG/DL — SIGNIFICANT CHANGE UP (ref 8.4–10.5)
CHLORIDE SERPL-SCNC: 105 MMOL/L — SIGNIFICANT CHANGE UP (ref 96–108)
CO2 SERPL-SCNC: 24 MMOL/L — SIGNIFICANT CHANGE UP (ref 22–31)
CREAT SERPL-MCNC: 0.87 MG/DL — SIGNIFICANT CHANGE UP (ref 0.5–1.3)
EGFR: 95 ML/MIN/1.73M2 — SIGNIFICANT CHANGE UP
GLUCOSE SERPL-MCNC: 156 MG/DL — HIGH (ref 70–99)
POTASSIUM SERPL-MCNC: 4 MMOL/L — SIGNIFICANT CHANGE UP (ref 3.5–5.3)
POTASSIUM SERPL-SCNC: 4 MMOL/L — SIGNIFICANT CHANGE UP (ref 3.5–5.3)
PROT SERPL-MCNC: 6.6 G/DL — SIGNIFICANT CHANGE UP (ref 6–8.3)
SODIUM SERPL-SCNC: 142 MMOL/L — SIGNIFICANT CHANGE UP (ref 135–145)
T4 FREE+ TSH PNL SERPL: 2.75 UIU/ML — SIGNIFICANT CHANGE UP (ref 0.27–4.2)

## 2022-03-23 ENCOUNTER — APPOINTMENT (OUTPATIENT)
Dept: INFUSION THERAPY | Facility: HOSPITAL | Age: 67
End: 2022-03-23

## 2022-03-28 ENCOUNTER — OUTPATIENT (OUTPATIENT)
Dept: OUTPATIENT SERVICES | Facility: HOSPITAL | Age: 67
LOS: 1 days | End: 2022-03-28
Payer: MEDICARE

## 2022-03-28 ENCOUNTER — APPOINTMENT (OUTPATIENT)
Dept: NUCLEAR MEDICINE | Facility: IMAGING CENTER | Age: 67
End: 2022-03-28
Payer: MEDICARE

## 2022-03-28 DIAGNOSIS — C16.9 MALIGNANT NEOPLASM OF STOMACH, UNSPECIFIED: ICD-10-CM

## 2022-03-28 DIAGNOSIS — E89.0 POSTPROCEDURAL HYPOTHYROIDISM: Chronic | ICD-10-CM

## 2022-03-28 DIAGNOSIS — Z98.890 OTHER SPECIFIED POSTPROCEDURAL STATES: Chronic | ICD-10-CM

## 2022-03-28 PROCEDURE — 78815 PET IMAGE W/CT SKULL-THIGH: CPT | Mod: 26,PS,MH

## 2022-03-28 PROCEDURE — A9552: CPT

## 2022-03-28 PROCEDURE — 78815 PET IMAGE W/CT SKULL-THIGH: CPT

## 2022-04-01 ENCOUNTER — RESULT REVIEW (OUTPATIENT)
Age: 67
End: 2022-04-01

## 2022-04-01 ENCOUNTER — APPOINTMENT (OUTPATIENT)
Dept: HEMATOLOGY ONCOLOGY | Facility: CLINIC | Age: 67
End: 2022-04-01
Payer: MEDICARE

## 2022-04-01 VITALS
RESPIRATION RATE: 16 BRPM | BODY MASS INDEX: 32.78 KG/M2 | OXYGEN SATURATION: 98 % | DIASTOLIC BLOOD PRESSURE: 79 MMHG | SYSTOLIC BLOOD PRESSURE: 120 MMHG | WEIGHT: 209.31 LBS | TEMPERATURE: 97 F | HEART RATE: 68 BPM

## 2022-04-01 LAB
BASOPHILS # BLD AUTO: 0.06 K/UL — SIGNIFICANT CHANGE UP (ref 0–0.2)
BASOPHILS NFR BLD AUTO: 0.8 % — SIGNIFICANT CHANGE UP (ref 0–2)
EOSINOPHIL # BLD AUTO: 0.38 K/UL — SIGNIFICANT CHANGE UP (ref 0–0.5)
EOSINOPHIL NFR BLD AUTO: 5.3 % — SIGNIFICANT CHANGE UP (ref 0–6)
HCT VFR BLD CALC: 43.8 % — SIGNIFICANT CHANGE UP (ref 39–50)
HGB BLD-MCNC: 14.8 G/DL — SIGNIFICANT CHANGE UP (ref 13–17)
IMM GRANULOCYTES NFR BLD AUTO: 0.3 % — SIGNIFICANT CHANGE UP (ref 0–1.5)
LYMPHOCYTES # BLD AUTO: 2.14 K/UL — SIGNIFICANT CHANGE UP (ref 1–3.3)
LYMPHOCYTES # BLD AUTO: 30.1 % — SIGNIFICANT CHANGE UP (ref 13–44)
MCHC RBC-ENTMCNC: 30.1 PG — SIGNIFICANT CHANGE UP (ref 27–34)
MCHC RBC-ENTMCNC: 33.8 G/DL — SIGNIFICANT CHANGE UP (ref 32–36)
MCV RBC AUTO: 89.2 FL — SIGNIFICANT CHANGE UP (ref 80–100)
MONOCYTES # BLD AUTO: 0.98 K/UL — HIGH (ref 0–0.9)
MONOCYTES NFR BLD AUTO: 13.8 % — SIGNIFICANT CHANGE UP (ref 2–14)
NEUTROPHILS # BLD AUTO: 3.54 K/UL — SIGNIFICANT CHANGE UP (ref 1.8–7.4)
NEUTROPHILS NFR BLD AUTO: 49.7 % — SIGNIFICANT CHANGE UP (ref 43–77)
NRBC # BLD: 0 /100 WBCS — SIGNIFICANT CHANGE UP (ref 0–0)
PLATELET # BLD AUTO: 242 K/UL — SIGNIFICANT CHANGE UP (ref 150–400)
RBC # BLD: 4.91 M/UL — SIGNIFICANT CHANGE UP (ref 4.2–5.8)
RBC # FLD: 16.6 % — HIGH (ref 10.3–14.5)
WBC # BLD: 7.12 K/UL — SIGNIFICANT CHANGE UP (ref 3.8–10.5)
WBC # FLD AUTO: 7.12 K/UL — SIGNIFICANT CHANGE UP (ref 3.8–10.5)

## 2022-04-01 PROCEDURE — 99214 OFFICE O/P EST MOD 30 MIN: CPT

## 2022-04-02 LAB
ALBUMIN SERPL ELPH-MCNC: 4.9 G/DL
ALP BLD-CCNC: 95 U/L
ALT SERPL-CCNC: 22 U/L
ANION GAP SERPL CALC-SCNC: 13 MMOL/L
AST SERPL-CCNC: 23 U/L
BILIRUB SERPL-MCNC: 0.3 MG/DL
BUN SERPL-MCNC: 18 MG/DL
CALCIUM SERPL-MCNC: 9.7 MG/DL
CHLORIDE SERPL-SCNC: 105 MMOL/L
CO2 SERPL-SCNC: 26 MMOL/L
CREAT SERPL-MCNC: 0.9 MG/DL
EGFR: 94 ML/MIN/1.73M2
GLUCOSE SERPL-MCNC: 101 MG/DL
POTASSIUM SERPL-SCNC: 4.8 MMOL/L
PROT SERPL-MCNC: 7.4 G/DL
SODIUM SERPL-SCNC: 145 MMOL/L

## 2022-04-02 NOTE — PHYSICAL EXAM
[Fully active, able to carry on all pre-disease performance without restriction] : Status 0 - Fully active, able to carry on all pre-disease performance without restriction [Normal] : affect appropriate [de-identified] : anicteric  [de-identified] : no JVD; no neck swelling [de-identified] : reg rate  [de-identified] : normal respiratory effort, no audible wheeze [de-identified] : non distended

## 2022-04-02 NOTE — HISTORY OF PRESENT ILLNESS
[Disease: _____________________] : Disease: [unfilled] [AJCC Stage: ____] : AJCC Stage: [unfilled] [de-identified] : Patient is a 65 y/o M with known PMHx of hypothyroidism secondary to thyroidectomy from previous thyroid CA and NIDDM who first presented Dr. Cox c/o of epigastric pain made worse with eating since 5/2021 that patient thought was due to drinking too much coffee.  An EGD was performed on 10/19/21 that showed a large ulcerated mass in the gastric antrum/lesser curvature not involving the pylorus.  Biopsies were taken on the mass and were positive for poorly differentiated carcinoma favoring adenocarcinoma and H. pylori.  The patient was then sent for a CT A/P with contrast that showed a distal gastric mass with appearance of transmural extension along with adjacent perigastric lymphadenopathy and upper retroperitoneal lymphadenopathy.  The patient met with Dr. Maloney on 10/26/21 who ordered a CT Chest and a PET scan.  CT Chest on 10/27/21 showed no intrathoracic metastatic disease.  PET scan is scheduled for 11/2/21.  Plan is for ex-lap with Dr. Maloney with Port placement on 11/3/21.  The patient is here today to establish oncologic care.  The patient states that since starting triple therapy for H. pylori, his abdominal pain has improved.  Denies weight loss, anorexia, N/V, diarrhea, constipation, rectal bleeding or melena.\par  Stage IV disease \par MS-H --> FOLFOX Pembro starting Dec 2021 [de-identified] : CEA [de-identified] : poorly differentiated carcinoma [de-identified] : her2-\par CPS >1\par MSI-High\par TMB - 21 Muts/Mb\par \par KIT R177H\par PTEN K267fs*9, T319fs*1\par APC V6119no*29\par ASXL1 G645fs*58\par CIC Y5388qb*91\par CTNNA1 N738fs*53\par EPHB4 amplification\par MLH1 loss\par PBRM1 B0579ha*102\par SMAD4 R361H\par TP53 R248W  [FreeTextEntry1] : FLOT started 11/11/21 x 1 --> FOLFOX +pembrolizumab--> 5FU Pembro [de-identified] : Presents in follow-up.  Denies any abdominal pain fevers or chills.\par seen by Dr. Bee for mucus and blood in stool and started on suppository and another med (unsure of the name) and since then improved \par no abdominal pain, fevers or chills\par no diarrhea\par no rash \par neuropathy improving

## 2022-04-02 NOTE — REVIEW OF SYSTEMS
[Negative] : Respiratory [Dysphagia] : no dysphagia [Hoarseness] : no hoarseness [Odynophagia] : no odynophagia [Abdominal Pain] : no abdominal pain [Vomiting] : no vomiting [Diarrhea] : no diarrhea [FreeTextEntry7] : occasional blood or mucus in stool

## 2022-04-04 ENCOUNTER — APPOINTMENT (OUTPATIENT)
Dept: INFUSION THERAPY | Facility: HOSPITAL | Age: 67
End: 2022-04-04

## 2022-04-04 ENCOUNTER — RESULT REVIEW (OUTPATIENT)
Age: 67
End: 2022-04-04

## 2022-04-04 ENCOUNTER — APPOINTMENT (OUTPATIENT)
Dept: ENDOCRINOLOGY | Facility: CLINIC | Age: 67
End: 2022-04-04
Payer: MEDICARE

## 2022-04-04 VITALS
HEART RATE: 75 BPM | TEMPERATURE: 98.1 F | BODY MASS INDEX: 32.96 KG/M2 | HEIGHT: 67 IN | WEIGHT: 210 LBS | OXYGEN SATURATION: 95 % | SYSTOLIC BLOOD PRESSURE: 117 MMHG | DIASTOLIC BLOOD PRESSURE: 76 MMHG

## 2022-04-04 LAB
ALBUMIN SERPL ELPH-MCNC: 4.4 G/DL — SIGNIFICANT CHANGE UP (ref 3.3–5)
ALP SERPL-CCNC: 86 U/L — SIGNIFICANT CHANGE UP (ref 40–120)
ALT FLD-CCNC: 19 U/L — SIGNIFICANT CHANGE UP (ref 10–45)
ANION GAP SERPL CALC-SCNC: 14 MMOL/L — SIGNIFICANT CHANGE UP (ref 5–17)
AST SERPL-CCNC: 20 U/L — SIGNIFICANT CHANGE UP (ref 10–40)
BILIRUB SERPL-MCNC: 0.3 MG/DL — SIGNIFICANT CHANGE UP (ref 0.2–1.2)
BUN SERPL-MCNC: 18 MG/DL — SIGNIFICANT CHANGE UP (ref 7–23)
CALCIUM SERPL-MCNC: 9.3 MG/DL — SIGNIFICANT CHANGE UP (ref 8.4–10.5)
CHLORIDE SERPL-SCNC: 103 MMOL/L — SIGNIFICANT CHANGE UP (ref 96–108)
CO2 SERPL-SCNC: 24 MMOL/L — SIGNIFICANT CHANGE UP (ref 22–31)
CREAT SERPL-MCNC: 0.83 MG/DL — SIGNIFICANT CHANGE UP (ref 0.5–1.3)
EGFR: 97 ML/MIN/1.73M2 — SIGNIFICANT CHANGE UP
GLUCOSE SERPL-MCNC: 204 MG/DL — HIGH (ref 70–99)
HBA1C MFR BLD HPLC: 6.9
POTASSIUM SERPL-MCNC: 4.1 MMOL/L — SIGNIFICANT CHANGE UP (ref 3.5–5.3)
POTASSIUM SERPL-SCNC: 4.1 MMOL/L — SIGNIFICANT CHANGE UP (ref 3.5–5.3)
PROT SERPL-MCNC: 6.9 G/DL — SIGNIFICANT CHANGE UP (ref 6–8.3)
SODIUM SERPL-SCNC: 141 MMOL/L — SIGNIFICANT CHANGE UP (ref 135–145)

## 2022-04-04 PROCEDURE — 99214 OFFICE O/P EST MOD 30 MIN: CPT | Mod: 25

## 2022-04-04 PROCEDURE — 83036 HEMOGLOBIN GLYCOSYLATED A1C: CPT | Mod: QW

## 2022-04-04 RX ORDER — PANTOPRAZOLE 40 MG/1
40 TABLET, DELAYED RELEASE ORAL
Qty: 90 | Refills: 0 | Status: DISCONTINUED | COMMUNITY
Start: 2021-10-19 | End: 2022-04-04

## 2022-04-04 RX ORDER — SUCRALFATE 1 G/1
1 TABLET ORAL
Refills: 0 | Status: DISCONTINUED | COMMUNITY
Start: 2021-10-19 | End: 2022-04-04

## 2022-04-04 NOTE — HISTORY OF PRESENT ILLNESS
[FreeTextEntry1] : 66 y.o. male with h/o hypothyroidism s/p surgery for papillary thyroid cancer and Type 2 DM here for follow up visit. Patient saw GI, Dr. Bee with c/o epigastric pain made with worse when eating. Had EGD on 10/19/21 showing a large ulcerated mass in the gastric antrum/lesser curvature not involving the pylorus. Biopsies were taken on the mass and were positive for poorly differentiated carcinoma favoring adenocarcinoma and H. pylori. Had exploratory lap and port placement on 11/3/21. PET CT scan on 11/3/21 showed distal gastric wall thickening and FDG avid abdominal lymphadenopathy compatible with metastatic disease.  Now seeing oncology and being treated with FOLFOX Pembro started in December 2021 and now on 5FU Pembro. Also h/o DVT and on Xarelto now. \par \par In regards to Type 2 DM, no polyuria and no polydipsia. Not monitoring FS at home. Taking Metformin ER 1,000 mg daily.  UTD with ophthalmology (2022) and no retinopathy. Had foot pain and using inserts. Does have podiatrist. Taking ACE-I for proteinuria. For breakfast, no more bagels and has rye with egg whites and coffee with no sugar. For lunch: rye with salami. For dinner: Greek foods. No sodas or juices. Likes fruits at night with occasional sweets. Still smoking and drinks wine. Does lots of walking. \par \par In regards to thyroid disease, taking Synthroid 175 mcg daily. No neck complaints. Had unremarkable neck ultrasound on 12/4/19. Had total thyroidectomy and  I131 in 1999. No SOB or palpitations. \par \par In regards to vitamin D def, off MVI as per oncology

## 2022-04-04 NOTE — REVIEW OF SYSTEMS
[Constipation] : constipation [Abdominal Pain] : abdominal pain [Pain/Numbness of Digits] : pain/numbness of digits [Negative] : Respiratory [All other systems negative] : All other systems negative [Recent Weight Gain (___ Lbs)] : no recent weight gain [Recent Weight Loss (___ Lbs)] : no recent weight loss [Polydipsia] : no polydipsia [Swelling] : no swelling

## 2022-04-04 NOTE — ASSESSMENT
[Carbohydrate Consistent Diet] : carbohydrate consistent diet [Diabetes Foot Care] : diabetes foot care [Long Term Vascular Complications] : long term vascular complications of diabetes [Smoking Cessation] : smoking cessation [FreeTextEntry1] : 66 y.o. male with h/o Type 2 DM, hypothyroidism s/p surgery and I131 for papillary thyroid cancer. \par \par 1. Type 2 DM- Good control with Hba1c of 6.9% today. Encouraged a carbohydrate consistent diet and exercise as tolerated. Needs to limit fruit intake. Will continue Metformin ER 1,000 mg daily. Recommend SMBG. Reviewed blood glucose targets. Stable urine alb/cr ratio in 12/2021.\par \par 2. BP is at goal and will continue Lisinopril for renal protection\par \par 3. Hyperlipidemia- LDL is above goal and discussed statin for CV risk reduction. Patient declines statin at this time.\par \par 4. Hypothyroidism/papillary thyroid cancer- Clinically stable. Goal TSH is below 2.5. Will adjust Synthroid accordingly. UTD with neck ultrasound in December 2019 was unremarkable and will repeat ultrasound in 5 years. PET CT scan in 12/2021 did not show any pathology in the head or neck region. Discussed concerns of immune checkpoint inhibitors and endocrinopathies. Will need close monitoring of TFTs. \par \par 5. Vitamin D def- Will monitor for now\par \par If stable, follow up in 6 months \par Follow up with podiatry and recommend treatment of tinea pedia

## 2022-04-06 ENCOUNTER — APPOINTMENT (OUTPATIENT)
Dept: INFUSION THERAPY | Facility: HOSPITAL | Age: 67
End: 2022-04-06

## 2022-04-16 ENCOUNTER — OUTPATIENT (OUTPATIENT)
Dept: OUTPATIENT SERVICES | Facility: HOSPITAL | Age: 67
LOS: 1 days | Discharge: HOME | End: 2022-04-16

## 2022-04-16 DIAGNOSIS — Z98.890 OTHER SPECIFIED POSTPROCEDURAL STATES: Chronic | ICD-10-CM

## 2022-04-16 DIAGNOSIS — E89.0 POSTPROCEDURAL HYPOTHYROIDISM: Chronic | ICD-10-CM

## 2022-04-16 DIAGNOSIS — C16.9 MALIGNANT NEOPLASM OF STOMACH, UNSPECIFIED: ICD-10-CM

## 2022-04-18 ENCOUNTER — APPOINTMENT (OUTPATIENT)
Dept: HEMATOLOGY ONCOLOGY | Facility: CLINIC | Age: 67
End: 2022-04-18

## 2022-04-18 ENCOUNTER — RESULT REVIEW (OUTPATIENT)
Age: 67
End: 2022-04-18

## 2022-04-18 ENCOUNTER — APPOINTMENT (OUTPATIENT)
Dept: INFUSION THERAPY | Facility: HOSPITAL | Age: 67
End: 2022-04-18

## 2022-04-18 LAB
ALBUMIN SERPL ELPH-MCNC: 4.4 G/DL — SIGNIFICANT CHANGE UP (ref 3.3–5)
ALP SERPL-CCNC: 97 U/L — SIGNIFICANT CHANGE UP (ref 40–120)
ALT FLD-CCNC: 20 U/L — SIGNIFICANT CHANGE UP (ref 10–45)
ANION GAP SERPL CALC-SCNC: 14 MMOL/L — SIGNIFICANT CHANGE UP (ref 5–17)
AST SERPL-CCNC: 22 U/L — SIGNIFICANT CHANGE UP (ref 10–40)
BASOPHILS # BLD AUTO: 0.07 K/UL — SIGNIFICANT CHANGE UP (ref 0–0.2)
BASOPHILS NFR BLD AUTO: 1.4 % — SIGNIFICANT CHANGE UP (ref 0–2)
BILIRUB SERPL-MCNC: 0.3 MG/DL — SIGNIFICANT CHANGE UP (ref 0.2–1.2)
BUN SERPL-MCNC: 18 MG/DL — SIGNIFICANT CHANGE UP (ref 7–23)
CALCIUM SERPL-MCNC: 8.8 MG/DL — SIGNIFICANT CHANGE UP (ref 8.4–10.5)
CHLORIDE SERPL-SCNC: 105 MMOL/L — SIGNIFICANT CHANGE UP (ref 96–108)
CO2 SERPL-SCNC: 24 MMOL/L — SIGNIFICANT CHANGE UP (ref 22–31)
CREAT SERPL-MCNC: 0.87 MG/DL — SIGNIFICANT CHANGE UP (ref 0.5–1.3)
EGFR: 95 ML/MIN/1.73M2 — SIGNIFICANT CHANGE UP
EOSINOPHIL # BLD AUTO: 0.29 K/UL — SIGNIFICANT CHANGE UP (ref 0–0.5)
EOSINOPHIL NFR BLD AUTO: 5.6 % — SIGNIFICANT CHANGE UP (ref 0–6)
GLUCOSE SERPL-MCNC: 185 MG/DL — HIGH (ref 70–99)
HCT VFR BLD CALC: 40.7 % — SIGNIFICANT CHANGE UP (ref 39–50)
HGB BLD-MCNC: 13.8 G/DL — SIGNIFICANT CHANGE UP (ref 13–17)
IMM GRANULOCYTES NFR BLD AUTO: 0.4 % — SIGNIFICANT CHANGE UP (ref 0–1.5)
LYMPHOCYTES # BLD AUTO: 1.29 K/UL — SIGNIFICANT CHANGE UP (ref 1–3.3)
LYMPHOCYTES # BLD AUTO: 25 % — SIGNIFICANT CHANGE UP (ref 13–44)
MCHC RBC-ENTMCNC: 30.3 PG — SIGNIFICANT CHANGE UP (ref 27–34)
MCHC RBC-ENTMCNC: 33.9 G/DL — SIGNIFICANT CHANGE UP (ref 32–36)
MCV RBC AUTO: 89.3 FL — SIGNIFICANT CHANGE UP (ref 80–100)
MONOCYTES # BLD AUTO: 0.53 K/UL — SIGNIFICANT CHANGE UP (ref 0–0.9)
MONOCYTES NFR BLD AUTO: 10.3 % — SIGNIFICANT CHANGE UP (ref 2–14)
NEUTROPHILS # BLD AUTO: 2.95 K/UL — SIGNIFICANT CHANGE UP (ref 1.8–7.4)
NEUTROPHILS NFR BLD AUTO: 57.3 % — SIGNIFICANT CHANGE UP (ref 43–77)
NRBC # BLD: 0 /100 WBCS — SIGNIFICANT CHANGE UP (ref 0–0)
PLATELET # BLD AUTO: 204 K/UL — SIGNIFICANT CHANGE UP (ref 150–400)
POTASSIUM SERPL-MCNC: 4.2 MMOL/L — SIGNIFICANT CHANGE UP (ref 3.5–5.3)
POTASSIUM SERPL-SCNC: 4.2 MMOL/L — SIGNIFICANT CHANGE UP (ref 3.5–5.3)
PROT SERPL-MCNC: 6.7 G/DL — SIGNIFICANT CHANGE UP (ref 6–8.3)
RBC # BLD: 4.56 M/UL — SIGNIFICANT CHANGE UP (ref 4.2–5.8)
RBC # FLD: 15.4 % — HIGH (ref 10.3–14.5)
SODIUM SERPL-SCNC: 143 MMOL/L — SIGNIFICANT CHANGE UP (ref 135–145)
T4 FREE+ TSH PNL SERPL: 0.44 UIU/ML — SIGNIFICANT CHANGE UP (ref 0.27–4.2)
WBC # BLD: 5.15 K/UL — SIGNIFICANT CHANGE UP (ref 3.8–10.5)
WBC # FLD AUTO: 5.15 K/UL — SIGNIFICANT CHANGE UP (ref 3.8–10.5)

## 2022-04-19 ENCOUNTER — NON-APPOINTMENT (OUTPATIENT)
Age: 67
End: 2022-04-19

## 2022-04-19 DIAGNOSIS — R11.2 NAUSEA WITH VOMITING, UNSPECIFIED: ICD-10-CM

## 2022-04-19 DIAGNOSIS — Z51.11 ENCOUNTER FOR ANTINEOPLASTIC CHEMOTHERAPY: ICD-10-CM

## 2022-04-20 ENCOUNTER — APPOINTMENT (OUTPATIENT)
Dept: INFUSION THERAPY | Facility: HOSPITAL | Age: 67
End: 2022-04-20

## 2022-05-03 ENCOUNTER — RESULT REVIEW (OUTPATIENT)
Age: 67
End: 2022-05-03

## 2022-05-03 ENCOUNTER — APPOINTMENT (OUTPATIENT)
Dept: INFUSION THERAPY | Facility: HOSPITAL | Age: 67
End: 2022-05-03

## 2022-05-03 LAB
ALBUMIN SERPL ELPH-MCNC: 4.5 G/DL — SIGNIFICANT CHANGE UP (ref 3.3–5)
ALP SERPL-CCNC: 118 U/L — SIGNIFICANT CHANGE UP (ref 40–120)
ALT FLD-CCNC: 18 U/L — SIGNIFICANT CHANGE UP (ref 10–45)
ANION GAP SERPL CALC-SCNC: 14 MMOL/L — SIGNIFICANT CHANGE UP (ref 5–17)
AST SERPL-CCNC: 21 U/L — SIGNIFICANT CHANGE UP (ref 10–40)
BASOPHILS # BLD AUTO: 0.06 K/UL — SIGNIFICANT CHANGE UP (ref 0–0.2)
BASOPHILS NFR BLD AUTO: 0.9 % — SIGNIFICANT CHANGE UP (ref 0–2)
BILIRUB SERPL-MCNC: 0.3 MG/DL — SIGNIFICANT CHANGE UP (ref 0.2–1.2)
BUN SERPL-MCNC: 21 MG/DL — SIGNIFICANT CHANGE UP (ref 7–23)
CALCIUM SERPL-MCNC: 9.1 MG/DL — SIGNIFICANT CHANGE UP (ref 8.4–10.5)
CHLORIDE SERPL-SCNC: 103 MMOL/L — SIGNIFICANT CHANGE UP (ref 96–108)
CO2 SERPL-SCNC: 25 MMOL/L — SIGNIFICANT CHANGE UP (ref 22–31)
CREAT SERPL-MCNC: 0.87 MG/DL — SIGNIFICANT CHANGE UP (ref 0.5–1.3)
EGFR: 95 ML/MIN/1.73M2 — SIGNIFICANT CHANGE UP
EOSINOPHIL # BLD AUTO: 0.27 K/UL — SIGNIFICANT CHANGE UP (ref 0–0.5)
EOSINOPHIL NFR BLD AUTO: 4 % — SIGNIFICANT CHANGE UP (ref 0–6)
GLUCOSE SERPL-MCNC: 199 MG/DL — HIGH (ref 70–99)
HCT VFR BLD CALC: 42 % — SIGNIFICANT CHANGE UP (ref 39–50)
HGB BLD-MCNC: 14.5 G/DL — SIGNIFICANT CHANGE UP (ref 13–17)
IMM GRANULOCYTES NFR BLD AUTO: 0.6 % — SIGNIFICANT CHANGE UP (ref 0–1.5)
LYMPHOCYTES # BLD AUTO: 1.76 K/UL — SIGNIFICANT CHANGE UP (ref 1–3.3)
LYMPHOCYTES # BLD AUTO: 26 % — SIGNIFICANT CHANGE UP (ref 13–44)
MCHC RBC-ENTMCNC: 30.8 PG — SIGNIFICANT CHANGE UP (ref 27–34)
MCHC RBC-ENTMCNC: 34.5 G/DL — SIGNIFICANT CHANGE UP (ref 32–36)
MCV RBC AUTO: 89.2 FL — SIGNIFICANT CHANGE UP (ref 80–100)
MONOCYTES # BLD AUTO: 0.74 K/UL — SIGNIFICANT CHANGE UP (ref 0–0.9)
MONOCYTES NFR BLD AUTO: 10.9 % — SIGNIFICANT CHANGE UP (ref 2–14)
NEUTROPHILS # BLD AUTO: 3.89 K/UL — SIGNIFICANT CHANGE UP (ref 1.8–7.4)
NEUTROPHILS NFR BLD AUTO: 57.6 % — SIGNIFICANT CHANGE UP (ref 43–77)
NRBC # BLD: 0 /100 WBCS — SIGNIFICANT CHANGE UP (ref 0–0)
PLATELET # BLD AUTO: 231 K/UL — SIGNIFICANT CHANGE UP (ref 150–400)
POTASSIUM SERPL-MCNC: 4.2 MMOL/L — SIGNIFICANT CHANGE UP (ref 3.5–5.3)
POTASSIUM SERPL-SCNC: 4.2 MMOL/L — SIGNIFICANT CHANGE UP (ref 3.5–5.3)
PROT SERPL-MCNC: 6.9 G/DL — SIGNIFICANT CHANGE UP (ref 6–8.3)
RBC # BLD: 4.71 M/UL — SIGNIFICANT CHANGE UP (ref 4.2–5.8)
RBC # FLD: 14.7 % — HIGH (ref 10.3–14.5)
SODIUM SERPL-SCNC: 142 MMOL/L — SIGNIFICANT CHANGE UP (ref 135–145)
WBC # BLD: 6.76 K/UL — SIGNIFICANT CHANGE UP (ref 3.8–10.5)
WBC # FLD AUTO: 6.76 K/UL — SIGNIFICANT CHANGE UP (ref 3.8–10.5)

## 2022-05-04 ENCOUNTER — APPOINTMENT (OUTPATIENT)
Dept: HEMATOLOGY ONCOLOGY | Facility: CLINIC | Age: 67
End: 2022-05-04
Payer: MEDICARE

## 2022-05-04 VITALS
OXYGEN SATURATION: 95 % | SYSTOLIC BLOOD PRESSURE: 137 MMHG | HEART RATE: 75 BPM | TEMPERATURE: 97 F | DIASTOLIC BLOOD PRESSURE: 88 MMHG | BODY MASS INDEX: 33.67 KG/M2 | RESPIRATION RATE: 16 BRPM | WEIGHT: 214.99 LBS

## 2022-05-04 PROCEDURE — 99214 OFFICE O/P EST MOD 30 MIN: CPT

## 2022-05-04 NOTE — PHYSICAL EXAM
[Fully active, able to carry on all pre-disease performance without restriction] : Status 0 - Fully active, able to carry on all pre-disease performance without restriction [Normal] : affect appropriate [de-identified] : anicteric  [de-identified] : no JVD; no neck swelling [de-identified] : normal respiratory effort, no audible wheeze [de-identified] : reg rate  [de-identified] : non distended

## 2022-05-04 NOTE — HISTORY OF PRESENT ILLNESS
[Disease: _____________________] : Disease: [unfilled] [AJCC Stage: ____] : AJCC Stage: [unfilled] [de-identified] : Patient is a 67 y/o M with known PMHx of hypothyroidism secondary to thyroidectomy from previous thyroid CA and NIDDM who first presented Dr. Cox c/o of epigastric pain made worse with eating since 5/2021 that patient thought was due to drinking too much coffee.  An EGD was performed on 10/19/21 that showed a large ulcerated mass in the gastric antrum/lesser curvature not involving the pylorus.  Biopsies were taken on the mass and were positive for poorly differentiated carcinoma favoring adenocarcinoma and H. pylori.  The patient was then sent for a CT A/P with contrast that showed a distal gastric mass with appearance of transmural extension along with adjacent perigastric lymphadenopathy and upper retroperitoneal lymphadenopathy.  The patient met with Dr. Maloney on 10/26/21 who ordered a CT Chest and a PET scan.  CT Chest on 10/27/21 showed no intrathoracic metastatic disease.  PET scan is scheduled for 11/2/21.  Plan is for ex-lap with Dr. Maloney with Port placement on 11/3/21.  The patient is here today to establish oncologic care.  The patient states that since starting triple therapy for H. pylori, his abdominal pain has improved.  Denies weight loss, anorexia, N/V, diarrhea, constipation, rectal bleeding or melena.\par  Stage IV disease \par MS-H --> FOLFOX Pembro starting Dec 2021 [de-identified] : poorly differentiated carcinoma [de-identified] : CEA [de-identified] : her2-\par CPS >1\par MSI-High\par TMB - 21 Muts/Mb\par \par KIT R177H\par PTEN K267fs*9, T319fs*1\par APC K6083jo*29\par ASXL1 G645fs*58\par CIC Y5009fr*91\par CTNNA1 N738fs*53\par EPHB4 amplification\par MLH1 loss\par PBRM1 B3264qs*102\par SMAD4 R361H\par TP53 R248W  [FreeTextEntry1] : FLOT started 11/11/21 x 1 --> FOLFOX +pembrolizumab--> 5FU Pembro [de-identified] : Patient presents for follow up while on treatment and reports that he is feeling well.  He admits to rare episodes of SOB with sitting in a certain position, but otherwise has no complaints.  Denies anorexia, weight loss, abdominal pain, N/V, diarrhea.  He reports episodes of BRBPR on the paper only in the first couple of days after treatment that resolves with rectal suppository prescribed by his GI.  He is planning on traveling to [facility] for his son's wedding in July.

## 2022-05-04 NOTE — REVIEW OF SYSTEMS
[Negative] : Heme/Lymph [Dysphagia] : no dysphagia [Hoarseness] : no hoarseness [Odynophagia] : no odynophagia [Abdominal Pain] : no abdominal pain [Vomiting] : no vomiting [Diarrhea] : no diarrhea [FreeTextEntry7] : occasional blood or mucus in stool

## 2022-05-05 ENCOUNTER — APPOINTMENT (OUTPATIENT)
Dept: INFUSION THERAPY | Facility: HOSPITAL | Age: 67
End: 2022-05-05

## 2022-05-14 ENCOUNTER — OUTPATIENT (OUTPATIENT)
Dept: OUTPATIENT SERVICES | Facility: HOSPITAL | Age: 67
LOS: 1 days | Discharge: HOME | End: 2022-05-14

## 2022-05-14 DIAGNOSIS — C16.9 MALIGNANT NEOPLASM OF STOMACH, UNSPECIFIED: ICD-10-CM

## 2022-05-14 DIAGNOSIS — Z98.890 OTHER SPECIFIED POSTPROCEDURAL STATES: Chronic | ICD-10-CM

## 2022-05-14 DIAGNOSIS — E89.0 POSTPROCEDURAL HYPOTHYROIDISM: Chronic | ICD-10-CM

## 2022-05-16 ENCOUNTER — RESULT REVIEW (OUTPATIENT)
Age: 67
End: 2022-05-16

## 2022-05-16 ENCOUNTER — NON-APPOINTMENT (OUTPATIENT)
Age: 67
End: 2022-05-16

## 2022-05-16 ENCOUNTER — APPOINTMENT (OUTPATIENT)
Dept: INFUSION THERAPY | Facility: HOSPITAL | Age: 67
End: 2022-05-16

## 2022-05-16 LAB
ALBUMIN SERPL ELPH-MCNC: 4.6 G/DL — SIGNIFICANT CHANGE UP (ref 3.3–5)
ALP SERPL-CCNC: 122 U/L — HIGH (ref 40–120)
ALT FLD-CCNC: 17 U/L — SIGNIFICANT CHANGE UP (ref 10–45)
ANION GAP SERPL CALC-SCNC: 14 MMOL/L — SIGNIFICANT CHANGE UP (ref 5–17)
AST SERPL-CCNC: 19 U/L — SIGNIFICANT CHANGE UP (ref 10–40)
BASOPHILS # BLD AUTO: 0.07 K/UL — SIGNIFICANT CHANGE UP (ref 0–0.2)
BASOPHILS NFR BLD AUTO: 1 % — SIGNIFICANT CHANGE UP (ref 0–2)
BILIRUB SERPL-MCNC: 0.3 MG/DL — SIGNIFICANT CHANGE UP (ref 0.2–1.2)
BUN SERPL-MCNC: 20 MG/DL — SIGNIFICANT CHANGE UP (ref 7–23)
CALCIUM SERPL-MCNC: 9.1 MG/DL — SIGNIFICANT CHANGE UP (ref 8.4–10.5)
CHLORIDE SERPL-SCNC: 103 MMOL/L — SIGNIFICANT CHANGE UP (ref 96–108)
CO2 SERPL-SCNC: 25 MMOL/L — SIGNIFICANT CHANGE UP (ref 22–31)
CREAT SERPL-MCNC: 1.03 MG/DL — SIGNIFICANT CHANGE UP (ref 0.5–1.3)
EGFR: 80 ML/MIN/1.73M2 — SIGNIFICANT CHANGE UP
EOSINOPHIL # BLD AUTO: 0.31 K/UL — SIGNIFICANT CHANGE UP (ref 0–0.5)
EOSINOPHIL NFR BLD AUTO: 4.4 % — SIGNIFICANT CHANGE UP (ref 0–6)
GLUCOSE SERPL-MCNC: 156 MG/DL — HIGH (ref 70–99)
HCT VFR BLD CALC: 42.9 % — SIGNIFICANT CHANGE UP (ref 39–50)
HGB BLD-MCNC: 14.5 G/DL — SIGNIFICANT CHANGE UP (ref 13–17)
IMM GRANULOCYTES NFR BLD AUTO: 0.7 % — SIGNIFICANT CHANGE UP (ref 0–1.5)
LYMPHOCYTES # BLD AUTO: 1.88 K/UL — SIGNIFICANT CHANGE UP (ref 1–3.3)
LYMPHOCYTES # BLD AUTO: 27 % — SIGNIFICANT CHANGE UP (ref 13–44)
MCHC RBC-ENTMCNC: 30.5 PG — SIGNIFICANT CHANGE UP (ref 27–34)
MCHC RBC-ENTMCNC: 33.8 G/DL — SIGNIFICANT CHANGE UP (ref 32–36)
MCV RBC AUTO: 90.1 FL — SIGNIFICANT CHANGE UP (ref 80–100)
MONOCYTES # BLD AUTO: 0.91 K/UL — HIGH (ref 0–0.9)
MONOCYTES NFR BLD AUTO: 13.1 % — SIGNIFICANT CHANGE UP (ref 2–14)
NEUTROPHILS # BLD AUTO: 3.75 K/UL — SIGNIFICANT CHANGE UP (ref 1.8–7.4)
NEUTROPHILS NFR BLD AUTO: 53.8 % — SIGNIFICANT CHANGE UP (ref 43–77)
NRBC # BLD: 0 /100 WBCS — SIGNIFICANT CHANGE UP (ref 0–0)
PLATELET # BLD AUTO: 224 K/UL — SIGNIFICANT CHANGE UP (ref 150–400)
POTASSIUM SERPL-MCNC: 4.2 MMOL/L — SIGNIFICANT CHANGE UP (ref 3.5–5.3)
POTASSIUM SERPL-SCNC: 4.2 MMOL/L — SIGNIFICANT CHANGE UP (ref 3.5–5.3)
PROT SERPL-MCNC: 6.8 G/DL — SIGNIFICANT CHANGE UP (ref 6–8.3)
RBC # BLD: 4.76 M/UL — SIGNIFICANT CHANGE UP (ref 4.2–5.8)
RBC # FLD: 14.3 % — SIGNIFICANT CHANGE UP (ref 10.3–14.5)
SODIUM SERPL-SCNC: 142 MMOL/L — SIGNIFICANT CHANGE UP (ref 135–145)
WBC # BLD: 6.97 K/UL — SIGNIFICANT CHANGE UP (ref 3.8–10.5)
WBC # FLD AUTO: 6.97 K/UL — SIGNIFICANT CHANGE UP (ref 3.8–10.5)

## 2022-05-17 DIAGNOSIS — R11.2 NAUSEA WITH VOMITING, UNSPECIFIED: ICD-10-CM

## 2022-05-17 DIAGNOSIS — Z51.11 ENCOUNTER FOR ANTINEOPLASTIC CHEMOTHERAPY: ICD-10-CM

## 2022-05-18 ENCOUNTER — APPOINTMENT (OUTPATIENT)
Dept: INFUSION THERAPY | Facility: HOSPITAL | Age: 67
End: 2022-05-18

## 2022-05-31 ENCOUNTER — NON-APPOINTMENT (OUTPATIENT)
Age: 67
End: 2022-05-31

## 2022-05-31 ENCOUNTER — RESULT REVIEW (OUTPATIENT)
Age: 67
End: 2022-05-31

## 2022-05-31 ENCOUNTER — APPOINTMENT (OUTPATIENT)
Dept: INFUSION THERAPY | Facility: HOSPITAL | Age: 67
End: 2022-05-31

## 2022-05-31 LAB
ALBUMIN SERPL ELPH-MCNC: 4.8 G/DL — SIGNIFICANT CHANGE UP (ref 3.3–5)
ALP SERPL-CCNC: 112 U/L — SIGNIFICANT CHANGE UP (ref 40–120)
ALT FLD-CCNC: 18 U/L — SIGNIFICANT CHANGE UP (ref 10–45)
ANION GAP SERPL CALC-SCNC: 12 MMOL/L — SIGNIFICANT CHANGE UP (ref 5–17)
AST SERPL-CCNC: 21 U/L — SIGNIFICANT CHANGE UP (ref 10–40)
BASOPHILS # BLD AUTO: 0.07 K/UL — SIGNIFICANT CHANGE UP (ref 0–0.2)
BASOPHILS NFR BLD AUTO: 1 % — SIGNIFICANT CHANGE UP (ref 0–2)
BILIRUB SERPL-MCNC: 0.3 MG/DL — SIGNIFICANT CHANGE UP (ref 0.2–1.2)
BUN SERPL-MCNC: 17 MG/DL — SIGNIFICANT CHANGE UP (ref 7–23)
CALCIUM SERPL-MCNC: 9.5 MG/DL — SIGNIFICANT CHANGE UP (ref 8.4–10.5)
CHLORIDE SERPL-SCNC: 104 MMOL/L — SIGNIFICANT CHANGE UP (ref 96–108)
CO2 SERPL-SCNC: 25 MMOL/L — SIGNIFICANT CHANGE UP (ref 22–31)
CREAT SERPL-MCNC: 0.92 MG/DL — SIGNIFICANT CHANGE UP (ref 0.5–1.3)
EGFR: 92 ML/MIN/1.73M2 — SIGNIFICANT CHANGE UP
EOSINOPHIL # BLD AUTO: 0.31 K/UL — SIGNIFICANT CHANGE UP (ref 0–0.5)
EOSINOPHIL NFR BLD AUTO: 4.6 % — SIGNIFICANT CHANGE UP (ref 0–6)
GLUCOSE SERPL-MCNC: 149 MG/DL — HIGH (ref 70–99)
HCT VFR BLD CALC: 44.7 % — SIGNIFICANT CHANGE UP (ref 39–50)
HGB BLD-MCNC: 15.1 G/DL — SIGNIFICANT CHANGE UP (ref 13–17)
IMM GRANULOCYTES NFR BLD AUTO: 0.9 % — SIGNIFICANT CHANGE UP (ref 0–1.5)
LYMPHOCYTES # BLD AUTO: 1.51 K/UL — SIGNIFICANT CHANGE UP (ref 1–3.3)
LYMPHOCYTES # BLD AUTO: 22.5 % — SIGNIFICANT CHANGE UP (ref 13–44)
MCHC RBC-ENTMCNC: 30.8 PG — SIGNIFICANT CHANGE UP (ref 27–34)
MCHC RBC-ENTMCNC: 33.8 G/DL — SIGNIFICANT CHANGE UP (ref 32–36)
MCV RBC AUTO: 91 FL — SIGNIFICANT CHANGE UP (ref 80–100)
MONOCYTES # BLD AUTO: 0.75 K/UL — SIGNIFICANT CHANGE UP (ref 0–0.9)
MONOCYTES NFR BLD AUTO: 11.2 % — SIGNIFICANT CHANGE UP (ref 2–14)
NEUTROPHILS # BLD AUTO: 4.02 K/UL — SIGNIFICANT CHANGE UP (ref 1.8–7.4)
NEUTROPHILS NFR BLD AUTO: 59.8 % — SIGNIFICANT CHANGE UP (ref 43–77)
NRBC # BLD: 0 /100 WBCS — SIGNIFICANT CHANGE UP (ref 0–0)
PLATELET # BLD AUTO: 230 K/UL — SIGNIFICANT CHANGE UP (ref 150–400)
POTASSIUM SERPL-MCNC: 4.5 MMOL/L — SIGNIFICANT CHANGE UP (ref 3.5–5.3)
POTASSIUM SERPL-SCNC: 4.5 MMOL/L — SIGNIFICANT CHANGE UP (ref 3.5–5.3)
PROT SERPL-MCNC: 6.9 G/DL — SIGNIFICANT CHANGE UP (ref 6–8.3)
RBC # BLD: 4.91 M/UL — SIGNIFICANT CHANGE UP (ref 4.2–5.8)
RBC # FLD: 13.9 % — SIGNIFICANT CHANGE UP (ref 10.3–14.5)
SODIUM SERPL-SCNC: 141 MMOL/L — SIGNIFICANT CHANGE UP (ref 135–145)
T4 FREE SERPL-MCNC: 1.8 NG/DL — SIGNIFICANT CHANGE UP (ref 0.9–1.8)
T4 FREE+ TSH PNL SERPL: 0.03 UIU/ML — LOW (ref 0.27–4.2)
WBC # BLD: 6.72 K/UL — SIGNIFICANT CHANGE UP (ref 3.8–10.5)
WBC # FLD AUTO: 6.72 K/UL — SIGNIFICANT CHANGE UP (ref 3.8–10.5)

## 2022-06-02 ENCOUNTER — APPOINTMENT (OUTPATIENT)
Dept: INFUSION THERAPY | Facility: HOSPITAL | Age: 67
End: 2022-06-02

## 2022-06-13 ENCOUNTER — RESULT REVIEW (OUTPATIENT)
Age: 67
End: 2022-06-13

## 2022-06-13 ENCOUNTER — APPOINTMENT (OUTPATIENT)
Dept: HEMATOLOGY ONCOLOGY | Facility: CLINIC | Age: 67
End: 2022-06-13
Payer: MEDICARE

## 2022-06-13 ENCOUNTER — APPOINTMENT (OUTPATIENT)
Dept: INFUSION THERAPY | Facility: HOSPITAL | Age: 67
End: 2022-06-13

## 2022-06-13 VITALS
OXYGEN SATURATION: 94 % | HEART RATE: 79 BPM | TEMPERATURE: 97.4 F | WEIGHT: 218.26 LBS | DIASTOLIC BLOOD PRESSURE: 79 MMHG | RESPIRATION RATE: 16 BRPM | BODY MASS INDEX: 34.26 KG/M2 | SYSTOLIC BLOOD PRESSURE: 128 MMHG | HEIGHT: 67.01 IN

## 2022-06-13 LAB
ALBUMIN SERPL ELPH-MCNC: 4.6 G/DL — SIGNIFICANT CHANGE UP (ref 3.3–5)
ALP SERPL-CCNC: 106 U/L — SIGNIFICANT CHANGE UP (ref 40–120)
ALT FLD-CCNC: 18 U/L — SIGNIFICANT CHANGE UP (ref 10–45)
ANION GAP SERPL CALC-SCNC: 14 MMOL/L — SIGNIFICANT CHANGE UP (ref 5–17)
AST SERPL-CCNC: 18 U/L — SIGNIFICANT CHANGE UP (ref 10–40)
BASOPHILS # BLD AUTO: 0.07 K/UL — SIGNIFICANT CHANGE UP (ref 0–0.2)
BASOPHILS NFR BLD AUTO: 1.1 % — SIGNIFICANT CHANGE UP (ref 0–2)
BILIRUB SERPL-MCNC: 0.3 MG/DL — SIGNIFICANT CHANGE UP (ref 0.2–1.2)
BUN SERPL-MCNC: 20 MG/DL — SIGNIFICANT CHANGE UP (ref 7–23)
CALCIUM SERPL-MCNC: 9.1 MG/DL — SIGNIFICANT CHANGE UP (ref 8.4–10.5)
CHLORIDE SERPL-SCNC: 104 MMOL/L — SIGNIFICANT CHANGE UP (ref 96–108)
CO2 SERPL-SCNC: 24 MMOL/L — SIGNIFICANT CHANGE UP (ref 22–31)
CREAT SERPL-MCNC: 0.94 MG/DL — SIGNIFICANT CHANGE UP (ref 0.5–1.3)
EGFR: 89 ML/MIN/1.73M2 — SIGNIFICANT CHANGE UP
EOSINOPHIL # BLD AUTO: 0.42 K/UL — SIGNIFICANT CHANGE UP (ref 0–0.5)
EOSINOPHIL NFR BLD AUTO: 6.6 % — HIGH (ref 0–6)
GLUCOSE SERPL-MCNC: 189 MG/DL — HIGH (ref 70–99)
HCT VFR BLD CALC: 42.6 % — SIGNIFICANT CHANGE UP (ref 39–50)
HGB BLD-MCNC: 14.5 G/DL — SIGNIFICANT CHANGE UP (ref 13–17)
IMM GRANULOCYTES NFR BLD AUTO: 0.3 % — SIGNIFICANT CHANGE UP (ref 0–1.5)
LYMPHOCYTES # BLD AUTO: 1.7 K/UL — SIGNIFICANT CHANGE UP (ref 1–3.3)
LYMPHOCYTES # BLD AUTO: 26.9 % — SIGNIFICANT CHANGE UP (ref 13–44)
MCHC RBC-ENTMCNC: 30.6 PG — SIGNIFICANT CHANGE UP (ref 27–34)
MCHC RBC-ENTMCNC: 34 G/DL — SIGNIFICANT CHANGE UP (ref 32–36)
MCV RBC AUTO: 89.9 FL — SIGNIFICANT CHANGE UP (ref 80–100)
MONOCYTES # BLD AUTO: 0.81 K/UL — SIGNIFICANT CHANGE UP (ref 0–0.9)
MONOCYTES NFR BLD AUTO: 12.8 % — SIGNIFICANT CHANGE UP (ref 2–14)
NEUTROPHILS # BLD AUTO: 3.31 K/UL — SIGNIFICANT CHANGE UP (ref 1.8–7.4)
NEUTROPHILS NFR BLD AUTO: 52.3 % — SIGNIFICANT CHANGE UP (ref 43–77)
NRBC # BLD: 0 /100 WBCS — SIGNIFICANT CHANGE UP (ref 0–0)
PLATELET # BLD AUTO: 230 K/UL — SIGNIFICANT CHANGE UP (ref 150–400)
POTASSIUM SERPL-MCNC: 4.5 MMOL/L — SIGNIFICANT CHANGE UP (ref 3.5–5.3)
POTASSIUM SERPL-SCNC: 4.5 MMOL/L — SIGNIFICANT CHANGE UP (ref 3.5–5.3)
PROT SERPL-MCNC: 6.8 G/DL — SIGNIFICANT CHANGE UP (ref 6–8.3)
RBC # BLD: 4.74 M/UL — SIGNIFICANT CHANGE UP (ref 4.2–5.8)
RBC # FLD: 13.7 % — SIGNIFICANT CHANGE UP (ref 10.3–14.5)
SODIUM SERPL-SCNC: 142 MMOL/L — SIGNIFICANT CHANGE UP (ref 135–145)
WBC # BLD: 6.33 K/UL — SIGNIFICANT CHANGE UP (ref 3.8–10.5)
WBC # FLD AUTO: 6.33 K/UL — SIGNIFICANT CHANGE UP (ref 3.8–10.5)

## 2022-06-13 PROCEDURE — 99214 OFFICE O/P EST MOD 30 MIN: CPT

## 2022-06-14 NOTE — REVIEW OF SYSTEMS
[Negative] : Gastrointestinal [Dysphagia] : no dysphagia [Hoarseness] : no hoarseness [Odynophagia] : no odynophagia [Shortness Of Breath] : no shortness of breath [Wheezing] : no wheezing [Cough] : no cough [Abdominal Pain] : no abdominal pain [Vomiting] : no vomiting [Diarrhea] : no diarrhea [FreeTextEntry6] : episodes of MCCALLUM

## 2022-06-14 NOTE — PHYSICAL EXAM
[Fully active, able to carry on all pre-disease performance without restriction] : Status 0 - Fully active, able to carry on all pre-disease performance without restriction [Normal] : affect appropriate [de-identified] : anicteric  [de-identified] : no JVD; no neck swelling [de-identified] : normal respiratory effort, no audible wheeze [de-identified] : reg rate  [de-identified] : non distended

## 2022-06-14 NOTE — HISTORY OF PRESENT ILLNESS
[Disease: _____________________] : Disease: [unfilled] [AJCC Stage: ____] : AJCC Stage: [unfilled] [de-identified] : Patient is a 65 y/o M with known PMHx of hypothyroidism secondary to thyroidectomy from previous thyroid CA and NIDDM who first presented Dr. Cox c/o of epigastric pain made worse with eating since 5/2021 that patient thought was due to drinking too much coffee.  An EGD was performed on 10/19/21 that showed a large ulcerated mass in the gastric antrum/lesser curvature not involving the pylorus.  Biopsies were taken on the mass and were positive for poorly differentiated carcinoma favoring adenocarcinoma and H. pylori.  The patient was then sent for a CT A/P with contrast that showed a distal gastric mass with appearance of transmural extension along with adjacent perigastric lymphadenopathy and upper retroperitoneal lymphadenopathy.  The patient met with Dr. Maloney on 10/26/21 who ordered a CT Chest and a PET scan.  CT Chest on 10/27/21 showed no intrathoracic metastatic disease.  PET scan is scheduled for 11/2/21.  Plan is for ex-lap with Dr. Maloney with Port placement on 11/3/21.  The patient is here today to establish oncologic care.  The patient states that since starting triple therapy for H. pylori, his abdominal pain has improved.  Denies weight loss, anorexia, N/V, diarrhea, constipation, rectal bleeding or melena.\par  Stage IV disease \par MS-H --> FOLFOX Pembro starting Dec 2021 [de-identified] : poorly differentiated carcinoma [de-identified] : CEA [de-identified] : her2-\par CPS >1\par MSI-High\par TMB - 21 Muts/Mb\par \par KIT R177H\par PTEN K267fs*9, T319fs*1\par APC [id number]*29\par ASXL1 G645fs*58\par CIC K1359cd*91\par CTNNA1 N738fs*53\par EPHB4 amplification\par MLH1 loss\par PBRM1 Q9527sm*102\par SMAD4 R361H\par TP53 R248W  [FreeTextEntry1] : FLOT started 11/11/21 x 1 --> FOLFOX +pembrolizumab--> 5FU Pembro [de-identified] : Patient presents for follow up with his wife and is scheduled for treatment today.  He reports feeling well and has good appetite.  Denies anymore blood in stool.  He admits to still having episodes of MCCALLUM but denies CP, palpitations, cough.

## 2022-06-15 ENCOUNTER — APPOINTMENT (OUTPATIENT)
Dept: INFUSION THERAPY | Facility: HOSPITAL | Age: 67
End: 2022-06-15

## 2022-06-24 ENCOUNTER — RX RENEWAL (OUTPATIENT)
Age: 67
End: 2022-06-24

## 2022-06-27 ENCOUNTER — RESULT REVIEW (OUTPATIENT)
Age: 67
End: 2022-06-27

## 2022-06-27 ENCOUNTER — APPOINTMENT (OUTPATIENT)
Dept: INFUSION THERAPY | Facility: HOSPITAL | Age: 67
End: 2022-06-27

## 2022-06-27 LAB
ALBUMIN SERPL ELPH-MCNC: 4.5 G/DL — SIGNIFICANT CHANGE UP (ref 3.3–5)
ALP SERPL-CCNC: 90 U/L — SIGNIFICANT CHANGE UP (ref 40–120)
ALT FLD-CCNC: 17 U/L — SIGNIFICANT CHANGE UP (ref 10–45)
ANION GAP SERPL CALC-SCNC: 13 MMOL/L — SIGNIFICANT CHANGE UP (ref 5–17)
AST SERPL-CCNC: 20 U/L — SIGNIFICANT CHANGE UP (ref 10–40)
BASOPHILS # BLD AUTO: 0.04 K/UL — SIGNIFICANT CHANGE UP (ref 0–0.2)
BASOPHILS NFR BLD AUTO: 0.6 % — SIGNIFICANT CHANGE UP (ref 0–2)
BILIRUB SERPL-MCNC: 0.4 MG/DL — SIGNIFICANT CHANGE UP (ref 0.2–1.2)
BUN SERPL-MCNC: 18 MG/DL — SIGNIFICANT CHANGE UP (ref 7–23)
CALCIUM SERPL-MCNC: 9.2 MG/DL — SIGNIFICANT CHANGE UP (ref 8.4–10.5)
CHLORIDE SERPL-SCNC: 104 MMOL/L — SIGNIFICANT CHANGE UP (ref 96–108)
CO2 SERPL-SCNC: 25 MMOL/L — SIGNIFICANT CHANGE UP (ref 22–31)
CREAT SERPL-MCNC: 1.02 MG/DL — SIGNIFICANT CHANGE UP (ref 0.5–1.3)
EGFR: 81 ML/MIN/1.73M2 — SIGNIFICANT CHANGE UP
EOSINOPHIL # BLD AUTO: 0.43 K/UL — SIGNIFICANT CHANGE UP (ref 0–0.5)
EOSINOPHIL NFR BLD AUTO: 6.6 % — HIGH (ref 0–6)
GLUCOSE SERPL-MCNC: 215 MG/DL — HIGH (ref 70–99)
HCT VFR BLD CALC: 42.2 % — SIGNIFICANT CHANGE UP (ref 39–50)
HGB BLD-MCNC: 14.6 G/DL — SIGNIFICANT CHANGE UP (ref 13–17)
IMM GRANULOCYTES NFR BLD AUTO: 0.5 % — SIGNIFICANT CHANGE UP (ref 0–1.5)
LYMPHOCYTES # BLD AUTO: 1.63 K/UL — SIGNIFICANT CHANGE UP (ref 1–3.3)
LYMPHOCYTES # BLD AUTO: 24.9 % — SIGNIFICANT CHANGE UP (ref 13–44)
MCHC RBC-ENTMCNC: 30.9 PG — SIGNIFICANT CHANGE UP (ref 27–34)
MCHC RBC-ENTMCNC: 34.6 G/DL — SIGNIFICANT CHANGE UP (ref 32–36)
MCV RBC AUTO: 89.4 FL — SIGNIFICANT CHANGE UP (ref 80–100)
MONOCYTES # BLD AUTO: 0.61 K/UL — SIGNIFICANT CHANGE UP (ref 0–0.9)
MONOCYTES NFR BLD AUTO: 9.3 % — SIGNIFICANT CHANGE UP (ref 2–14)
NEUTROPHILS # BLD AUTO: 3.8 K/UL — SIGNIFICANT CHANGE UP (ref 1.8–7.4)
NEUTROPHILS NFR BLD AUTO: 58.1 % — SIGNIFICANT CHANGE UP (ref 43–77)
NRBC # BLD: 0 /100 WBCS — SIGNIFICANT CHANGE UP (ref 0–0)
PLATELET # BLD AUTO: 213 K/UL — SIGNIFICANT CHANGE UP (ref 150–400)
POTASSIUM SERPL-MCNC: 4.3 MMOL/L — SIGNIFICANT CHANGE UP (ref 3.5–5.3)
POTASSIUM SERPL-SCNC: 4.3 MMOL/L — SIGNIFICANT CHANGE UP (ref 3.5–5.3)
PROT SERPL-MCNC: 6.9 G/DL — SIGNIFICANT CHANGE UP (ref 6–8.3)
RBC # BLD: 4.72 M/UL — SIGNIFICANT CHANGE UP (ref 4.2–5.8)
RBC # FLD: 13.3 % — SIGNIFICANT CHANGE UP (ref 10.3–14.5)
SODIUM SERPL-SCNC: 142 MMOL/L — SIGNIFICANT CHANGE UP (ref 135–145)
WBC # BLD: 6.54 K/UL — SIGNIFICANT CHANGE UP (ref 3.8–10.5)
WBC # FLD AUTO: 6.54 K/UL — SIGNIFICANT CHANGE UP (ref 3.8–10.5)

## 2022-06-29 ENCOUNTER — APPOINTMENT (OUTPATIENT)
Dept: INFUSION THERAPY | Facility: HOSPITAL | Age: 67
End: 2022-06-29

## 2022-07-11 ENCOUNTER — NON-APPOINTMENT (OUTPATIENT)
Age: 67
End: 2022-07-11

## 2022-07-11 ENCOUNTER — RESULT REVIEW (OUTPATIENT)
Age: 67
End: 2022-07-11

## 2022-07-11 ENCOUNTER — APPOINTMENT (OUTPATIENT)
Dept: INFUSION THERAPY | Facility: HOSPITAL | Age: 67
End: 2022-07-11

## 2022-07-11 LAB
ALBUMIN SERPL ELPH-MCNC: 4.3 G/DL — SIGNIFICANT CHANGE UP (ref 3.3–5)
ALP SERPL-CCNC: 96 U/L — SIGNIFICANT CHANGE UP (ref 40–120)
ALT FLD-CCNC: 16 U/L — SIGNIFICANT CHANGE UP (ref 10–45)
ANION GAP SERPL CALC-SCNC: 12 MMOL/L — SIGNIFICANT CHANGE UP (ref 5–17)
AST SERPL-CCNC: 15 U/L — SIGNIFICANT CHANGE UP (ref 10–40)
BASOPHILS # BLD AUTO: 0.04 K/UL — SIGNIFICANT CHANGE UP (ref 0–0.2)
BASOPHILS NFR BLD AUTO: 0.7 % — SIGNIFICANT CHANGE UP (ref 0–2)
BILIRUB SERPL-MCNC: 0.3 MG/DL — SIGNIFICANT CHANGE UP (ref 0.2–1.2)
BUN SERPL-MCNC: 16 MG/DL — SIGNIFICANT CHANGE UP (ref 7–23)
CALCIUM SERPL-MCNC: 9.4 MG/DL — SIGNIFICANT CHANGE UP (ref 8.4–10.5)
CHLORIDE SERPL-SCNC: 101 MMOL/L — SIGNIFICANT CHANGE UP (ref 96–108)
CO2 SERPL-SCNC: 26 MMOL/L — SIGNIFICANT CHANGE UP (ref 22–31)
CREAT SERPL-MCNC: 0.91 MG/DL — SIGNIFICANT CHANGE UP (ref 0.5–1.3)
EGFR: 93 ML/MIN/1.73M2 — SIGNIFICANT CHANGE UP
EOSINOPHIL # BLD AUTO: 0.24 K/UL — SIGNIFICANT CHANGE UP (ref 0–0.5)
EOSINOPHIL NFR BLD AUTO: 3.9 % — SIGNIFICANT CHANGE UP (ref 0–6)
GLUCOSE SERPL-MCNC: 243 MG/DL — HIGH (ref 70–99)
HCT VFR BLD CALC: 41.7 % — SIGNIFICANT CHANGE UP (ref 39–50)
HGB BLD-MCNC: 14.4 G/DL — SIGNIFICANT CHANGE UP (ref 13–17)
IMM GRANULOCYTES NFR BLD AUTO: 0.5 % — SIGNIFICANT CHANGE UP (ref 0–1.5)
LYMPHOCYTES # BLD AUTO: 1.58 K/UL — SIGNIFICANT CHANGE UP (ref 1–3.3)
LYMPHOCYTES # BLD AUTO: 25.9 % — SIGNIFICANT CHANGE UP (ref 13–44)
MCHC RBC-ENTMCNC: 30.6 PG — SIGNIFICANT CHANGE UP (ref 27–34)
MCHC RBC-ENTMCNC: 34.5 G/DL — SIGNIFICANT CHANGE UP (ref 32–36)
MCV RBC AUTO: 88.7 FL — SIGNIFICANT CHANGE UP (ref 80–100)
MONOCYTES # BLD AUTO: 0.66 K/UL — SIGNIFICANT CHANGE UP (ref 0–0.9)
MONOCYTES NFR BLD AUTO: 10.8 % — SIGNIFICANT CHANGE UP (ref 2–14)
NEUTROPHILS # BLD AUTO: 3.55 K/UL — SIGNIFICANT CHANGE UP (ref 1.8–7.4)
NEUTROPHILS NFR BLD AUTO: 58.2 % — SIGNIFICANT CHANGE UP (ref 43–77)
NRBC # BLD: 0 /100 WBCS — SIGNIFICANT CHANGE UP (ref 0–0)
PLATELET # BLD AUTO: 213 K/UL — SIGNIFICANT CHANGE UP (ref 150–400)
POTASSIUM SERPL-MCNC: 4.4 MMOL/L — SIGNIFICANT CHANGE UP (ref 3.5–5.3)
POTASSIUM SERPL-SCNC: 4.4 MMOL/L — SIGNIFICANT CHANGE UP (ref 3.5–5.3)
PROT SERPL-MCNC: 6.8 G/DL — SIGNIFICANT CHANGE UP (ref 6–8.3)
RBC # BLD: 4.7 M/UL — SIGNIFICANT CHANGE UP (ref 4.2–5.8)
RBC # FLD: 13.4 % — SIGNIFICANT CHANGE UP (ref 10.3–14.5)
SODIUM SERPL-SCNC: 139 MMOL/L — SIGNIFICANT CHANGE UP (ref 135–145)
T4 FREE SERPL-MCNC: 1.8 NG/DL — SIGNIFICANT CHANGE UP (ref 0.9–1.8)
T4 FREE+ TSH PNL SERPL: <0.01 UIU/ML — LOW (ref 0.27–4.2)
WBC # BLD: 6.1 K/UL — SIGNIFICANT CHANGE UP (ref 3.8–10.5)
WBC # FLD AUTO: 6.1 K/UL — SIGNIFICANT CHANGE UP (ref 3.8–10.5)

## 2022-07-13 ENCOUNTER — APPOINTMENT (OUTPATIENT)
Dept: INFUSION THERAPY | Facility: HOSPITAL | Age: 67
End: 2022-07-13

## 2022-07-18 ENCOUNTER — NON-APPOINTMENT (OUTPATIENT)
Age: 67
End: 2022-07-18

## 2022-07-25 ENCOUNTER — OUTPATIENT (OUTPATIENT)
Dept: OUTPATIENT SERVICES | Facility: HOSPITAL | Age: 67
LOS: 1 days | Discharge: HOME | End: 2022-07-25

## 2022-07-25 DIAGNOSIS — C16.9 MALIGNANT NEOPLASM OF STOMACH, UNSPECIFIED: ICD-10-CM

## 2022-07-25 DIAGNOSIS — E89.0 POSTPROCEDURAL HYPOTHYROIDISM: Chronic | ICD-10-CM

## 2022-07-25 DIAGNOSIS — Z98.890 OTHER SPECIFIED POSTPROCEDURAL STATES: Chronic | ICD-10-CM

## 2022-08-02 ENCOUNTER — APPOINTMENT (OUTPATIENT)
Dept: NUCLEAR MEDICINE | Facility: IMAGING CENTER | Age: 67
End: 2022-08-02

## 2022-08-02 ENCOUNTER — OUTPATIENT (OUTPATIENT)
Dept: OUTPATIENT SERVICES | Facility: HOSPITAL | Age: 67
LOS: 1 days | End: 2022-08-02
Payer: MEDICARE

## 2022-08-02 DIAGNOSIS — E89.0 POSTPROCEDURAL HYPOTHYROIDISM: Chronic | ICD-10-CM

## 2022-08-02 DIAGNOSIS — Z98.890 OTHER SPECIFIED POSTPROCEDURAL STATES: Chronic | ICD-10-CM

## 2022-08-02 DIAGNOSIS — C16.9 MALIGNANT NEOPLASM OF STOMACH, UNSPECIFIED: ICD-10-CM

## 2022-08-02 PROCEDURE — 78815 PET IMAGE W/CT SKULL-THIGH: CPT

## 2022-08-02 PROCEDURE — 78815 PET IMAGE W/CT SKULL-THIGH: CPT | Mod: 26,PS,MH

## 2022-08-02 PROCEDURE — A9552: CPT

## 2022-08-03 ENCOUNTER — RESULT REVIEW (OUTPATIENT)
Age: 67
End: 2022-08-03

## 2022-08-03 ENCOUNTER — APPOINTMENT (OUTPATIENT)
Dept: INFUSION THERAPY | Facility: HOSPITAL | Age: 67
End: 2022-08-03

## 2022-08-03 ENCOUNTER — NON-APPOINTMENT (OUTPATIENT)
Age: 67
End: 2022-08-03

## 2022-08-03 LAB
ALBUMIN SERPL ELPH-MCNC: 4.5 G/DL — SIGNIFICANT CHANGE UP (ref 3.3–5)
ALP SERPL-CCNC: 106 U/L — SIGNIFICANT CHANGE UP (ref 40–120)
ALT FLD-CCNC: 18 U/L — SIGNIFICANT CHANGE UP (ref 10–45)
ANION GAP SERPL CALC-SCNC: 9 MMOL/L — SIGNIFICANT CHANGE UP (ref 5–17)
AST SERPL-CCNC: 18 U/L — SIGNIFICANT CHANGE UP (ref 10–40)
BASOPHILS # BLD AUTO: 0.05 K/UL — SIGNIFICANT CHANGE UP (ref 0–0.2)
BASOPHILS NFR BLD AUTO: 0.7 % — SIGNIFICANT CHANGE UP (ref 0–2)
BILIRUB SERPL-MCNC: 0.4 MG/DL — SIGNIFICANT CHANGE UP (ref 0.2–1.2)
BUN SERPL-MCNC: 20 MG/DL — SIGNIFICANT CHANGE UP (ref 7–23)
CALCIUM SERPL-MCNC: 9.1 MG/DL — SIGNIFICANT CHANGE UP (ref 8.4–10.5)
CHLORIDE SERPL-SCNC: 103 MMOL/L — SIGNIFICANT CHANGE UP (ref 96–108)
CO2 SERPL-SCNC: 26 MMOL/L — SIGNIFICANT CHANGE UP (ref 22–31)
CREAT SERPL-MCNC: 0.84 MG/DL — SIGNIFICANT CHANGE UP (ref 0.5–1.3)
EGFR: 96 ML/MIN/1.73M2 — SIGNIFICANT CHANGE UP
EOSINOPHIL # BLD AUTO: 0.14 K/UL — SIGNIFICANT CHANGE UP (ref 0–0.5)
EOSINOPHIL NFR BLD AUTO: 1.9 % — SIGNIFICANT CHANGE UP (ref 0–6)
GLUCOSE SERPL-MCNC: 190 MG/DL — HIGH (ref 70–99)
HCT VFR BLD CALC: 42.9 % — SIGNIFICANT CHANGE UP (ref 39–50)
HGB BLD-MCNC: 14.4 G/DL — SIGNIFICANT CHANGE UP (ref 13–17)
IMM GRANULOCYTES NFR BLD AUTO: 0.4 % — SIGNIFICANT CHANGE UP (ref 0–1.5)
LYMPHOCYTES # BLD AUTO: 1.38 K/UL — SIGNIFICANT CHANGE UP (ref 1–3.3)
LYMPHOCYTES # BLD AUTO: 18.6 % — SIGNIFICANT CHANGE UP (ref 13–44)
MCHC RBC-ENTMCNC: 30.3 PG — SIGNIFICANT CHANGE UP (ref 27–34)
MCHC RBC-ENTMCNC: 33.6 G/DL — SIGNIFICANT CHANGE UP (ref 32–36)
MCV RBC AUTO: 90.3 FL — SIGNIFICANT CHANGE UP (ref 80–100)
MONOCYTES # BLD AUTO: 0.78 K/UL — SIGNIFICANT CHANGE UP (ref 0–0.9)
MONOCYTES NFR BLD AUTO: 10.5 % — SIGNIFICANT CHANGE UP (ref 2–14)
NEUTROPHILS # BLD AUTO: 5.02 K/UL — SIGNIFICANT CHANGE UP (ref 1.8–7.4)
NEUTROPHILS NFR BLD AUTO: 67.9 % — SIGNIFICANT CHANGE UP (ref 43–77)
NRBC # BLD: 0 /100 WBCS — SIGNIFICANT CHANGE UP (ref 0–0)
PLATELET # BLD AUTO: 290 K/UL — SIGNIFICANT CHANGE UP (ref 150–400)
POTASSIUM SERPL-MCNC: 4.2 MMOL/L — SIGNIFICANT CHANGE UP (ref 3.5–5.3)
POTASSIUM SERPL-SCNC: 4.2 MMOL/L — SIGNIFICANT CHANGE UP (ref 3.5–5.3)
PROT SERPL-MCNC: 6.8 G/DL — SIGNIFICANT CHANGE UP (ref 6–8.3)
RBC # BLD: 4.75 M/UL — SIGNIFICANT CHANGE UP (ref 4.2–5.8)
RBC # FLD: 13.8 % — SIGNIFICANT CHANGE UP (ref 10.3–14.5)
SODIUM SERPL-SCNC: 139 MMOL/L — SIGNIFICANT CHANGE UP (ref 135–145)
WBC # BLD: 7.4 K/UL — SIGNIFICANT CHANGE UP (ref 3.8–10.5)
WBC # FLD AUTO: 7.4 K/UL — SIGNIFICANT CHANGE UP (ref 3.8–10.5)

## 2022-08-04 DIAGNOSIS — Z51.11 ENCOUNTER FOR ANTINEOPLASTIC CHEMOTHERAPY: ICD-10-CM

## 2022-08-04 DIAGNOSIS — R11.2 NAUSEA WITH VOMITING, UNSPECIFIED: ICD-10-CM

## 2022-08-05 ENCOUNTER — APPOINTMENT (OUTPATIENT)
Dept: INFUSION THERAPY | Facility: HOSPITAL | Age: 67
End: 2022-08-05

## 2022-08-05 ENCOUNTER — APPOINTMENT (OUTPATIENT)
Dept: HEMATOLOGY ONCOLOGY | Facility: CLINIC | Age: 67
End: 2022-08-05

## 2022-08-05 VITALS
RESPIRATION RATE: 16 BRPM | WEIGHT: 217.82 LBS | HEIGHT: 67.01 IN | HEART RATE: 82 BPM | OXYGEN SATURATION: 95 % | BODY MASS INDEX: 34.19 KG/M2 | SYSTOLIC BLOOD PRESSURE: 153 MMHG | DIASTOLIC BLOOD PRESSURE: 81 MMHG | TEMPERATURE: 97.8 F

## 2022-08-05 DIAGNOSIS — Z51.11 ENCOUNTER FOR ANTINEOPLASTIC CHEMOTHERAPY: ICD-10-CM

## 2022-08-05 PROCEDURE — 99215 OFFICE O/P EST HI 40 MIN: CPT

## 2022-08-05 NOTE — PHYSICAL EXAM
[Fully active, able to carry on all pre-disease performance without restriction] : Status 0 - Fully active, able to carry on all pre-disease performance without restriction [Normal] : affect appropriate [de-identified] : anicteric  [de-identified] : no JVD; no neck swelling [de-identified] : normal respiratory effort, no audible wheeze [de-identified] : reg rate  [de-identified] : non distended

## 2022-08-05 NOTE — REVIEW OF SYSTEMS
[Negative] : Allergic/Immunologic [Dysphagia] : no dysphagia [Hoarseness] : no hoarseness [Odynophagia] : no odynophagia [Shortness Of Breath] : no shortness of breath [Wheezing] : no wheezing [Cough] : no cough [Abdominal Pain] : no abdominal pain [Vomiting] : no vomiting [Diarrhea] : no diarrhea

## 2022-08-05 NOTE — HISTORY OF PRESENT ILLNESS
[Disease: _____________________] : Disease: [unfilled] [AJCC Stage: ____] : AJCC Stage: [unfilled] [de-identified] : Patient is a 65 y/o M with known PMHx of hypothyroidism secondary to thyroidectomy from previous thyroid CA and NIDDM who first presented Dr. Cox c/o of epigastric pain made worse with eating since 5/2021 that patient thought was due to drinking too much coffee.  An EGD was performed on 10/19/21 that showed a large ulcerated mass in the gastric antrum/lesser curvature not involving the pylorus.  Biopsies were taken on the mass and were positive for poorly differentiated carcinoma favoring adenocarcinoma and H. pylori.  The patient was then sent for a CT A/P with contrast that showed a distal gastric mass with appearance of transmural extension along with adjacent perigastric lymphadenopathy and upper retroperitoneal lymphadenopathy.  The patient met with Dr. Maloney on 10/26/21 who ordered a CT Chest and a PET scan.  CT Chest on 10/27/21 showed no intrathoracic metastatic disease.  PET scan is scheduled for 11/2/21.  Plan is for ex-lap with Dr. Maloney with Port placement on 11/3/21.  The patient is here today to establish oncologic care.  The patient states that since starting triple therapy for H. pylori, his abdominal pain has improved.  Denies weight loss, anorexia, N/V, diarrhea, constipation, rectal bleeding or melena.\par  Stage IV disease \par MS-H --> FOLFOX Pembro starting Dec 2021 [de-identified] : poorly differentiated carcinoma [de-identified] : CEA [de-identified] : her2-\par CPS >1\par MSI-High\par TMB - 21 Muts/Mb\par \par KIT R177H\par PTEN K267fs*9, T319fs*1\par APC W7111xo*29\par ASXL1 G645fs*58\par CIC Z1736nc*91\par CTNNA1 N738fs*53\par EPHB4 amplification\par MLH1 loss\par PBRM1 K5156gb*102\par SMAD4 R361H\par TP53 R248W  [FreeTextEntry1] : FLOT started 11/11/21 x 1 --> FOLFOX +pembrolizumab--> 5FU Pembro [de-identified] : here for follow up , here to review recent imaging\par had some time off and traveled to [place] \par restarted treatment this week \par no diarrhea fevers or chills

## 2022-08-15 ENCOUNTER — APPOINTMENT (OUTPATIENT)
Dept: INFUSION THERAPY | Facility: HOSPITAL | Age: 67
End: 2022-08-15

## 2022-08-15 ENCOUNTER — RESULT REVIEW (OUTPATIENT)
Age: 67
End: 2022-08-15

## 2022-08-15 LAB
ALBUMIN SERPL ELPH-MCNC: 4.2 G/DL — SIGNIFICANT CHANGE UP (ref 3.3–5)
ALP SERPL-CCNC: 114 U/L — SIGNIFICANT CHANGE UP (ref 40–120)
ALT FLD-CCNC: 20 U/L — SIGNIFICANT CHANGE UP (ref 10–45)
ANION GAP SERPL CALC-SCNC: 16 MMOL/L — SIGNIFICANT CHANGE UP (ref 5–17)
AST SERPL-CCNC: 17 U/L — SIGNIFICANT CHANGE UP (ref 10–40)
BASOPHILS # BLD AUTO: 0.05 K/UL — SIGNIFICANT CHANGE UP (ref 0–0.2)
BASOPHILS NFR BLD AUTO: 0.7 % — SIGNIFICANT CHANGE UP (ref 0–2)
BILIRUB SERPL-MCNC: 0.3 MG/DL — SIGNIFICANT CHANGE UP (ref 0.2–1.2)
BUN SERPL-MCNC: 16 MG/DL — SIGNIFICANT CHANGE UP (ref 7–23)
CALCIUM SERPL-MCNC: 9.1 MG/DL — SIGNIFICANT CHANGE UP (ref 8.4–10.5)
CHLORIDE SERPL-SCNC: 101 MMOL/L — SIGNIFICANT CHANGE UP (ref 96–108)
CO2 SERPL-SCNC: 24 MMOL/L — SIGNIFICANT CHANGE UP (ref 22–31)
CREAT SERPL-MCNC: 0.82 MG/DL — SIGNIFICANT CHANGE UP (ref 0.5–1.3)
EGFR: 97 ML/MIN/1.73M2 — SIGNIFICANT CHANGE UP
EOSINOPHIL # BLD AUTO: 0.37 K/UL — SIGNIFICANT CHANGE UP (ref 0–0.5)
EOSINOPHIL NFR BLD AUTO: 4.8 % — SIGNIFICANT CHANGE UP (ref 0–6)
GLUCOSE SERPL-MCNC: 218 MG/DL — HIGH (ref 70–99)
HCT VFR BLD CALC: 41.4 % — SIGNIFICANT CHANGE UP (ref 39–50)
HGB BLD-MCNC: 14.3 G/DL — SIGNIFICANT CHANGE UP (ref 13–17)
IMM GRANULOCYTES NFR BLD AUTO: 0.3 % — SIGNIFICANT CHANGE UP (ref 0–1.5)
LYMPHOCYTES # BLD AUTO: 1.63 K/UL — SIGNIFICANT CHANGE UP (ref 1–3.3)
LYMPHOCYTES # BLD AUTO: 21.3 % — SIGNIFICANT CHANGE UP (ref 13–44)
MCHC RBC-ENTMCNC: 30.2 PG — SIGNIFICANT CHANGE UP (ref 27–34)
MCHC RBC-ENTMCNC: 34.5 G/DL — SIGNIFICANT CHANGE UP (ref 32–36)
MCV RBC AUTO: 87.5 FL — SIGNIFICANT CHANGE UP (ref 80–100)
MONOCYTES # BLD AUTO: 0.77 K/UL — SIGNIFICANT CHANGE UP (ref 0–0.9)
MONOCYTES NFR BLD AUTO: 10.1 % — SIGNIFICANT CHANGE UP (ref 2–14)
NEUTROPHILS # BLD AUTO: 4.8 K/UL — SIGNIFICANT CHANGE UP (ref 1.8–7.4)
NEUTROPHILS NFR BLD AUTO: 62.8 % — SIGNIFICANT CHANGE UP (ref 43–77)
NRBC # BLD: 0 /100 WBCS — SIGNIFICANT CHANGE UP (ref 0–0)
PLATELET # BLD AUTO: 247 K/UL — SIGNIFICANT CHANGE UP (ref 150–400)
POTASSIUM SERPL-MCNC: 4.2 MMOL/L — SIGNIFICANT CHANGE UP (ref 3.5–5.3)
POTASSIUM SERPL-SCNC: 4.2 MMOL/L — SIGNIFICANT CHANGE UP (ref 3.5–5.3)
PROT SERPL-MCNC: 6.5 G/DL — SIGNIFICANT CHANGE UP (ref 6–8.3)
RBC # BLD: 4.73 M/UL — SIGNIFICANT CHANGE UP (ref 4.2–5.8)
RBC # FLD: 13.7 % — SIGNIFICANT CHANGE UP (ref 10.3–14.5)
SODIUM SERPL-SCNC: 141 MMOL/L — SIGNIFICANT CHANGE UP (ref 135–145)
WBC # BLD: 7.64 K/UL — SIGNIFICANT CHANGE UP (ref 3.8–10.5)
WBC # FLD AUTO: 7.64 K/UL — SIGNIFICANT CHANGE UP (ref 3.8–10.5)

## 2022-08-17 ENCOUNTER — APPOINTMENT (OUTPATIENT)
Dept: INFUSION THERAPY | Facility: HOSPITAL | Age: 67
End: 2022-08-17

## 2022-08-29 ENCOUNTER — RESULT REVIEW (OUTPATIENT)
Age: 67
End: 2022-08-29

## 2022-08-29 ENCOUNTER — APPOINTMENT (OUTPATIENT)
Dept: INFUSION THERAPY | Facility: HOSPITAL | Age: 67
End: 2022-08-29

## 2022-08-29 LAB
ALBUMIN SERPL ELPH-MCNC: 4.5 G/DL — SIGNIFICANT CHANGE UP (ref 3.3–5)
ALP SERPL-CCNC: 101 U/L — SIGNIFICANT CHANGE UP (ref 40–120)
ALT FLD-CCNC: 14 U/L — SIGNIFICANT CHANGE UP (ref 10–45)
ANION GAP SERPL CALC-SCNC: 12 MMOL/L — SIGNIFICANT CHANGE UP (ref 5–17)
AST SERPL-CCNC: 17 U/L — SIGNIFICANT CHANGE UP (ref 10–40)
BASOPHILS # BLD AUTO: 0.06 K/UL — SIGNIFICANT CHANGE UP (ref 0–0.2)
BASOPHILS NFR BLD AUTO: 0.9 % — SIGNIFICANT CHANGE UP (ref 0–2)
BILIRUB SERPL-MCNC: 0.3 MG/DL — SIGNIFICANT CHANGE UP (ref 0.2–1.2)
BUN SERPL-MCNC: 20 MG/DL — SIGNIFICANT CHANGE UP (ref 7–23)
CALCIUM SERPL-MCNC: 9.1 MG/DL — SIGNIFICANT CHANGE UP (ref 8.4–10.5)
CHLORIDE SERPL-SCNC: 106 MMOL/L — SIGNIFICANT CHANGE UP (ref 96–108)
CO2 SERPL-SCNC: 24 MMOL/L — SIGNIFICANT CHANGE UP (ref 22–31)
CREAT SERPL-MCNC: 1 MG/DL — SIGNIFICANT CHANGE UP (ref 0.5–1.3)
EGFR: 83 ML/MIN/1.73M2 — SIGNIFICANT CHANGE UP
EOSINOPHIL # BLD AUTO: 0.43 K/UL — SIGNIFICANT CHANGE UP (ref 0–0.5)
EOSINOPHIL NFR BLD AUTO: 6.5 % — HIGH (ref 0–6)
GLUCOSE SERPL-MCNC: 240 MG/DL — HIGH (ref 70–99)
HCT VFR BLD CALC: 42.3 % — SIGNIFICANT CHANGE UP (ref 39–50)
HGB BLD-MCNC: 14.2 G/DL — SIGNIFICANT CHANGE UP (ref 13–17)
IMM GRANULOCYTES NFR BLD AUTO: 0.5 % — SIGNIFICANT CHANGE UP (ref 0–1.5)
LYMPHOCYTES # BLD AUTO: 1.8 K/UL — SIGNIFICANT CHANGE UP (ref 1–3.3)
LYMPHOCYTES # BLD AUTO: 27.2 % — SIGNIFICANT CHANGE UP (ref 13–44)
MCHC RBC-ENTMCNC: 30.3 PG — SIGNIFICANT CHANGE UP (ref 27–34)
MCHC RBC-ENTMCNC: 33.6 G/DL — SIGNIFICANT CHANGE UP (ref 32–36)
MCV RBC AUTO: 90.2 FL — SIGNIFICANT CHANGE UP (ref 80–100)
MONOCYTES # BLD AUTO: 0.64 K/UL — SIGNIFICANT CHANGE UP (ref 0–0.9)
MONOCYTES NFR BLD AUTO: 9.7 % — SIGNIFICANT CHANGE UP (ref 2–14)
NEUTROPHILS # BLD AUTO: 3.65 K/UL — SIGNIFICANT CHANGE UP (ref 1.8–7.4)
NEUTROPHILS NFR BLD AUTO: 55.2 % — SIGNIFICANT CHANGE UP (ref 43–77)
NRBC # BLD: 0 /100 WBCS — SIGNIFICANT CHANGE UP (ref 0–0)
PLATELET # BLD AUTO: 279 K/UL — SIGNIFICANT CHANGE UP (ref 150–400)
POTASSIUM SERPL-MCNC: 4.3 MMOL/L — SIGNIFICANT CHANGE UP (ref 3.5–5.3)
POTASSIUM SERPL-SCNC: 4.3 MMOL/L — SIGNIFICANT CHANGE UP (ref 3.5–5.3)
PROT SERPL-MCNC: 6.7 G/DL — SIGNIFICANT CHANGE UP (ref 6–8.3)
RBC # BLD: 4.69 M/UL — SIGNIFICANT CHANGE UP (ref 4.2–5.8)
RBC # FLD: 13.8 % — SIGNIFICANT CHANGE UP (ref 10.3–14.5)
SODIUM SERPL-SCNC: 143 MMOL/L — SIGNIFICANT CHANGE UP (ref 135–145)
T4 FREE SERPL-MCNC: 1.5 NG/DL — SIGNIFICANT CHANGE UP (ref 0.9–1.8)
T4 FREE+ TSH PNL SERPL: 0.01 UIU/ML — LOW (ref 0.27–4.2)
WBC # BLD: 6.61 K/UL — SIGNIFICANT CHANGE UP (ref 3.8–10.5)
WBC # FLD AUTO: 6.61 K/UL — SIGNIFICANT CHANGE UP (ref 3.8–10.5)

## 2022-08-31 ENCOUNTER — APPOINTMENT (OUTPATIENT)
Dept: INFUSION THERAPY | Facility: HOSPITAL | Age: 67
End: 2022-08-31

## 2022-08-31 DIAGNOSIS — C16.8 MALIGNANT NEOPLASM OF OVERLAPPING SITES OF STOMACH: ICD-10-CM

## 2022-09-21 ENCOUNTER — NON-APPOINTMENT (OUTPATIENT)
Age: 67
End: 2022-09-21

## 2022-09-21 ENCOUNTER — RESULT REVIEW (OUTPATIENT)
Age: 67
End: 2022-09-21

## 2022-09-21 ENCOUNTER — APPOINTMENT (OUTPATIENT)
Dept: INFUSION THERAPY | Facility: HOSPITAL | Age: 67
End: 2022-09-21

## 2022-09-21 LAB
ALBUMIN SERPL ELPH-MCNC: 4.4 G/DL — SIGNIFICANT CHANGE UP (ref 3.3–5)
ALP SERPL-CCNC: 108 U/L — SIGNIFICANT CHANGE UP (ref 40–120)
ALT FLD-CCNC: 21 U/L — SIGNIFICANT CHANGE UP (ref 10–45)
ANION GAP SERPL CALC-SCNC: 13 MMOL/L — SIGNIFICANT CHANGE UP (ref 5–17)
AST SERPL-CCNC: 26 U/L — SIGNIFICANT CHANGE UP (ref 10–40)
BASOPHILS # BLD AUTO: 0.04 K/UL — SIGNIFICANT CHANGE UP (ref 0–0.2)
BASOPHILS NFR BLD AUTO: 0.4 % — SIGNIFICANT CHANGE UP (ref 0–2)
BILIRUB SERPL-MCNC: 0.3 MG/DL — SIGNIFICANT CHANGE UP (ref 0.2–1.2)
BUN SERPL-MCNC: 19 MG/DL — SIGNIFICANT CHANGE UP (ref 7–23)
CALCIUM SERPL-MCNC: 9.5 MG/DL — SIGNIFICANT CHANGE UP (ref 8.4–10.5)
CHLORIDE SERPL-SCNC: 103 MMOL/L — SIGNIFICANT CHANGE UP (ref 96–108)
CO2 SERPL-SCNC: 24 MMOL/L — SIGNIFICANT CHANGE UP (ref 22–31)
CREAT SERPL-MCNC: 0.9 MG/DL — SIGNIFICANT CHANGE UP (ref 0.5–1.3)
EGFR: 94 ML/MIN/1.73M2 — SIGNIFICANT CHANGE UP
EOSINOPHIL # BLD AUTO: 0.29 K/UL — SIGNIFICANT CHANGE UP (ref 0–0.5)
EOSINOPHIL NFR BLD AUTO: 3.2 % — SIGNIFICANT CHANGE UP (ref 0–6)
GLUCOSE SERPL-MCNC: 220 MG/DL — HIGH (ref 70–99)
HCT VFR BLD CALC: 43.8 % — SIGNIFICANT CHANGE UP (ref 39–50)
HGB BLD-MCNC: 14.8 G/DL — SIGNIFICANT CHANGE UP (ref 13–17)
IMM GRANULOCYTES NFR BLD AUTO: 0.3 % — SIGNIFICANT CHANGE UP (ref 0–0.9)
LYMPHOCYTES # BLD AUTO: 2.03 K/UL — SIGNIFICANT CHANGE UP (ref 1–3.3)
LYMPHOCYTES # BLD AUTO: 22.4 % — SIGNIFICANT CHANGE UP (ref 13–44)
MCHC RBC-ENTMCNC: 30 PG — SIGNIFICANT CHANGE UP (ref 27–34)
MCHC RBC-ENTMCNC: 33.8 G/DL — SIGNIFICANT CHANGE UP (ref 32–36)
MCV RBC AUTO: 88.8 FL — SIGNIFICANT CHANGE UP (ref 80–100)
MONOCYTES # BLD AUTO: 0.79 K/UL — SIGNIFICANT CHANGE UP (ref 0–0.9)
MONOCYTES NFR BLD AUTO: 8.7 % — SIGNIFICANT CHANGE UP (ref 2–14)
NEUTROPHILS # BLD AUTO: 5.88 K/UL — SIGNIFICANT CHANGE UP (ref 1.8–7.4)
NEUTROPHILS NFR BLD AUTO: 65 % — SIGNIFICANT CHANGE UP (ref 43–77)
NRBC # BLD: 0 /100 WBCS — SIGNIFICANT CHANGE UP (ref 0–0)
PLATELET # BLD AUTO: 287 K/UL — SIGNIFICANT CHANGE UP (ref 150–400)
POTASSIUM SERPL-MCNC: 4.2 MMOL/L — SIGNIFICANT CHANGE UP (ref 3.5–5.3)
POTASSIUM SERPL-SCNC: 4.2 MMOL/L — SIGNIFICANT CHANGE UP (ref 3.5–5.3)
PROT SERPL-MCNC: 6.7 G/DL — SIGNIFICANT CHANGE UP (ref 6–8.3)
RBC # BLD: 4.93 M/UL — SIGNIFICANT CHANGE UP (ref 4.2–5.8)
RBC # FLD: 14.2 % — SIGNIFICANT CHANGE UP (ref 10.3–14.5)
SODIUM SERPL-SCNC: 140 MMOL/L — SIGNIFICANT CHANGE UP (ref 135–145)
WBC # BLD: 9.06 K/UL — SIGNIFICANT CHANGE UP (ref 3.8–10.5)
WBC # FLD AUTO: 9.06 K/UL — SIGNIFICANT CHANGE UP (ref 3.8–10.5)

## 2022-09-23 ENCOUNTER — APPOINTMENT (OUTPATIENT)
Dept: INFUSION THERAPY | Facility: HOSPITAL | Age: 67
End: 2022-09-23

## 2022-09-23 ENCOUNTER — APPOINTMENT (OUTPATIENT)
Dept: HEMATOLOGY ONCOLOGY | Facility: CLINIC | Age: 67
End: 2022-09-23

## 2022-09-23 VITALS
DIASTOLIC BLOOD PRESSURE: 78 MMHG | OXYGEN SATURATION: 97 % | TEMPERATURE: 97.2 F | HEIGHT: 67.01 IN | RESPIRATION RATE: 16 BRPM | BODY MASS INDEX: 34.29 KG/M2 | HEART RATE: 78 BPM | WEIGHT: 218.48 LBS | SYSTOLIC BLOOD PRESSURE: 131 MMHG

## 2022-09-23 PROCEDURE — 99214 OFFICE O/P EST MOD 30 MIN: CPT

## 2022-09-23 NOTE — HISTORY OF PRESENT ILLNESS
[Disease: _____________________] : Disease: [unfilled] [AJCC Stage: ____] : AJCC Stage: [unfilled] [de-identified] : Patient is a 65 y/o M with known PMHx of hypothyroidism secondary to thyroidectomy from previous thyroid CA and NIDDM who first presented Dr. Cox c/o of epigastric pain made worse with eating since 5/2021 that patient thought was due to drinking too much coffee.  An EGD was performed on 10/19/21 that showed a large ulcerated mass in the gastric antrum/lesser curvature not involving the pylorus.  Biopsies were taken on the mass and were positive for poorly differentiated carcinoma favoring adenocarcinoma and H. pylori.  The patient was then sent for a CT A/P with contrast that showed a distal gastric mass with appearance of transmural extension along with adjacent perigastric lymphadenopathy and upper retroperitoneal lymphadenopathy.  The patient met with Dr. Maloney on 10/26/21 who ordered a CT Chest and a PET scan.  CT Chest on 10/27/21 showed no intrathoracic metastatic disease.  PET scan is scheduled for 11/2/21.  Plan is for ex-lap with Dr. Maloney with Port placement on 11/3/21.  The patient is here today to establish oncologic care.  The patient states that since starting triple therapy for H. pylori, his abdominal pain has improved.  Denies weight loss, anorexia, N/V, diarrhea, constipation, rectal bleeding or melena.\par  Stage IV disease \par MS-H --> FOLFOX Pembro starting Dec 2021 [de-identified] : poorly differentiated carcinoma [de-identified] : CEA [de-identified] : her2-\par CPS >1\par MSI-High\par TMB - 21 Muts/Mb\par \par KIT R177H\par PTEN K267fs*9, T319fs*1\par APC [id number]*29\par ASXL1 G645fs*58\par CIC E3534du*91\par CTNNA1 N738fs*53\par EPHB4 amplification\par MLH1 loss\par PBRM1 F4910bq*102\par SMAD4 R361H\par TP53 R248W  [FreeTextEntry1] : FLOT started 11/11/21 x 1 --> FOLFOX +pembrolizumab--> 5FU Pembro [de-identified] : patient presents in followup and no acute complaints\par no new complaints\par planning to travel to [facility] in November\par completed 5Fu infusion recently \par no new complaints

## 2022-09-23 NOTE — PHYSICAL EXAM
[Fully active, able to carry on all pre-disease performance without restriction] : Status 0 - Fully active, able to carry on all pre-disease performance without restriction [Normal] : affect appropriate [de-identified] : anicteric  [de-identified] : no JVD; no neck swelling [de-identified] : normal respiratory effort, no audible wheeze [de-identified] : reg rate  [de-identified] : non distended

## 2022-09-29 ENCOUNTER — RX RENEWAL (OUTPATIENT)
Age: 67
End: 2022-09-29

## 2022-10-04 ENCOUNTER — OUTPATIENT (OUTPATIENT)
Dept: OUTPATIENT SERVICES | Facility: HOSPITAL | Age: 67
LOS: 1 days | Discharge: HOME | End: 2022-10-04

## 2022-10-04 ENCOUNTER — APPOINTMENT (OUTPATIENT)
Dept: INFUSION THERAPY | Facility: HOSPITAL | Age: 67
End: 2022-10-04

## 2022-10-04 DIAGNOSIS — C16.9 MALIGNANT NEOPLASM OF STOMACH, UNSPECIFIED: ICD-10-CM

## 2022-10-04 DIAGNOSIS — E89.0 POSTPROCEDURAL HYPOTHYROIDISM: Chronic | ICD-10-CM

## 2022-10-04 DIAGNOSIS — Z98.890 OTHER SPECIFIED POSTPROCEDURAL STATES: Chronic | ICD-10-CM

## 2022-10-06 ENCOUNTER — APPOINTMENT (OUTPATIENT)
Dept: INFUSION THERAPY | Facility: HOSPITAL | Age: 67
End: 2022-10-06

## 2022-10-11 ENCOUNTER — APPOINTMENT (OUTPATIENT)
Dept: INFUSION THERAPY | Facility: HOSPITAL | Age: 67
End: 2022-10-11

## 2022-10-11 ENCOUNTER — RESULT REVIEW (OUTPATIENT)
Age: 67
End: 2022-10-11

## 2022-10-11 ENCOUNTER — APPOINTMENT (OUTPATIENT)
Dept: ENDOCRINOLOGY | Facility: CLINIC | Age: 67
End: 2022-10-11

## 2022-10-11 VITALS — DIASTOLIC BLOOD PRESSURE: 68 MMHG | SYSTOLIC BLOOD PRESSURE: 120 MMHG

## 2022-10-11 VITALS
TEMPERATURE: 97.8 F | HEIGHT: 67.01 IN | HEART RATE: 82 BPM | DIASTOLIC BLOOD PRESSURE: 80 MMHG | BODY MASS INDEX: 34.21 KG/M2 | WEIGHT: 218 LBS | RESPIRATION RATE: 18 BRPM | SYSTOLIC BLOOD PRESSURE: 147 MMHG | OXYGEN SATURATION: 94 %

## 2022-10-11 LAB
ALBUMIN SERPL ELPH-MCNC: 4.3 G/DL — SIGNIFICANT CHANGE UP (ref 3.3–5)
ALP SERPL-CCNC: 87 U/L — SIGNIFICANT CHANGE UP (ref 40–120)
ALT FLD-CCNC: 16 U/L — SIGNIFICANT CHANGE UP (ref 10–45)
ANION GAP SERPL CALC-SCNC: 16 MMOL/L — SIGNIFICANT CHANGE UP (ref 5–17)
AST SERPL-CCNC: 17 U/L — SIGNIFICANT CHANGE UP (ref 10–40)
BASOPHILS # BLD AUTO: 0.04 K/UL — SIGNIFICANT CHANGE UP (ref 0–0.2)
BASOPHILS NFR BLD AUTO: 0.7 % — SIGNIFICANT CHANGE UP (ref 0–2)
BILIRUB SERPL-MCNC: 0.2 MG/DL — SIGNIFICANT CHANGE UP (ref 0.2–1.2)
BUN SERPL-MCNC: 19 MG/DL — SIGNIFICANT CHANGE UP (ref 7–23)
CALCIUM SERPL-MCNC: 9.6 MG/DL — SIGNIFICANT CHANGE UP (ref 8.4–10.5)
CEA SERPL-MCNC: 3.4 NG/ML — SIGNIFICANT CHANGE UP (ref 0–3.8)
CHLORIDE SERPL-SCNC: 102 MMOL/L — SIGNIFICANT CHANGE UP (ref 96–108)
CO2 SERPL-SCNC: 23 MMOL/L — SIGNIFICANT CHANGE UP (ref 22–31)
CREAT SERPL-MCNC: 1 MG/DL — SIGNIFICANT CHANGE UP (ref 0.5–1.3)
EGFR: 83 ML/MIN/1.73M2 — SIGNIFICANT CHANGE UP
EOSINOPHIL # BLD AUTO: 0.31 K/UL — SIGNIFICANT CHANGE UP (ref 0–0.5)
EOSINOPHIL NFR BLD AUTO: 5.3 % — SIGNIFICANT CHANGE UP (ref 0–6)
GLUCOSE SERPL-MCNC: 143 MG/DL — HIGH (ref 70–99)
HBA1C MFR BLD HPLC: 8.3
HCT VFR BLD CALC: 43 % — SIGNIFICANT CHANGE UP (ref 39–50)
HGB BLD-MCNC: 14.3 G/DL — SIGNIFICANT CHANGE UP (ref 13–17)
IMM GRANULOCYTES NFR BLD AUTO: 0.3 % — SIGNIFICANT CHANGE UP (ref 0–0.9)
LYMPHOCYTES # BLD AUTO: 1.3 K/UL — SIGNIFICANT CHANGE UP (ref 1–3.3)
LYMPHOCYTES # BLD AUTO: 22.2 % — SIGNIFICANT CHANGE UP (ref 13–44)
MCHC RBC-ENTMCNC: 29.5 PG — SIGNIFICANT CHANGE UP (ref 27–34)
MCHC RBC-ENTMCNC: 33.3 G/DL — SIGNIFICANT CHANGE UP (ref 32–36)
MCV RBC AUTO: 88.7 FL — SIGNIFICANT CHANGE UP (ref 80–100)
MONOCYTES # BLD AUTO: 0.97 K/UL — HIGH (ref 0–0.9)
MONOCYTES NFR BLD AUTO: 16.6 % — HIGH (ref 2–14)
NEUTROPHILS # BLD AUTO: 3.21 K/UL — SIGNIFICANT CHANGE UP (ref 1.8–7.4)
NEUTROPHILS NFR BLD AUTO: 54.9 % — SIGNIFICANT CHANGE UP (ref 43–77)
NRBC # BLD: 0 /100 WBCS — SIGNIFICANT CHANGE UP (ref 0–0)
PLATELET # BLD AUTO: 306 K/UL — SIGNIFICANT CHANGE UP (ref 150–400)
POTASSIUM SERPL-MCNC: 4.2 MMOL/L — SIGNIFICANT CHANGE UP (ref 3.5–5.3)
POTASSIUM SERPL-SCNC: 4.2 MMOL/L — SIGNIFICANT CHANGE UP (ref 3.5–5.3)
PROT SERPL-MCNC: 6.9 G/DL — SIGNIFICANT CHANGE UP (ref 6–8.3)
RBC # BLD: 4.85 M/UL — SIGNIFICANT CHANGE UP (ref 4.2–5.8)
RBC # FLD: 13.4 % — SIGNIFICANT CHANGE UP (ref 10.3–14.5)
SODIUM SERPL-SCNC: 141 MMOL/L — SIGNIFICANT CHANGE UP (ref 135–145)
T4 FREE+ TSH PNL SERPL: 0.01 UIU/ML — LOW (ref 0.27–4.2)
WBC # BLD: 5.85 K/UL — SIGNIFICANT CHANGE UP (ref 3.8–10.5)
WBC # FLD AUTO: 5.85 K/UL — SIGNIFICANT CHANGE UP (ref 3.8–10.5)

## 2022-10-11 PROCEDURE — 83036 HEMOGLOBIN GLYCOSYLATED A1C: CPT | Mod: QW

## 2022-10-11 PROCEDURE — 99214 OFFICE O/P EST MOD 30 MIN: CPT | Mod: 25

## 2022-10-11 RX ORDER — PANTOPRAZOLE 40 MG/1
40 TABLET, DELAYED RELEASE ORAL
Qty: 30 | Refills: 2 | Status: ACTIVE | COMMUNITY
Start: 2022-10-11

## 2022-10-11 RX ORDER — MESALAMINE 1.2 G/1
1.2 TABLET, DELAYED RELEASE ORAL
Refills: 0 | Status: ACTIVE | COMMUNITY
Start: 2022-10-11

## 2022-10-11 NOTE — HISTORY OF PRESENT ILLNESS
02-Mar-2019 12:14
[FreeTextEntry1] : 66 y.o. male with h/o hypothyroidism s/p surgery for papillary thyroid cancer and Type 2 DM here for follow up visit. Patient saw GI, Dr. Bee with c/o epigastric pain made worse when eating. Had EGD on 10/19/21 showing a large ulcerated mass in the gastric antrum/lesser curvature not involving the pylorus. Biopsies were taken on the mass and were positive for poorly differentiated carcinoma favoring adenocarcinoma and H. pylori. Had exploratory lap and port placement on 11/3/21. PET CT scan on 11/3/21 showed distal gastric wall thickening and FDG avid abdominal lymphadenopathy compatible with metastatic disease.  Now seeing oncology and was being treated with FOLFOX Pembro started in December 2021. Completed 5FU and now on Pembro. Also h/o DVT and on Xarelto now. \par \par In regards to Type 2 DM, no polyuria and no polydipsia. Not monitoring FS at home. Taking Metformin ER 1,000 mg daily.  UTD with ophthalmology (2022) and no retinopathy. Had foot pain and using inserts. Does have podiatrist. Taking ACE-I for proteinuria. For breakfast, no more bagels and has rye with egg whites and coffee with no sugar. For lunch: rye with salami. For dinner: Greek foods. No sodas or juices. Likes fruits at night with occasional sweets. Still smoking and drinks wine. Does lots of walking. \par \par In regards to thyroid disease, taking Synthroid 175 mcg daily. No neck complaints. Had unremarkable neck ultrasound on 12/4/19. Had total thyroidectomy and  I131 in 1999. No SOB or palpitations. \par \par In regards to vitamin D def, off MVI as per oncology

## 2022-10-11 NOTE — REVIEW OF SYSTEMS
[Recent Weight Gain (___ Lbs)] : recent [unfilled] ~Ulb weight gain [Pain/Numbness of Digits] : pain/numbness of digits [Negative] : Respiratory [All other systems negative] : All other systems negative [Recent Weight Loss (___ Lbs)] : no recent weight loss [Nausea] : no nausea [Vomiting] : no vomiting was observed [Constipation] : no constipation [Diarrhea] : no diarrhea [Abdominal Pain] : no abdominal pain [Polydipsia] : no polydipsia [Swelling] : no swelling

## 2022-10-11 NOTE — PHYSICAL EXAM
[Alert] : alert [No Acute Distress] : no acute distress [Normal Sclera/Conjunctiva] : normal sclera/conjunctiva [EOMI] : extra ocular movement intact [No LAD] : no lymphadenopathy [Well Healed Scar] : well healed scar [No Respiratory Distress] : no respiratory distress [Clear to Auscultation] : lungs were clear to auscultation bilaterally [Normal S1, S2] : normal S1 and S2 [Regular Rhythm] : with a regular rhythm [No Edema] : no peripheral edema [Pedal Pulses Normal] : the pedal pulses are present [Normal Bowel Sounds] : normal bowel sounds [Not Tender] : non-tender [Not Distended] : not distended [Soft] : abdomen soft [Normal Anterior Cervical Nodes] : no anterior cervical lymphadenopathy [No Clubbing, Cyanosis] : no clubbing  or cyanosis of the fingernails [No Rash] : no rash [Right foot was examined, including] : right foot ~C was examined, including visual inspection with sensory and pulse exams [Left foot was examined, including] : left foot ~C was examined, including visual inspection with sensory and pulse exams [2+] : 2+ in the dorsalis pedis [Diminished Throughout Both Feet] : diminished tactile sensation with monofilament testing throughout both feet [Normal Reflexes] : deep tendon reflexes were 2+ and symmetric [Normal Affect] : the affect was normal [Normal Mood] : the mood was normal [FreeTextEntry1] : callus [FreeTextEntry2] : tinea pedis [FreeTextEntry5] : callus [FreeTextEntry6] : tinea pedis

## 2022-10-11 NOTE — ASSESSMENT
[Carbohydrate Consistent Diet] : carbohydrate consistent diet [Diabetes Foot Care] : diabetes foot care [Long Term Vascular Complications] : long term vascular complications of diabetes [Smoking Cessation] : smoking cessation [FreeTextEntry1] : 66 y.o. male with h/o Type 2 DM, hypothyroidism s/p surgery and I131 for papillary thyroid cancer. \par \par 1. Type 2 DM- Suboptimal control with Hba1c of 8.3% today. Encouraged a carbohydrate consistent diet and exercise as tolerated. Needs to limit fruit intake. Will increase Metformin ER to 2,000 mg daily. Recommend SMBG. Reviewed blood glucose targets. Stable urine alb/cr ratio in 12/2021 and will repeat today.\par \par 2. BP is at goal and will continue Lisinopril for renal protection\par \par 3. Hyperlipidemia- Will check lipids and discussed statin for CV risk reduction. Patient has declined statins in the past.\par \par 4. Hypothyroidism/papillary thyroid cancer- Clinically stable. Goal TSH is below 2.5 and no need for TSH suppression. Will adjust Synthroid accordingly. UTD with neck ultrasound in December 2019 was unremarkable and will repeat ultrasound in 5 years. PET CT scan in 12/2021 did not show any pathology in the head or neck region. Discussed concerns of immune checkpoint inhibitors and endocrinopathies. Will need close monitoring of TFTs. \par \par 5. Vitamin D def- Will check 25 vitamin D level and will supplement if needed\par \par If stable, follow up in 4 months \par Follow up with podiatry and recommend treatment of tinea pedia

## 2022-10-12 DIAGNOSIS — Z51.11 ENCOUNTER FOR ANTINEOPLASTIC CHEMOTHERAPY: ICD-10-CM

## 2022-10-12 LAB — T4 FREE SERPL-MCNC: 1.7 NG/DL — SIGNIFICANT CHANGE UP (ref 0.9–1.8)

## 2022-10-14 LAB
25(OH)D3 SERPL-MCNC: 20.5 NG/ML
CHOLEST SERPL-MCNC: 185 MG/DL
CREAT SPEC-SCNC: 70 MG/DL
FOLATE SERPL-MCNC: >20 NG/ML
HDLC SERPL-MCNC: 41 MG/DL
LDLC SERPL CALC-MCNC: 110 MG/DL
MICROALBUMIN 24H UR DL<=1MG/L-MCNC: 5.4 MG/DL
MICROALBUMIN/CREAT 24H UR-RTO: 76 MG/G
NONHDLC SERPL-MCNC: 144 MG/DL
T4 FREE SERPL-MCNC: 1.9 NG/DL
THYROGLOB AB SERPL-ACNC: <20 IU/ML
THYROGLOB SERPL-MCNC: <0.2 NG/ML
TRIGL SERPL-MCNC: 173 MG/DL
TSH SERPL-ACNC: 0.01 UIU/ML
VIT B12 SERPL-MCNC: 346 PG/ML

## 2022-10-17 ENCOUNTER — APPOINTMENT (OUTPATIENT)
Dept: INFUSION THERAPY | Facility: HOSPITAL | Age: 67
End: 2022-10-17

## 2022-10-17 ENCOUNTER — NON-APPOINTMENT (OUTPATIENT)
Age: 67
End: 2022-10-17

## 2022-10-19 ENCOUNTER — APPOINTMENT (OUTPATIENT)
Dept: INFUSION THERAPY | Facility: HOSPITAL | Age: 67
End: 2022-10-19

## 2022-10-31 ENCOUNTER — APPOINTMENT (OUTPATIENT)
Dept: INFUSION THERAPY | Facility: HOSPITAL | Age: 67
End: 2022-10-31

## 2022-11-01 ENCOUNTER — RESULT REVIEW (OUTPATIENT)
Age: 67
End: 2022-11-01

## 2022-11-02 ENCOUNTER — APPOINTMENT (OUTPATIENT)
Dept: INFUSION THERAPY | Facility: HOSPITAL | Age: 67
End: 2022-11-02

## 2022-11-14 ENCOUNTER — APPOINTMENT (OUTPATIENT)
Dept: INFUSION THERAPY | Facility: HOSPITAL | Age: 67
End: 2022-11-14

## 2022-11-16 ENCOUNTER — APPOINTMENT (OUTPATIENT)
Dept: INFUSION THERAPY | Facility: HOSPITAL | Age: 67
End: 2022-11-16

## 2022-11-21 ENCOUNTER — RESULT REVIEW (OUTPATIENT)
Age: 67
End: 2022-11-21

## 2022-11-21 ENCOUNTER — APPOINTMENT (OUTPATIENT)
Dept: INFUSION THERAPY | Facility: HOSPITAL | Age: 67
End: 2022-11-21

## 2022-11-21 ENCOUNTER — RX RENEWAL (OUTPATIENT)
Age: 67
End: 2022-11-21

## 2022-11-21 LAB
ALBUMIN SERPL ELPH-MCNC: 4.1 G/DL — SIGNIFICANT CHANGE UP (ref 3.3–5)
ALP SERPL-CCNC: 73 U/L — SIGNIFICANT CHANGE UP (ref 40–120)
ALT FLD-CCNC: 26 U/L — SIGNIFICANT CHANGE UP (ref 10–45)
ANION GAP SERPL CALC-SCNC: 12 MMOL/L — SIGNIFICANT CHANGE UP (ref 5–17)
AST SERPL-CCNC: 28 U/L — SIGNIFICANT CHANGE UP (ref 10–40)
BASOPHILS # BLD AUTO: 0.02 K/UL — SIGNIFICANT CHANGE UP (ref 0–0.2)
BASOPHILS NFR BLD AUTO: 0.4 % — SIGNIFICANT CHANGE UP (ref 0–2)
BILIRUB SERPL-MCNC: 0.4 MG/DL — SIGNIFICANT CHANGE UP (ref 0.2–1.2)
BUN SERPL-MCNC: 20 MG/DL — SIGNIFICANT CHANGE UP (ref 7–23)
CALCIUM SERPL-MCNC: 8.9 MG/DL — SIGNIFICANT CHANGE UP (ref 8.4–10.5)
CEA SERPL-MCNC: 2.5 NG/ML — SIGNIFICANT CHANGE UP (ref 0–3.8)
CHLORIDE SERPL-SCNC: 105 MMOL/L — SIGNIFICANT CHANGE UP (ref 96–108)
CO2 SERPL-SCNC: 24 MMOL/L — SIGNIFICANT CHANGE UP (ref 22–31)
CREAT SERPL-MCNC: 0.84 MG/DL — SIGNIFICANT CHANGE UP (ref 0.5–1.3)
EGFR: 96 ML/MIN/1.73M2 — SIGNIFICANT CHANGE UP
EOSINOPHIL # BLD AUTO: 0.31 K/UL — SIGNIFICANT CHANGE UP (ref 0–0.5)
EOSINOPHIL NFR BLD AUTO: 6.1 % — HIGH (ref 0–6)
GLUCOSE SERPL-MCNC: 148 MG/DL — HIGH (ref 70–99)
HCT VFR BLD CALC: 39.4 % — SIGNIFICANT CHANGE UP (ref 39–50)
HGB BLD-MCNC: 13.1 G/DL — SIGNIFICANT CHANGE UP (ref 13–17)
IMM GRANULOCYTES NFR BLD AUTO: 0.2 % — SIGNIFICANT CHANGE UP (ref 0–0.9)
LYMPHOCYTES # BLD AUTO: 1.51 K/UL — SIGNIFICANT CHANGE UP (ref 1–3.3)
LYMPHOCYTES # BLD AUTO: 29.8 % — SIGNIFICANT CHANGE UP (ref 13–44)
MCHC RBC-ENTMCNC: 28.7 PG — SIGNIFICANT CHANGE UP (ref 27–34)
MCHC RBC-ENTMCNC: 33.2 G/DL — SIGNIFICANT CHANGE UP (ref 32–36)
MCV RBC AUTO: 86.4 FL — SIGNIFICANT CHANGE UP (ref 80–100)
MONOCYTES # BLD AUTO: 0.49 K/UL — SIGNIFICANT CHANGE UP (ref 0–0.9)
MONOCYTES NFR BLD AUTO: 9.7 % — SIGNIFICANT CHANGE UP (ref 2–14)
NEUTROPHILS # BLD AUTO: 2.73 K/UL — SIGNIFICANT CHANGE UP (ref 1.8–7.4)
NEUTROPHILS NFR BLD AUTO: 53.8 % — SIGNIFICANT CHANGE UP (ref 43–77)
NRBC # BLD: 0 /100 WBCS — SIGNIFICANT CHANGE UP (ref 0–0)
PLATELET # BLD AUTO: 278 K/UL — SIGNIFICANT CHANGE UP (ref 150–400)
POTASSIUM SERPL-MCNC: 4.1 MMOL/L — SIGNIFICANT CHANGE UP (ref 3.5–5.3)
POTASSIUM SERPL-SCNC: 4.1 MMOL/L — SIGNIFICANT CHANGE UP (ref 3.5–5.3)
PROT SERPL-MCNC: 6.7 G/DL — SIGNIFICANT CHANGE UP (ref 6–8.3)
RBC # BLD: 4.56 M/UL — SIGNIFICANT CHANGE UP (ref 4.2–5.8)
RBC # FLD: 13.3 % — SIGNIFICANT CHANGE UP (ref 10.3–14.5)
SODIUM SERPL-SCNC: 141 MMOL/L — SIGNIFICANT CHANGE UP (ref 135–145)
T4 FREE SERPL-MCNC: 1.7 NG/DL — SIGNIFICANT CHANGE UP (ref 0.9–1.8)
T4 FREE+ TSH PNL SERPL: 0.01 UIU/ML — LOW (ref 0.27–4.2)
WBC # BLD: 5.07 K/UL — SIGNIFICANT CHANGE UP (ref 3.8–10.5)
WBC # FLD AUTO: 5.07 K/UL — SIGNIFICANT CHANGE UP (ref 3.8–10.5)

## 2022-11-28 ENCOUNTER — OUTPATIENT (OUTPATIENT)
Dept: OUTPATIENT SERVICES | Facility: HOSPITAL | Age: 67
LOS: 1 days | End: 2022-11-28
Payer: MEDICARE

## 2022-11-28 ENCOUNTER — APPOINTMENT (OUTPATIENT)
Dept: NUCLEAR MEDICINE | Facility: IMAGING CENTER | Age: 67
End: 2022-11-28

## 2022-11-28 DIAGNOSIS — E89.0 POSTPROCEDURAL HYPOTHYROIDISM: Chronic | ICD-10-CM

## 2022-11-28 DIAGNOSIS — C16.9 MALIGNANT NEOPLASM OF STOMACH, UNSPECIFIED: ICD-10-CM

## 2022-11-28 DIAGNOSIS — Z98.890 OTHER SPECIFIED POSTPROCEDURAL STATES: Chronic | ICD-10-CM

## 2022-11-28 PROCEDURE — A9552: CPT

## 2022-11-28 PROCEDURE — 78815 PET IMAGE W/CT SKULL-THIGH: CPT

## 2022-11-28 PROCEDURE — 78815 PET IMAGE W/CT SKULL-THIGH: CPT | Mod: 26,PS,KX,MH

## 2022-11-30 ENCOUNTER — APPOINTMENT (OUTPATIENT)
Dept: HEMATOLOGY ONCOLOGY | Facility: CLINIC | Age: 67
End: 2022-11-30

## 2022-11-30 VITALS
HEART RATE: 80 BPM | SYSTOLIC BLOOD PRESSURE: 107 MMHG | HEIGHT: 67.01 IN | WEIGHT: 207.23 LBS | BODY MASS INDEX: 32.53 KG/M2 | DIASTOLIC BLOOD PRESSURE: 65 MMHG | RESPIRATION RATE: 16 BRPM | OXYGEN SATURATION: 94 % | TEMPERATURE: 98.1 F

## 2022-11-30 PROCEDURE — 99214 OFFICE O/P EST MOD 30 MIN: CPT

## 2022-11-30 NOTE — PHYSICAL EXAM
[Fully active, able to carry on all pre-disease performance without restriction] : Status 0 - Fully active, able to carry on all pre-disease performance without restriction [Normal] : affect appropriate [de-identified] : anicteric  [de-identified] : no JVD; no neck swelling [de-identified] : normal respiratory effort, no audible wheeze [de-identified] : reg rate  [de-identified] : non distended

## 2022-11-30 NOTE — HISTORY OF PRESENT ILLNESS
[Disease: _____________________] : Disease: [unfilled] [AJCC Stage: ____] : AJCC Stage: [unfilled] [de-identified] : Patient is a 65 y/o M with known PMHx of hypothyroidism secondary to thyroidectomy from previous thyroid CA and NIDDM who first presented Dr. Cox c/o of epigastric pain made worse with eating since 5/2021 that patient thought was due to drinking too much coffee.  An EGD was performed on 10/19/21 that showed a large ulcerated mass in the gastric antrum/lesser curvature not involving the pylorus.  Biopsies were taken on the mass and were positive for poorly differentiated carcinoma favoring adenocarcinoma and H. pylori.  The patient was then sent for a CT A/P with contrast that showed a distal gastric mass with appearance of transmural extension along with adjacent perigastric lymphadenopathy and upper retroperitoneal lymphadenopathy.  The patient met with Dr. Maloney on 10/26/21 who ordered a CT Chest and a PET scan.  CT Chest on 10/27/21 showed no intrathoracic metastatic disease.  PET scan is scheduled for 11/2/21.  Plan is for ex-lap with Dr. Maloney with Port placement on 11/3/21.  The patient is here today to establish oncologic care.  The patient states that since starting triple therapy for H. pylori, his abdominal pain has improved.  Denies weight loss, anorexia, N/V, diarrhea, constipation, rectal bleeding or melena.\par  Stage IV disease \par MS-H --> FOLFOX Pembro starting Dec 2021 [de-identified] : poorly differentiated carcinoma [de-identified] : CEA [de-identified] : her2-\par CPS >1\par MSI-High\par TMB - 21 Muts/Mb\par \par KIT R177H\par PTEN K267fs*9, T319fs*1\par APC D6644ma*29\par ASXL1 G645fs*58\par CIC I5248ac*91\par CTNNA1 N738fs*53\par EPHB4 amplification\par MLH1 loss\par PBRM1 H7711dx*102\par SMAD4 R361H\par TP53 R248W  [FreeTextEntry1] : FLOT started 11/11/21 x 1 --> FOLFOX +pembrolizumab--> 5FU Pembro [de-identified] : + notes MCCALLUM , hasn't d/w PMD \par no diarrhea or rash \par tolerating pembro

## 2022-12-27 ENCOUNTER — OUTPATIENT (OUTPATIENT)
Dept: OUTPATIENT SERVICES | Facility: HOSPITAL | Age: 67
LOS: 1 days | Discharge: HOME | End: 2022-12-27

## 2022-12-27 DIAGNOSIS — Z98.890 OTHER SPECIFIED POSTPROCEDURAL STATES: Chronic | ICD-10-CM

## 2022-12-27 DIAGNOSIS — C16.9 MALIGNANT NEOPLASM OF STOMACH, UNSPECIFIED: ICD-10-CM

## 2022-12-27 DIAGNOSIS — E89.0 POSTPROCEDURAL HYPOTHYROIDISM: Chronic | ICD-10-CM

## 2023-01-04 ENCOUNTER — APPOINTMENT (OUTPATIENT)
Dept: INFUSION THERAPY | Facility: HOSPITAL | Age: 68
End: 2023-01-04

## 2023-01-04 ENCOUNTER — APPOINTMENT (OUTPATIENT)
Dept: HEMATOLOGY ONCOLOGY | Facility: CLINIC | Age: 68
End: 2023-01-04
Payer: MEDICARE

## 2023-01-04 ENCOUNTER — RESULT REVIEW (OUTPATIENT)
Age: 68
End: 2023-01-04

## 2023-01-04 VITALS
OXYGEN SATURATION: 97 % | BODY MASS INDEX: 32.7 KG/M2 | DIASTOLIC BLOOD PRESSURE: 71 MMHG | RESPIRATION RATE: 16 BRPM | WEIGHT: 208.34 LBS | HEIGHT: 67.01 IN | TEMPERATURE: 98.2 F | SYSTOLIC BLOOD PRESSURE: 110 MMHG | HEART RATE: 87 BPM

## 2023-01-04 LAB
ALBUMIN SERPL ELPH-MCNC: 4.2 G/DL — SIGNIFICANT CHANGE UP (ref 3.3–5)
ALP SERPL-CCNC: 69 U/L — SIGNIFICANT CHANGE UP (ref 40–120)
ALT FLD-CCNC: 14 U/L — SIGNIFICANT CHANGE UP (ref 10–45)
ANION GAP SERPL CALC-SCNC: 12 MMOL/L — SIGNIFICANT CHANGE UP (ref 5–17)
AST SERPL-CCNC: 18 U/L — SIGNIFICANT CHANGE UP (ref 10–40)
BASOPHILS # BLD AUTO: 0.04 K/UL — SIGNIFICANT CHANGE UP (ref 0–0.2)
BASOPHILS NFR BLD AUTO: 0.7 % — SIGNIFICANT CHANGE UP (ref 0–2)
BILIRUB SERPL-MCNC: 0.3 MG/DL — SIGNIFICANT CHANGE UP (ref 0.2–1.2)
BUN SERPL-MCNC: 16 MG/DL — SIGNIFICANT CHANGE UP (ref 7–23)
CALCIUM SERPL-MCNC: 9.2 MG/DL — SIGNIFICANT CHANGE UP (ref 8.4–10.5)
CEA SERPL-MCNC: 2.5 NG/ML — SIGNIFICANT CHANGE UP (ref 0–3.8)
CHLORIDE SERPL-SCNC: 100 MMOL/L — SIGNIFICANT CHANGE UP (ref 96–108)
CO2 SERPL-SCNC: 26 MMOL/L — SIGNIFICANT CHANGE UP (ref 22–31)
CREAT SERPL-MCNC: 0.87 MG/DL — SIGNIFICANT CHANGE UP (ref 0.5–1.3)
EGFR: 95 ML/MIN/1.73M2 — SIGNIFICANT CHANGE UP
EOSINOPHIL # BLD AUTO: 0.47 K/UL — SIGNIFICANT CHANGE UP (ref 0–0.5)
EOSINOPHIL NFR BLD AUTO: 8 % — HIGH (ref 0–6)
GLUCOSE SERPL-MCNC: 167 MG/DL — HIGH (ref 70–99)
HCT VFR BLD CALC: 38.5 % — LOW (ref 39–50)
HGB BLD-MCNC: 12.8 G/DL — LOW (ref 13–17)
IMM GRANULOCYTES NFR BLD AUTO: 0.2 % — SIGNIFICANT CHANGE UP (ref 0–0.9)
LYMPHOCYTES # BLD AUTO: 1.61 K/UL — SIGNIFICANT CHANGE UP (ref 1–3.3)
LYMPHOCYTES # BLD AUTO: 27.4 % — SIGNIFICANT CHANGE UP (ref 13–44)
MCHC RBC-ENTMCNC: 28 PG — SIGNIFICANT CHANGE UP (ref 27–34)
MCHC RBC-ENTMCNC: 33.2 G/DL — SIGNIFICANT CHANGE UP (ref 32–36)
MCV RBC AUTO: 84.2 FL — SIGNIFICANT CHANGE UP (ref 80–100)
MONOCYTES # BLD AUTO: 0.75 K/UL — SIGNIFICANT CHANGE UP (ref 0–0.9)
MONOCYTES NFR BLD AUTO: 12.8 % — SIGNIFICANT CHANGE UP (ref 2–14)
NEUTROPHILS # BLD AUTO: 2.99 K/UL — SIGNIFICANT CHANGE UP (ref 1.8–7.4)
NEUTROPHILS NFR BLD AUTO: 50.9 % — SIGNIFICANT CHANGE UP (ref 43–77)
NRBC # BLD: 0 /100 WBCS — SIGNIFICANT CHANGE UP (ref 0–0)
PLATELET # BLD AUTO: 199 K/UL — SIGNIFICANT CHANGE UP (ref 150–400)
POTASSIUM SERPL-MCNC: 4 MMOL/L — SIGNIFICANT CHANGE UP (ref 3.5–5.3)
POTASSIUM SERPL-SCNC: 4 MMOL/L — SIGNIFICANT CHANGE UP (ref 3.5–5.3)
PROT SERPL-MCNC: 6.6 G/DL — SIGNIFICANT CHANGE UP (ref 6–8.3)
RBC # BLD: 4.57 M/UL — SIGNIFICANT CHANGE UP (ref 4.2–5.8)
RBC # FLD: 13 % — SIGNIFICANT CHANGE UP (ref 10.3–14.5)
SODIUM SERPL-SCNC: 138 MMOL/L — SIGNIFICANT CHANGE UP (ref 135–145)
T4 FREE SERPL-MCNC: 1.2 NG/DL — SIGNIFICANT CHANGE UP (ref 0.9–1.8)
T4 FREE+ TSH PNL SERPL: 0.02 UIU/ML — LOW (ref 0.27–4.2)
WBC # BLD: 5.87 K/UL — SIGNIFICANT CHANGE UP (ref 3.8–10.5)
WBC # FLD AUTO: 5.87 K/UL — SIGNIFICANT CHANGE UP (ref 3.8–10.5)

## 2023-01-04 PROCEDURE — 99214 OFFICE O/P EST MOD 30 MIN: CPT

## 2023-01-05 DIAGNOSIS — Z51.11 ENCOUNTER FOR ANTINEOPLASTIC CHEMOTHERAPY: ICD-10-CM

## 2023-01-05 NOTE — PHYSICAL EXAM
[Fully active, able to carry on all pre-disease performance without restriction] : Status 0 - Fully active, able to carry on all pre-disease performance without restriction [Normal] : affect appropriate [de-identified] : anicteric  [de-identified] : no JVD; no neck swelling [de-identified] : normal respiratory effort, no audible wheeze [de-identified] : reg rate  [de-identified] : non distended

## 2023-01-05 NOTE — REVIEW OF SYSTEMS
[Negative] : Allergic/Immunologic [SOB on Exertion] : shortness of breath during exertion [Dysphagia] : no dysphagia [Hoarseness] : no hoarseness [Odynophagia] : no odynophagia [Shortness Of Breath] : no shortness of breath [Wheezing] : no wheezing [Cough] : no cough [Abdominal Pain] : no abdominal pain [Vomiting] : no vomiting [Diarrhea] : no diarrhea

## 2023-01-23 NOTE — ASSESSMENT
[facility]    Medicine Progress Note - Hospitalist Service        Date of Admission:  1/6/2023  2:22 PM    Assessment & Plan:   Miley Tolbert is a 79 year old female with history of hypertension, CHF, atrial fibrillation on Xarelto, history of for lower extremity DVT, coronary artery disease, chronic kidney disease stage III, COPD, hypothyroidism, poorly controlled type 2 diabetes with hyperglycemia, GERD, fecal and urinary incontinence who presented with progressively worsening sacral decubitus ulcer and inability of the family to provide care for the patient.       Progressively worsening stage IV sacral wound and stage II left calf and heel wounds.  Sepsis ruled out.  -Reportedly wound was foul smelling, soiled with urine/stool.  -Presumed sacral wound infection and started on broad spectrum abx, although does not appear infected.    -Procal and CRP mildly elevated. Lt heel XR without osteomyelitis.    -Podiatry consulted, ultrasound Doppler study shows VENUS within normal limits.  No intervention needed, signed off.  -CT abdomen pelvis and right thigh obtained to evaluate for other potential sources including diverticulitis/cholecystitis and also to look for any hematoma given ongoing drop in hemoglobin.  No source of infection, Zosyn discontinued.    -Low-grade fever overnight, monitor for now  -Wound care consulted and following, appreciate assistance.   - and care coordination consulted for discharge planning, will need placement to geriatric unit as daughter unable to care for her and unsafe to be discharged back home  -Continue oxycodone at every 6 hours as needed    Poorly controlled type 2 diabetes mellitus with peripheral neuropathy and hypoglycemic spells  -HbA1C 10 on 1/6/2023  -Prior to admission on Lantus 35 units and 10 to 14 units of aspart prior to meals at home.  -Hold prior to admission Januvia  -Due to intermittent spells of hypoglycemia Lantus decreased to  [Carbohydrate Consistent Diet] : carbohydrate consistent diet 18 units at bedtime on 1/17  -Continues on mealtime NovoLog at 10 units with breakfast, 10 units with lunch and 6 units with supper  -Medium intensity sliding scale  -Blood sugar adequately controlled on current regimen.    Poor fitting dentures  Epulis fissuratum  Complaints of painful mouth   -Poor fitting dentures.    -Previous hospitalist discussed with oral surgery on 01/18/23, regarding extra tissue (epulis fissuratum) that often occurs in patients with poor fitting dentures. It should be excised and she should have dentures refitted.  Care transition team consulted to help expedite referral to dentist/oral surgery following discharge.   -Lidocaine viscous as needed  -Soft diet     Paroxysmal atrial fibrillation  -Continue PTA Xarelto      Progressive dementia with behavioral disturbance in the setting of schizoaffective disorder and intermittent delirium  -Maintain sleep-wake cycles.  -Avoid benzodiazepines.  -Continue PTA venlafaxine 150 mg daily, topiramate 100 mg daily    CAD with HFpEF  Hypertension  Hyperlipidemia  * Last echo on 5/5/2022 showed EF of 55 to 60% with normal left ventricular cavity size.   -Lisinopril held in the setting of hypotension and resumed at lower dose subsequently as BP trended up.   -Chest pain RRT 1/15. Serial troponin negative. EKG without acute ischemic findings. Symptoms resolved, unclear if with nitroglycerine or spontaneously.   -Currently asymptomatic  -If recurrent pain, will get NM stress test given underlying known CAD.  No further issues since the RRT.  -Continue atorvastatin 40 daily     History of DVT  -Continue Xarelto.     Chronic pain  -Continue tylenol, Zanaflex. As needed oxycodone as needed for pain control.  -Lt knee XR showed moderate to severe tricompartmental osteoarthritic changes.  No fracture or effusion.      Urinary incontinence  Urinary retention, resolved  -Continue tamsulosin 0.4 mg daily.  -Espino catheter has been discontinued, currently on  [Diabetes Foot Care] : diabetes foot care "external urinary catheter.     Diet: Combination Diet Moderate Consistent Carb (60 g CHO per Meal) Diet; Mechanical/Dental Soft Diet     DVT Prophylaxis: DOAC   Espino Catheter: Not present  Code Status: No CPR- Do NOT Intubate     Disposition Plan      Expected Discharge Date: 01/25/2023    Discharge Delays: Placement - LTC  *Medically Ready for Discharge    Discharge Comments: placement; was living with daughter; has multiple wounds.   SW/CC-- needs placement.  LTC      Entered: Hank Herring MD 01/23/2023, 9:58 AM        Clinically Significant Risk Factors              # Hypoalbuminemia: Lowest albumin = 2.4 g/dL at 1/8/2023  7:01 AM, will monitor as appropriate          # DMII: A1C = 10.0 % (Ref range: 0.0 - 5.6 %) within past 3 months   # Overweight: Estimated body mass index is 29.18 kg/m  as calculated from the following:    Height as of this encounter: 1.626 m (5' 4\").    Weight as of this encounter: 77.1 kg (170 lb).            The patient's care was discussed with the Bedside Nurse and Patient.    Hank Herring MD  Hospitalist Service  [facility]  Text Page 7AM-6PM  Securely message with the Vocera Web Console (learn more here)  Text page via [employer] Paging/Directory    ______________________________________________________________________    Interval History   No acute events overnight.  Blood sugars well controlled.  Complains of intermittent pain which appears to be more or less controlled.  No plans for narcotic escalation at this time.  Noted low-grade fever overnight    Data reviewed today: I reviewed all medications, new labs and imaging results over the last 24 hours. I personally reviewed no images or EKG's today.    Physical Exam   Vital signs:  Temp: 99.2  F (37.3  C) Temp src: Oral BP: (!) 151/56 Pulse: 86   Resp: 18 SpO2: 99 % O2 Device: None (Room air)   Height: 162.6 cm (5' 4\") Weight: 77.1 kg (170 lb) (removed pulsate power supply off bed and weighed pt; needs " [Long Term Vascular Complications] : long term vascular complications of diabetes "to be zero'd)  Estimated body mass index is 29.18 kg/m  as calculated from the following:    Height as of this encounter: 1.626 m (5' 4\").    Weight as of this encounter: 77.1 kg (170 lb).      Wt Readings from Last 2 Encounters:   01/21/23 77.1 kg (170 lb)       Gen: AAOX3, NAD  Resp: CTA B/L, normal WOB  CVS: RRR, no murmur  Abd/GI: Soft, non-tender. BS- normoactive.    Skin: Warm, dry no rashes  MSK: boots in place-bilateral lower extremity.  Neuro- CN- intact.       Data   Recent Labs   Lab 01/23/23  0905 01/23/23  0159 01/22/23  2139 01/22/23  0807 01/22/23  0643 01/21/23  0839 01/21/23  0753 01/20/23  1154 01/20/23  0722 01/18/23  0747 01/18/23  0550   WBC  --   --   --   --   --   --   --   --  6.0  --   --    HGB  --   --   --   --   --   --   --   --  8.9*  --   --    MCV  --   --   --   --   --   --   --   --  86  --   --    PLT  --   --   --   --   --   --   --   --  252  --   --    POTASSIUM  --   --   --   --  4.0  --  4.0  --  4.0   < > 3.9   CR  --   --   --   --   --   --   --   --   --   --  0.74   * 153* 191*   < >  --    < >  --    < > 156*   < >  --     < > = values in this interval not displayed.       No results found for this or any previous visit (from the past 24 hour(s)).  Medications       artificial saliva  2 spray Swish & Spit 4x Daily     atorvastatin  40 mg Oral Daily     carboxymethylcellulose PF  1 drop Both Eyes 4x Daily     gabapentin  600 mg Oral TID     insulin aspart  6 Units Subcutaneous Daily with supper     insulin aspart  10 Units Subcutaneous Daily with breakfast     insulin aspart  10 Units Subcutaneous Daily with lunch     insulin aspart  1-7 Units Subcutaneous TID AC     insulin aspart  1-5 Units Subcutaneous At Bedtime     insulin glargine  18 Units Subcutaneous At Bedtime     levothyroxine  175 mcg Oral QAM AC     lisinopril  20 mg Oral Daily     mirabegron  25 mg Oral At Bedtime     rivaroxaban ANTICOAGULANT  20 mg Oral Daily with breakfast     sennosides  8.6 " [Smoking Cessation] : smoking cessation [FreeTextEntry1] : 65 y.o. male with h/o Type 2 DM, hypothyroidism s/p surgery and I131 for papillary thyroid cancer. \par \par 1. Type 2 DM- Good control with Hba1c of 6.9% today. Encouraged carbohydrate consistent diet and exercise. Needs to limit fruit intake. Will continue Metformin ER 1,000 mg daily and stress compliance. Recommend SMBG. Reviewed blood glucose targets.  Will check urine microalb/cr ratio and CMP.\par \par 2. BP is at goal and will continue Lisinopril for renal protection\par \par 3. Will check lipids and discussed statin for CV risk reduction. Patient declines statin at this time.\par \par 4. Hypothyroidism/papillary thyroid cancer- Clinically stable. Will check TFTs and Tg level. Goal TSH is below 1.5 to 2.0. Will adjust Synthroid accordingly. UTD with neck ultrasound in December 2019 was unremarkable and will repeat ultrasound in 5 years. \par \par 5. Vitamin D def- Will check 25 vitamin D level and adjust supplement if needed\par \par If stable, follow up in 6 months \par Follow up with podiatry and recommend treatment of tinea pedis\par Labs drawn in the office mg Oral Every Other Day     sodium chloride (PF)  10 mL Intracatheter Q8H     tamsulosin  0.4 mg Oral At Bedtime     topiramate  100 mg Oral Daily     venlafaxine  150 mg Oral Daily

## 2023-02-15 ENCOUNTER — RESULT REVIEW (OUTPATIENT)
Age: 68
End: 2023-02-15

## 2023-02-15 ENCOUNTER — APPOINTMENT (OUTPATIENT)
Dept: INFUSION THERAPY | Facility: HOSPITAL | Age: 68
End: 2023-02-15

## 2023-02-15 ENCOUNTER — APPOINTMENT (OUTPATIENT)
Dept: HEMATOLOGY ONCOLOGY | Facility: CLINIC | Age: 68
End: 2023-02-15
Payer: MEDICARE

## 2023-02-15 VITALS
HEART RATE: 84 BPM | DIASTOLIC BLOOD PRESSURE: 73 MMHG | TEMPERATURE: 97.7 F | OXYGEN SATURATION: 93 % | WEIGHT: 197.31 LBS | BODY MASS INDEX: 30.97 KG/M2 | HEIGHT: 67.01 IN | RESPIRATION RATE: 18 BRPM | SYSTOLIC BLOOD PRESSURE: 123 MMHG

## 2023-02-15 LAB
ALBUMIN SERPL ELPH-MCNC: 4.1 G/DL — SIGNIFICANT CHANGE UP (ref 3.3–5)
ALP SERPL-CCNC: 54 U/L — SIGNIFICANT CHANGE UP (ref 40–120)
ALT FLD-CCNC: 13 U/L — SIGNIFICANT CHANGE UP (ref 10–45)
ANION GAP SERPL CALC-SCNC: 18 MMOL/L — HIGH (ref 5–17)
AST SERPL-CCNC: 20 U/L — SIGNIFICANT CHANGE UP (ref 10–40)
BASOPHILS # BLD AUTO: 0.05 K/UL — SIGNIFICANT CHANGE UP (ref 0–0.2)
BASOPHILS NFR BLD AUTO: 1 % — SIGNIFICANT CHANGE UP (ref 0–2)
BILIRUB SERPL-MCNC: 0.4 MG/DL — SIGNIFICANT CHANGE UP (ref 0.2–1.2)
BUN SERPL-MCNC: 13 MG/DL — SIGNIFICANT CHANGE UP (ref 7–23)
CALCIUM SERPL-MCNC: 9.4 MG/DL — SIGNIFICANT CHANGE UP (ref 8.4–10.5)
CEA SERPL-MCNC: 2 NG/ML — SIGNIFICANT CHANGE UP (ref 0–3.8)
CHLORIDE SERPL-SCNC: 104 MMOL/L — SIGNIFICANT CHANGE UP (ref 96–108)
CO2 SERPL-SCNC: 20 MMOL/L — LOW (ref 22–31)
CREAT SERPL-MCNC: 0.92 MG/DL — SIGNIFICANT CHANGE UP (ref 0.5–1.3)
EGFR: 91 ML/MIN/1.73M2 — SIGNIFICANT CHANGE UP
EOSINOPHIL # BLD AUTO: 0.35 K/UL — SIGNIFICANT CHANGE UP (ref 0–0.5)
EOSINOPHIL NFR BLD AUTO: 6.7 % — HIGH (ref 0–6)
GLUCOSE SERPL-MCNC: 119 MG/DL — HIGH (ref 70–99)
HCT VFR BLD CALC: 34.7 % — LOW (ref 39–50)
HGB BLD-MCNC: 11.7 G/DL — LOW (ref 13–17)
IMM GRANULOCYTES NFR BLD AUTO: 0.2 % — SIGNIFICANT CHANGE UP (ref 0–0.9)
LYMPHOCYTES # BLD AUTO: 1.92 K/UL — SIGNIFICANT CHANGE UP (ref 1–3.3)
LYMPHOCYTES # BLD AUTO: 36.6 % — SIGNIFICANT CHANGE UP (ref 13–44)
MCHC RBC-ENTMCNC: 28.5 PG — SIGNIFICANT CHANGE UP (ref 27–34)
MCHC RBC-ENTMCNC: 33.7 G/DL — SIGNIFICANT CHANGE UP (ref 32–36)
MCV RBC AUTO: 84.4 FL — SIGNIFICANT CHANGE UP (ref 80–100)
MONOCYTES # BLD AUTO: 0.5 K/UL — SIGNIFICANT CHANGE UP (ref 0–0.9)
MONOCYTES NFR BLD AUTO: 9.5 % — SIGNIFICANT CHANGE UP (ref 2–14)
NEUTROPHILS # BLD AUTO: 2.41 K/UL — SIGNIFICANT CHANGE UP (ref 1.8–7.4)
NEUTROPHILS NFR BLD AUTO: 46 % — SIGNIFICANT CHANGE UP (ref 43–77)
NRBC # BLD: 0 /100 WBCS — SIGNIFICANT CHANGE UP (ref 0–0)
PLATELET # BLD AUTO: 256 K/UL — SIGNIFICANT CHANGE UP (ref 150–400)
POTASSIUM SERPL-MCNC: 4.1 MMOL/L — SIGNIFICANT CHANGE UP (ref 3.5–5.3)
POTASSIUM SERPL-SCNC: 4.1 MMOL/L — SIGNIFICANT CHANGE UP (ref 3.5–5.3)
PROT SERPL-MCNC: 6.7 G/DL — SIGNIFICANT CHANGE UP (ref 6–8.3)
RBC # BLD: 4.11 M/UL — LOW (ref 4.2–5.8)
RBC # FLD: 13.3 % — SIGNIFICANT CHANGE UP (ref 10.3–14.5)
SODIUM SERPL-SCNC: 141 MMOL/L — SIGNIFICANT CHANGE UP (ref 135–145)
T4 FREE SERPL-MCNC: 1.4 NG/DL — SIGNIFICANT CHANGE UP (ref 0.9–1.8)
T4 FREE+ TSH PNL SERPL: 0.05 UIU/ML — LOW (ref 0.27–4.2)
WBC # BLD: 5.24 K/UL — SIGNIFICANT CHANGE UP (ref 3.8–10.5)
WBC # FLD AUTO: 5.24 K/UL — SIGNIFICANT CHANGE UP (ref 3.8–10.5)

## 2023-02-15 PROCEDURE — 99214 OFFICE O/P EST MOD 30 MIN: CPT

## 2023-02-15 NOTE — HISTORY OF PRESENT ILLNESS
[Disease: _____________________] : Disease: [unfilled] [AJCC Stage: ____] : AJCC Stage: [unfilled] [de-identified] : Patient is a 65 y/o M with known PMHx of hypothyroidism secondary to thyroidectomy from previous thyroid CA and NIDDM who first presented Dr. Cox c/o of epigastric pain made worse with eating since 5/2021 that patient thought was due to drinking too much coffee.  An EGD was performed on 10/19/21 that showed a large ulcerated mass in the gastric antrum/lesser curvature not involving the pylorus.  Biopsies were taken on the mass and were positive for poorly differentiated carcinoma favoring adenocarcinoma and H. pylori.  The patient was then sent for a CT A/P with contrast that showed a distal gastric mass with appearance of transmural extension along with adjacent perigastric lymphadenopathy and upper retroperitoneal lymphadenopathy.  The patient met with Dr. Maloney on 10/26/21 who ordered a CT Chest and a PET scan.  CT Chest on 10/27/21 showed no intrathoracic metastatic disease.  PET scan is scheduled for 11/2/21.  Plan is for ex-lap with Dr. Maloney with Port placement on 11/3/21.  The patient is here today to establish oncologic care.  The patient states that since starting triple therapy for H. pylori, his abdominal pain has improved.  Denies weight loss, anorexia, N/V, diarrhea, constipation, rectal bleeding or melena.\par  Stage IV disease \par MS-H --> FOLFOX Pembro starting Dec 2021 [de-identified] : poorly differentiated carcinoma [de-identified] : CEA [de-identified] : her2-\par CPS >1\par MSI-High\par TMB - 21 Muts/Mb\par \par KIT R177H\par PTEN K267fs*9, T319fs*1\par APC L9065cp*29\par ASXL1 G645fs*58\par CIC R3744hm*91\par CTNNA1 N738fs*53\par EPHB4 amplification\par MLH1 loss\par PBRM1 K2299kj*102\par SMAD4 R361H\par TP53 R248W  [FreeTextEntry1] : FLOT started 11/11/21 x 1 --> FOLFOX +pembrolizumab--> 5FU Pembro [de-identified] : MCCALLUM is slightly improved, has not d/w PMD \par no diarrhea or rash \par tolerating pembro

## 2023-02-15 NOTE — PHYSICAL EXAM
[Fully active, able to carry on all pre-disease performance without restriction] : Status 0 - Fully active, able to carry on all pre-disease performance without restriction [Normal] : affect appropriate [de-identified] : anicteric  [de-identified] : no JVD; no neck swelling [de-identified] : normal respiratory effort, no audible wheeze [de-identified] : reg rate  [de-identified] : non distended

## 2023-02-26 ENCOUNTER — RX RENEWAL (OUTPATIENT)
Age: 68
End: 2023-02-26

## 2023-02-27 ENCOUNTER — RX RENEWAL (OUTPATIENT)
Age: 68
End: 2023-02-27

## 2023-02-27 ENCOUNTER — APPOINTMENT (OUTPATIENT)
Dept: ENDOCRINOLOGY | Facility: CLINIC | Age: 68
End: 2023-02-27
Payer: MEDICARE

## 2023-02-27 VITALS
TEMPERATURE: 98.1 F | OXYGEN SATURATION: 97 % | BODY MASS INDEX: 30.13 KG/M2 | WEIGHT: 192 LBS | SYSTOLIC BLOOD PRESSURE: 101 MMHG | HEIGHT: 67.01 IN | HEART RATE: 70 BPM | DIASTOLIC BLOOD PRESSURE: 63 MMHG

## 2023-02-27 LAB — HBA1C MFR BLD HPLC: 5.9

## 2023-02-27 PROCEDURE — 83036 HEMOGLOBIN GLYCOSYLATED A1C: CPT | Mod: QW

## 2023-02-27 PROCEDURE — 99214 OFFICE O/P EST MOD 30 MIN: CPT | Mod: 25

## 2023-02-27 NOTE — HISTORY OF PRESENT ILLNESS
[FreeTextEntry1] : 67 y.o. male with h/o hypothyroidism s/p surgery for papillary thyroid cancer and Type 2 DM here for follow up visit. Patient saw GI, Dr. Bee with c/o epigastric pain made worse when eating. Had EGD on 10/19/21 showing a large ulcerated mass in the gastric antrum/lesser curvature not involving the pylorus. Biopsies were taken on the mass and were positive for poorly differentiated carcinoma favoring adenocarcinoma and H. pylori. Had exploratory lap and port placement on 11/3/21. PET CT scan on 11/3/21 showed distal gastric wall thickening and FDG avid abdominal lymphadenopathy compatible with metastatic disease.  Now seeing oncology and was being treated with FOLFOX Pembro started in December 2021. Completed 5FU and now on Pembro. Also h/o DVT and on Xarelto now. \par \par In regards to Type 2 DM, no polyuria and no polydipsia. Not monitoring FS at home. Taking Metformin ER 2,000 mg daily.  UTD with ophthalmology (2/27/23) with mild nonproliferative retinopathy and dry eyes. Had foot pain and using inserts. Does have podiatrist. Taking ACE-I for proteinuria. For breakfast, no more bagels and has rye with egg whites and coffee with no sugar. For lunch: rye with salami. For dinner: Greek foods. No sodas or juices. Likes fruits at night with occasional sweets. Still smoking and drinks wine. Does lots of walking. \par \par In regards to thyroid disease, taking Synthroid 175 mcg daily 6 days per week but may forget to skip and does take it 7 days per week sometimes. No neck complaints. Had unremarkable neck ultrasound on 12/4/19. Had total thyroidectomy and  I131 in 1999. No SOB or palpitations. \par \par In regards to vitamin D def, off MVI as per oncology

## 2023-02-27 NOTE — ASSESSMENT
[Carbohydrate Consistent Diet] : carbohydrate consistent diet [Diabetes Foot Care] : diabetes foot care [Long Term Vascular Complications] : long term vascular complications of diabetes [Smoking Cessation] : smoking cessation [FreeTextEntry1] : 67 y.o. male with h/o Type 2 DM, hypothyroidism s/p surgery and I131 for papillary thyroid cancer. \par \par 1. Type 2 DM- Good control with Hba1c of 5.9% today. Encouraged a carbohydrate consistent diet and exercise as tolerated. Needs to limit fruit intake. Will decrease Metformin ER to 1,500 mg daily. Recommend SMBG. Reviewed blood glucose targets. Stable urine alb/cr ratio in 10/2022.\par \par 2. BP is at goal and will continue Lisinopril for renal protection\par \par 3. Hyperlipidemia- UTD with lipids and discussed statin for CV risk reduction. Patient has declined statins in the past.\par \par 4. Hypothyroidism/papillary thyroid cancer- Clinically stable. Goal TSH is below 2.5 and no need for TSH suppression. Will decrease Synthroid to 150 mcg daily. UTD with neck ultrasound in December 2019 was unremarkable and will repeat ultrasound in 5 years. PET CT scan in 12/2021 did not show any pathology in the head or neck region. Discussed concerns of immune checkpoint inhibitors and endocrinopathies. Will need close monitoring of TFTs. \par \par 5. Vitamin D def- Recommend vitamin D3 1,000 Iu daily\par \par If stable, follow up in 4 months \par Follow up with podiatry and recommend treatment of tinea pedia

## 2023-02-27 NOTE — REVIEW OF SYSTEMS
[Recent Weight Loss (___ Lbs)] : recent [unfilled] ~Ulb weight loss [Dry Eyes] : dryness of the eyes [Pain/Numbness of Digits] : pain/numbness of digits [Negative] : Respiratory [All other systems negative] : All other systems negative [Recent Weight Gain (___ Lbs)] : no recent weight gain [Nausea] : no nausea [Vomiting] : no vomiting was observed [Constipation] : no constipation [Diarrhea] : no diarrhea [Abdominal Pain] : no abdominal pain [Polydipsia] : no polydipsia [Swelling] : no swelling

## 2023-03-01 ENCOUNTER — RESULT REVIEW (OUTPATIENT)
Age: 68
End: 2023-03-01

## 2023-03-31 ENCOUNTER — APPOINTMENT (OUTPATIENT)
Dept: NUCLEAR MEDICINE | Facility: IMAGING CENTER | Age: 68
End: 2023-03-31

## 2023-03-31 ENCOUNTER — OUTPATIENT (OUTPATIENT)
Dept: OUTPATIENT SERVICES | Facility: HOSPITAL | Age: 68
LOS: 1 days | End: 2023-03-31
Payer: MEDICARE

## 2023-03-31 ENCOUNTER — NON-APPOINTMENT (OUTPATIENT)
Age: 68
End: 2023-03-31

## 2023-03-31 DIAGNOSIS — C16.9 MALIGNANT NEOPLASM OF STOMACH, UNSPECIFIED: ICD-10-CM

## 2023-03-31 PROCEDURE — 78815 PET IMAGE W/CT SKULL-THIGH: CPT

## 2023-03-31 PROCEDURE — A9552: CPT

## 2023-03-31 PROCEDURE — 78815 PET IMAGE W/CT SKULL-THIGH: CPT | Mod: 26,PS,KX,MH

## 2023-04-01 ENCOUNTER — OUTPATIENT (OUTPATIENT)
Dept: OUTPATIENT SERVICES | Facility: HOSPITAL | Age: 68
LOS: 1 days | Discharge: HOME | End: 2023-04-01

## 2023-04-01 DIAGNOSIS — E89.0 POSTPROCEDURAL HYPOTHYROIDISM: Chronic | ICD-10-CM

## 2023-04-01 DIAGNOSIS — C16.9 MALIGNANT NEOPLASM OF STOMACH, UNSPECIFIED: ICD-10-CM

## 2023-04-01 DIAGNOSIS — Z98.890 OTHER SPECIFIED POSTPROCEDURAL STATES: Chronic | ICD-10-CM

## 2023-04-05 ENCOUNTER — RESULT REVIEW (OUTPATIENT)
Age: 68
End: 2023-04-05

## 2023-04-05 ENCOUNTER — NON-APPOINTMENT (OUTPATIENT)
Age: 68
End: 2023-04-05

## 2023-04-05 ENCOUNTER — APPOINTMENT (OUTPATIENT)
Dept: HEMATOLOGY ONCOLOGY | Facility: CLINIC | Age: 68
End: 2023-04-05
Payer: MEDICARE

## 2023-04-05 ENCOUNTER — APPOINTMENT (OUTPATIENT)
Dept: INFUSION THERAPY | Facility: HOSPITAL | Age: 68
End: 2023-04-05

## 2023-04-05 ENCOUNTER — RX RENEWAL (OUTPATIENT)
Age: 68
End: 2023-04-05

## 2023-04-05 VITALS
SYSTOLIC BLOOD PRESSURE: 111 MMHG | BODY MASS INDEX: 28.82 KG/M2 | WEIGHT: 184.08 LBS | HEART RATE: 83 BPM | DIASTOLIC BLOOD PRESSURE: 67 MMHG | RESPIRATION RATE: 16 BRPM | TEMPERATURE: 97.5 F | OXYGEN SATURATION: 95 %

## 2023-04-05 LAB
ALBUMIN SERPL ELPH-MCNC: 4.3 G/DL — SIGNIFICANT CHANGE UP (ref 3.3–5)
ALP SERPL-CCNC: 65 U/L — SIGNIFICANT CHANGE UP (ref 40–120)
ALT FLD-CCNC: 14 U/L — SIGNIFICANT CHANGE UP (ref 10–45)
ANION GAP SERPL CALC-SCNC: 13 MMOL/L — SIGNIFICANT CHANGE UP (ref 5–17)
AST SERPL-CCNC: 21 U/L — SIGNIFICANT CHANGE UP (ref 10–40)
BASOPHILS # BLD AUTO: 0.05 K/UL — SIGNIFICANT CHANGE UP (ref 0–0.2)
BASOPHILS NFR BLD AUTO: 0.9 % — SIGNIFICANT CHANGE UP (ref 0–2)
BILIRUB SERPL-MCNC: 0.4 MG/DL — SIGNIFICANT CHANGE UP (ref 0.2–1.2)
BUN SERPL-MCNC: 16 MG/DL — SIGNIFICANT CHANGE UP (ref 7–23)
CALCIUM SERPL-MCNC: 9.7 MG/DL — SIGNIFICANT CHANGE UP (ref 8.4–10.5)
CEA SERPL-MCNC: 2.1 NG/ML — SIGNIFICANT CHANGE UP (ref 0–3.8)
CHLORIDE SERPL-SCNC: 108 MMOL/L — SIGNIFICANT CHANGE UP (ref 96–108)
CO2 SERPL-SCNC: 22 MMOL/L — SIGNIFICANT CHANGE UP (ref 22–31)
CREAT SERPL-MCNC: 0.84 MG/DL — SIGNIFICANT CHANGE UP (ref 0.5–1.3)
EGFR: 96 ML/MIN/1.73M2 — SIGNIFICANT CHANGE UP
EOSINOPHIL # BLD AUTO: 0.39 K/UL — SIGNIFICANT CHANGE UP (ref 0–0.5)
EOSINOPHIL NFR BLD AUTO: 7.4 % — HIGH (ref 0–6)
GLUCOSE SERPL-MCNC: 107 MG/DL — HIGH (ref 70–99)
HCT VFR BLD CALC: 33.3 % — LOW (ref 39–50)
HGB BLD-MCNC: 11.2 G/DL — LOW (ref 13–17)
IMM GRANULOCYTES NFR BLD AUTO: 0.4 % — SIGNIFICANT CHANGE UP (ref 0–0.9)
LYMPHOCYTES # BLD AUTO: 2.32 K/UL — SIGNIFICANT CHANGE UP (ref 1–3.3)
LYMPHOCYTES # BLD AUTO: 43.8 % — SIGNIFICANT CHANGE UP (ref 13–44)
MCHC RBC-ENTMCNC: 28.1 PG — SIGNIFICANT CHANGE UP (ref 27–34)
MCHC RBC-ENTMCNC: 33.6 G/DL — SIGNIFICANT CHANGE UP (ref 32–36)
MCV RBC AUTO: 83.7 FL — SIGNIFICANT CHANGE UP (ref 80–100)
MONOCYTES # BLD AUTO: 0.59 K/UL — SIGNIFICANT CHANGE UP (ref 0–0.9)
MONOCYTES NFR BLD AUTO: 11.1 % — SIGNIFICANT CHANGE UP (ref 2–14)
NEUTROPHILS # BLD AUTO: 1.93 K/UL — SIGNIFICANT CHANGE UP (ref 1.8–7.4)
NEUTROPHILS NFR BLD AUTO: 36.4 % — LOW (ref 43–77)
NRBC # BLD: 0 /100 WBCS — SIGNIFICANT CHANGE UP (ref 0–0)
PLATELET # BLD AUTO: 220 K/UL — SIGNIFICANT CHANGE UP (ref 150–400)
POTASSIUM SERPL-MCNC: 4.6 MMOL/L — SIGNIFICANT CHANGE UP (ref 3.5–5.3)
POTASSIUM SERPL-SCNC: 4.6 MMOL/L — SIGNIFICANT CHANGE UP (ref 3.5–5.3)
PROT SERPL-MCNC: 6.5 G/DL — SIGNIFICANT CHANGE UP (ref 6–8.3)
RBC # BLD: 3.98 M/UL — LOW (ref 4.2–5.8)
RBC # FLD: 13.4 % — SIGNIFICANT CHANGE UP (ref 10.3–14.5)
SODIUM SERPL-SCNC: 143 MMOL/L — SIGNIFICANT CHANGE UP (ref 135–145)
T4 FREE SERPL-MCNC: 1.4 NG/DL — SIGNIFICANT CHANGE UP (ref 0.9–1.8)
T4 FREE+ TSH PNL SERPL: 0.14 UIU/ML — LOW (ref 0.27–4.2)
WBC # BLD: 5.3 K/UL — SIGNIFICANT CHANGE UP (ref 3.8–10.5)
WBC # FLD AUTO: 5.3 K/UL — SIGNIFICANT CHANGE UP (ref 3.8–10.5)

## 2023-04-05 PROCEDURE — 99214 OFFICE O/P EST MOD 30 MIN: CPT

## 2023-04-05 NOTE — PHYSICAL EXAM
[Fully active, able to carry on all pre-disease performance without restriction] : Status 0 - Fully active, able to carry on all pre-disease performance without restriction [Normal] : affect appropriate [de-identified] : anicteric  [de-identified] : no JVD; no neck swelling [de-identified] : normal respiratory effort, no audible wheeze [de-identified] : reg rate  [de-identified] : non distended

## 2023-04-05 NOTE — HISTORY OF PRESENT ILLNESS
[Disease: _____________________] : Disease: [unfilled] [AJCC Stage: ____] : AJCC Stage: [unfilled] [de-identified] : Patient is a 67 y/o M with known PMHx of hypothyroidism secondary to thyroidectomy from previous thyroid CA and NIDDM who first presented Dr. Cox c/o of epigastric pain made worse with eating since 5/2021 that patient thought was due to drinking too much coffee.  An EGD was performed on 10/19/21 that showed a large ulcerated mass in the gastric antrum/lesser curvature not involving the pylorus.  Biopsies were taken on the mass and were positive for poorly differentiated carcinoma favoring adenocarcinoma and H. pylori.  The patient was then sent for a CT A/P with contrast that showed a distal gastric mass with appearance of transmural extension along with adjacent perigastric lymphadenopathy and upper retroperitoneal lymphadenopathy.  The patient met with Dr. Maloney on 10/26/21 who ordered a CT Chest and a PET scan.  CT Chest on 10/27/21 showed no intrathoracic metastatic disease.  PET scan is scheduled for 11/2/21.  Plan is for ex-lap with Dr. Maloney with Port placement on 11/3/21.  The patient is here today to establish oncologic care.  The patient states that since starting triple therapy for H. pylori, his abdominal pain has improved.  Denies weight loss, anorexia, N/V, diarrhea, constipation, rectal bleeding or melena.\par  Stage IV disease \par MS-H --> FOLFOX Pembro starting Dec 2021 [de-identified] : poorly differentiated carcinoma [de-identified] : CEA [de-identified] : her2-\par CPS >1\par MSI-High\par TMB - 21 Muts/Mb\par \par KIT R177H\par PTEN K267fs*9, T319fs*1\par APC Y6702fb*29\par ASXL1 G645fs*58\par CIC B4279sd*91\par CTNNA1 N738fs*53\par EPHB4 amplification\par MLH1 loss\par PBRM1 Y2512gl*102\par SMAD4 R361H\par TP53 R248W  [de-identified] : Patient presents for follow up today for treatment and to review recent imaging.  He reports recent weight loss that he attributes to change in his DM medication dose.  He denies abdominal pain, N/V, diarrhea, rash, cough.  He denies change in MCCALLUM from his baseline. [FreeTextEntry1] : FLOT started 11/11/21 x 1 --> FOLFOX +pembrolizumab--> 5FU Pembro

## 2023-04-06 DIAGNOSIS — Z51.11 ENCOUNTER FOR ANTINEOPLASTIC CHEMOTHERAPY: ICD-10-CM

## 2023-04-17 ENCOUNTER — NON-APPOINTMENT (OUTPATIENT)
Age: 68
End: 2023-04-17

## 2023-04-19 ENCOUNTER — APPOINTMENT (OUTPATIENT)
Dept: ORTHOPEDIC SURGERY | Facility: CLINIC | Age: 68
End: 2023-04-19
Payer: MEDICARE

## 2023-04-19 ENCOUNTER — NON-APPOINTMENT (OUTPATIENT)
Age: 68
End: 2023-04-19

## 2023-04-19 VITALS
BODY MASS INDEX: 26.52 KG/M2 | SYSTOLIC BLOOD PRESSURE: 118 MMHG | DIASTOLIC BLOOD PRESSURE: 72 MMHG | HEIGHT: 68 IN | WEIGHT: 175 LBS | TEMPERATURE: 68 F

## 2023-04-19 DIAGNOSIS — M79.642 PAIN IN RIGHT HAND: ICD-10-CM

## 2023-04-19 DIAGNOSIS — M19.041 PRIMARY OSTEOARTHRITIS, RIGHT HAND: ICD-10-CM

## 2023-04-19 DIAGNOSIS — M19.042 PRIMARY OSTEOARTHRITIS, LEFT HAND: ICD-10-CM

## 2023-04-19 DIAGNOSIS — M79.641 PAIN IN RIGHT HAND: ICD-10-CM

## 2023-04-19 PROCEDURE — 73130 X-RAY EXAM OF HAND: CPT | Mod: LT

## 2023-04-19 PROCEDURE — 99203 OFFICE O/P NEW LOW 30 MIN: CPT

## 2023-04-19 NOTE — DISCUSSION/SUMMARY
[FreeTextEntry1] : He has findings consistent with bilateral hand arthritis and stiffness, most symptomatic at the left index finger.  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 With regard to the left index finger capillary hemangioma, I did tell him that treatment options in this regard would consist of either observation or surgical excision. He deferred surgery in lieu of observation.  I did tell him that if it increases in size or he develops more pain or other problems or concerns, then he should immediately return to the office..\par \par With regard to the bilateral hand arthritis, I discussed with him that I believe the majority of his symptoms to be emanating more so due to the arthritis. I did tell him that if he is bothered enough by his symptoms in this regard, there is always surgery to replace the MCP joints, but I would not recommend it unless his symptoms were very severe. He deferred and will follow up on an as needed basis in this regard. \par \par He has agreed to the above plan of management and has expressed full understanding.  All questions were fully answered to their satisfaction. \par \par My cumulative time spent on this visit included: Preparation for the visit, review of the medical records, review of pertinent diagnostic studies, examination and counseling of the patient on the above diagnosis, treatment plan and prognosis, orders of diagnostic tests, medication and/or appropriate procedures and documentation in the medical records of today's visit.

## 2023-04-19 NOTE — PHYSICAL EXAM
[de-identified] : - Constitutional: This is a male in no obvious distress.  \par - Psych: Patient is alert and oriented to person, place and time.  Patient has a normal mood and affect.\par - Cardiovascular: Normal pulses throughout the upper extremities.  No significant varicosities are noted in the upper extremities. \par - Neuro: Strength and sensation are intact throughout the upper extremities.  Patient has normal coordination.\par - Respiratory:  Patient exhibits no evidence of shortness of breath or difficulty breathing.\par - Skin: No rashes, lesions, or other abnormalities are noted in the upper extremities.\par \par --- \par \par Examination of both hands demonstrates obvious arthritis throughout the digits.  There is limitation of flexion extension (most notable at the left index finger.  He cannot make a full composite fist.  He has tenderness along the left index finger PIP joint.  There is no evidence of a trigger finger.  He has what appears to be a capillary hemangioma along the palmar aspect of the left index finger proximal phalanx ulnarly and distally.  It is slightly tender.  He is neurovascularly intact distally. [de-identified] : AP, lateral, and oblique radiographs of the bilateral hands demonstrate advanced arthritis throughout the PIP and DIP joints of the digits and less notable arthritis at the MCP joints.  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

## 2023-04-19 NOTE — END OF VISIT
[FreeTextEntry3] : This note was written by Rose Glynn on 04/19/2023 acting solely as a scribe for Dr. Jagjit Anthony.\par  \par All medical record entries made by the Scribe were at my, Dr. Jagjit Anthony, direction and personally dictated by me on 04/19/2023. I have personally reviewed the chart and agree that the record accurately reflects my personal performance of the history, physical exam, assessment and plan.

## 2023-04-19 NOTE — HISTORY OF PRESENT ILLNESS
[Right] : right hand dominant [FreeTextEntry1] : He comes in today for evaluation of bilateral hand pain. He notes an area of what he describes as bruising to the left index finger. The finger is sensitive to touch. His pain is worse with use of the hand. He denies numbness and tingling. He rates his pain as a 4 out of 10.\par \par He has a medical history of thyroid cancer and gastric cancer.\par \par He is a type II diabetic.

## 2023-04-19 NOTE — ADDENDUM
[FreeTextEntry1] : I, Rose Glynn, acted solely as a scribe for Dr. Anthony on this date on 04/19/2023.

## 2023-05-17 ENCOUNTER — RESULT REVIEW (OUTPATIENT)
Age: 68
End: 2023-05-17

## 2023-05-17 ENCOUNTER — APPOINTMENT (OUTPATIENT)
Dept: HEMATOLOGY ONCOLOGY | Facility: CLINIC | Age: 68
End: 2023-05-17
Payer: MEDICARE

## 2023-05-17 ENCOUNTER — APPOINTMENT (OUTPATIENT)
Dept: INFUSION THERAPY | Facility: HOSPITAL | Age: 68
End: 2023-05-17

## 2023-05-17 VITALS
OXYGEN SATURATION: 98 % | BODY MASS INDEX: 27.15 KG/M2 | TEMPERATURE: 97.1 F | WEIGHT: 178.57 LBS | DIASTOLIC BLOOD PRESSURE: 74 MMHG | SYSTOLIC BLOOD PRESSURE: 119 MMHG | HEART RATE: 70 BPM | RESPIRATION RATE: 16 BRPM

## 2023-05-17 LAB
ALBUMIN SERPL ELPH-MCNC: 4.2 G/DL — SIGNIFICANT CHANGE UP (ref 3.3–5)
ALP SERPL-CCNC: 59 U/L — SIGNIFICANT CHANGE UP (ref 40–120)
ALT FLD-CCNC: 10 U/L — SIGNIFICANT CHANGE UP (ref 10–45)
ANION GAP SERPL CALC-SCNC: 16 MMOL/L — SIGNIFICANT CHANGE UP (ref 5–17)
AST SERPL-CCNC: 21 U/L — SIGNIFICANT CHANGE UP (ref 10–40)
BASOPHILS # BLD AUTO: 0.04 K/UL — SIGNIFICANT CHANGE UP (ref 0–0.2)
BASOPHILS NFR BLD AUTO: 0.8 % — SIGNIFICANT CHANGE UP (ref 0–2)
BILIRUB SERPL-MCNC: 0.4 MG/DL — SIGNIFICANT CHANGE UP (ref 0.2–1.2)
BUN SERPL-MCNC: 16 MG/DL — SIGNIFICANT CHANGE UP (ref 7–23)
CALCIUM SERPL-MCNC: 9.5 MG/DL — SIGNIFICANT CHANGE UP (ref 8.4–10.5)
CHLORIDE SERPL-SCNC: 106 MMOL/L — SIGNIFICANT CHANGE UP (ref 96–108)
CO2 SERPL-SCNC: 20 MMOL/L — LOW (ref 22–31)
CREAT SERPL-MCNC: 0.81 MG/DL — SIGNIFICANT CHANGE UP (ref 0.5–1.3)
EGFR: 97 ML/MIN/1.73M2 — SIGNIFICANT CHANGE UP
EOSINOPHIL # BLD AUTO: 0.34 K/UL — SIGNIFICANT CHANGE UP (ref 0–0.5)
EOSINOPHIL NFR BLD AUTO: 6.9 % — HIGH (ref 0–6)
GLUCOSE SERPL-MCNC: 97 MG/DL — SIGNIFICANT CHANGE UP (ref 70–99)
HCT VFR BLD CALC: 32.4 % — LOW (ref 39–50)
HGB BLD-MCNC: 10.7 G/DL — LOW (ref 13–17)
IMM GRANULOCYTES NFR BLD AUTO: 0.2 % — SIGNIFICANT CHANGE UP (ref 0–0.9)
LYMPHOCYTES # BLD AUTO: 2.05 K/UL — SIGNIFICANT CHANGE UP (ref 1–3.3)
LYMPHOCYTES # BLD AUTO: 41.4 % — SIGNIFICANT CHANGE UP (ref 13–44)
MCHC RBC-ENTMCNC: 27.7 PG — SIGNIFICANT CHANGE UP (ref 27–34)
MCHC RBC-ENTMCNC: 33 G/DL — SIGNIFICANT CHANGE UP (ref 32–36)
MCV RBC AUTO: 83.9 FL — SIGNIFICANT CHANGE UP (ref 80–100)
MONOCYTES # BLD AUTO: 0.42 K/UL — SIGNIFICANT CHANGE UP (ref 0–0.9)
MONOCYTES NFR BLD AUTO: 8.5 % — SIGNIFICANT CHANGE UP (ref 2–14)
NEUTROPHILS # BLD AUTO: 2.09 K/UL — SIGNIFICANT CHANGE UP (ref 1.8–7.4)
NEUTROPHILS NFR BLD AUTO: 42.2 % — LOW (ref 43–77)
NRBC # BLD: 0 /100 WBCS — SIGNIFICANT CHANGE UP (ref 0–0)
PLATELET # BLD AUTO: 207 K/UL — SIGNIFICANT CHANGE UP (ref 150–400)
POTASSIUM SERPL-MCNC: 4.3 MMOL/L — SIGNIFICANT CHANGE UP (ref 3.5–5.3)
POTASSIUM SERPL-SCNC: 4.3 MMOL/L — SIGNIFICANT CHANGE UP (ref 3.5–5.3)
PROT SERPL-MCNC: 6.4 G/DL — SIGNIFICANT CHANGE UP (ref 6–8.3)
RBC # BLD: 3.86 M/UL — LOW (ref 4.2–5.8)
RBC # FLD: 13.7 % — SIGNIFICANT CHANGE UP (ref 10.3–14.5)
SODIUM SERPL-SCNC: 141 MMOL/L — SIGNIFICANT CHANGE UP (ref 135–145)
T4 FREE+ TSH PNL SERPL: 0.35 UIU/ML — SIGNIFICANT CHANGE UP (ref 0.27–4.2)
WBC # BLD: 4.95 K/UL — SIGNIFICANT CHANGE UP (ref 3.8–10.5)
WBC # FLD AUTO: 4.95 K/UL — SIGNIFICANT CHANGE UP (ref 3.8–10.5)

## 2023-05-17 PROCEDURE — 99214 OFFICE O/P EST MOD 30 MIN: CPT

## 2023-05-22 NOTE — REVIEW OF SYSTEMS
[Negative] : Allergic/Immunologic [Dysphagia] : no dysphagia [Hoarseness] : no hoarseness [Odynophagia] : no odynophagia [Shortness Of Breath] : no shortness of breath [Wheezing] : no wheezing [Cough] : no cough [Abdominal Pain] : no abdominal pain [Vomiting] : no vomiting

## 2023-05-22 NOTE — HISTORY OF PRESENT ILLNESS
[Disease: _____________________] : Disease: [unfilled] [AJCC Stage: ____] : AJCC Stage: [unfilled] [de-identified] : Patient is a 65 y/o M with known PMHx of hypothyroidism secondary to thyroidectomy from previous thyroid CA and NIDDM who first presented Dr. Cox c/o of epigastric pain made worse with eating since 5/2021 that patient thought was due to drinking too much coffee.  An EGD was performed on 10/19/21 that showed a large ulcerated mass in the gastric antrum/lesser curvature not involving the pylorus.  Biopsies were taken on the mass and were positive for poorly differentiated carcinoma favoring adenocarcinoma and H. pylori.  The patient was then sent for a CT A/P with contrast that showed a distal gastric mass with appearance of transmural extension along with adjacent perigastric lymphadenopathy and upper retroperitoneal lymphadenopathy.  The patient met with Dr. Maloney on 10/26/21 who ordered a CT Chest and a PET scan.  CT Chest on 10/27/21 showed no intrathoracic metastatic disease.  PET scan is scheduled for 11/2/21.  Plan is for ex-lap with Dr. Maloney with Port placement on 11/3/21.  The patient is here today to establish oncologic care.  The patient states that since starting triple therapy for H. pylori, his abdominal pain has improved.  Denies weight loss, anorexia, N/V, diarrhea, constipation, rectal bleeding or melena.\par  Stage IV disease \par MS-H --> FOLFOX Pembro starting Dec 2021 [de-identified] : poorly differentiated carcinoma [de-identified] : CEA [de-identified] : her2-, CPS >1\par MSI-High, TMB - 21 Muts/Mb\par KIT R177H, PTEN K267fs*9, T319fs*1, APC V4376iy*29, ASXL1 G645fs*58\par CIC U5058fs*91, CTNNA1 N738fs*53, EPHB4 amplification, MLH1 loss, PBRM1 K2013on*102, SMAD4 R361H, TP53 R248W  [FreeTextEntry1] : FLOT started 11/11/21 x 1 --> FOLFOX +pembrolizumab--> 5FU Pembro [de-identified] : continues to smoke\par \par no diarrhea or rash \par tolerating pembro

## 2023-05-22 NOTE — PHYSICAL EXAM
[Fully active, able to carry on all pre-disease performance without restriction] : Status 0 - Fully active, able to carry on all pre-disease performance without restriction [Normal] : affect appropriate [de-identified] : anicteric  [de-identified] : no JVD;   [de-identified] : normal respiratory effort, no audible wheeze [de-identified] : reg rate  [de-identified] : non distended

## 2023-06-13 ENCOUNTER — APPOINTMENT (OUTPATIENT)
Dept: ENDOCRINOLOGY | Facility: CLINIC | Age: 68
End: 2023-06-13
Payer: MEDICARE

## 2023-06-13 VITALS
TEMPERATURE: 98 F | BODY MASS INDEX: 26.67 KG/M2 | DIASTOLIC BLOOD PRESSURE: 67 MMHG | OXYGEN SATURATION: 97 % | HEART RATE: 76 BPM | SYSTOLIC BLOOD PRESSURE: 105 MMHG | HEIGHT: 68 IN | WEIGHT: 176 LBS

## 2023-06-13 LAB — HBA1C MFR BLD HPLC: 5.3

## 2023-06-13 PROCEDURE — 99214 OFFICE O/P EST MOD 30 MIN: CPT | Mod: 25

## 2023-06-13 PROCEDURE — 83036 HEMOGLOBIN GLYCOSYLATED A1C: CPT | Mod: QW

## 2023-06-13 NOTE — HISTORY OF PRESENT ILLNESS
[FreeTextEntry1] : 67 y.o. male with h/o hypothyroidism s/p surgery for papillary thyroid cancer and Type 2 DM here for follow up visit. Patient saw GI, Dr. Bee with c/o epigastric pain made worse when eating. Had EGD on 10/19/21 showing a large ulcerated mass in the gastric antrum/lesser curvature not involving the pylorus. Biopsies were taken on the mass and were positive for poorly differentiated carcinoma favoring adenocarcinoma and H. pylori. Had exploratory lap and port placement on 11/3/21. PET CT scan on 11/3/21 showed distal gastric wall thickening and FDG avid abdominal lymphadenopathy compatible with metastatic disease.  Now seeing oncology and was being treated with FOLFOX Pembro started in December 2021. Completed 5FU and now on Pembro. Also h/o DVT and on Xarelto now. \par \par In regards to Type 2 DM, no polyuria and no polydipsia. Not monitoring FS at home. Taking Metformin ER 1,000 mg daily.  UTD with ophthalmology (2/27/23) with mild nonproliferative retinopathy and dry eyes. Had foot pain and using inserts. Does have podiatrist. Taking ACE-I for proteinuria. \par \par For breakfast, no more bagels and has rye with egg whites and coffee with no sugar. \par For lunch: rye with salami. \par For dinner: Greek foods. No sodas or juices. Likes fruits at night with occasional sweets. \par \par Still smoking and drinks wine. Does lots of walking. \par \par In regards to thyroid disease, taking Synthroid 150 mcg daily. No neck complaints. Had unremarkable neck ultrasound on 12/4/19. Had total thyroidectomy and  I131 in 1999. No SOB or palpitations. No heat intolerance. Reports weight loss. \par \par In regards to vitamin D def, off MVI as per oncology

## 2023-06-13 NOTE — ASSESSMENT
[Carbohydrate Consistent Diet] : carbohydrate consistent diet [Diabetes Foot Care] : diabetes foot care [Long Term Vascular Complications] : long term vascular complications of diabetes [Smoking Cessation] : smoking cessation [FreeTextEntry1] : 67 y.o. male with h/o Type 2 DM, hypothyroidism s/p surgery and I131 for papillary thyroid cancer. \par \par 1. Type 2 DM- Good control with Hba1c of 5.3% today. Encouraged a carbohydrate consistent diet and exercise as tolerated. Needs to limit fruit intake. Will decrease Metformin ER to 500 mg daily. Recommend SMBG. Reviewed blood glucose targets. Stable urine alb/cr ratio in 10/2022.\par \par 2. BP is at goal and will continue Lisinopril for renal protection\par \par 3. Hyperlipidemia- Will check lipids and discussed statin for CV risk reduction. Patient has declined statins in the past.\par \par 4. Hypothyroidism/papillary thyroid cancer- Clinically stable. Goal TSH is below 2.5 and no need for TSH suppression. Will continue Synthroid 150 mcg daily. UTD with neck ultrasound in December 2019 was unremarkable and will repeat ultrasound in 5 years. PET CT scan in March 2023 did not show any pathology in the head or neck region. Discussed concerns of immune checkpoint inhibitors and endocrinopathies. Will need close monitoring of TFTs. \par \par 5. Vitamin D def- Recommend vitamin D3 1,000 Iu daily\par \par If stable, follow up in 4 to 6 months based on his traveling to [place]\par Follow up with podiatry and recommend treatment of tinea pedia

## 2023-06-19 ENCOUNTER — OUTPATIENT (OUTPATIENT)
Dept: OUTPATIENT SERVICES | Facility: HOSPITAL | Age: 68
LOS: 1 days | Discharge: HOME | End: 2023-06-19

## 2023-06-19 DIAGNOSIS — E89.0 POSTPROCEDURAL HYPOTHYROIDISM: Chronic | ICD-10-CM

## 2023-06-19 DIAGNOSIS — C16.9 MALIGNANT NEOPLASM OF STOMACH, UNSPECIFIED: ICD-10-CM

## 2023-06-19 DIAGNOSIS — Z98.890 OTHER SPECIFIED POSTPROCEDURAL STATES: Chronic | ICD-10-CM

## 2023-06-28 ENCOUNTER — RESULT REVIEW (OUTPATIENT)
Age: 68
End: 2023-06-28

## 2023-06-28 ENCOUNTER — APPOINTMENT (OUTPATIENT)
Dept: INFUSION THERAPY | Facility: HOSPITAL | Age: 68
End: 2023-06-28

## 2023-06-28 ENCOUNTER — APPOINTMENT (OUTPATIENT)
Dept: HEMATOLOGY ONCOLOGY | Facility: CLINIC | Age: 68
End: 2023-06-28
Payer: MEDICARE

## 2023-06-28 VITALS
HEART RATE: 75 BPM | WEIGHT: 174.6 LBS | TEMPERATURE: 97.3 F | RESPIRATION RATE: 16 BRPM | DIASTOLIC BLOOD PRESSURE: 66 MMHG | SYSTOLIC BLOOD PRESSURE: 109 MMHG | BODY MASS INDEX: 26.55 KG/M2

## 2023-06-28 LAB
24R-OH-CALCIDIOL SERPL-MCNC: 11.8 NG/ML — LOW (ref 30–80)
ALBUMIN SERPL ELPH-MCNC: 4.2 G/DL — SIGNIFICANT CHANGE UP (ref 3.3–5)
ALP SERPL-CCNC: 60 U/L — SIGNIFICANT CHANGE UP (ref 40–120)
ALT FLD-CCNC: 15 U/L — SIGNIFICANT CHANGE UP (ref 10–45)
ANION GAP SERPL CALC-SCNC: 12 MMOL/L — SIGNIFICANT CHANGE UP (ref 5–17)
AST SERPL-CCNC: 31 U/L — SIGNIFICANT CHANGE UP (ref 10–40)
BASOPHILS # BLD AUTO: 0.03 K/UL — SIGNIFICANT CHANGE UP (ref 0–0.2)
BASOPHILS NFR BLD AUTO: 0.6 % — SIGNIFICANT CHANGE UP (ref 0–2)
BILIRUB SERPL-MCNC: 0.4 MG/DL — SIGNIFICANT CHANGE UP (ref 0.2–1.2)
BUN SERPL-MCNC: 18 MG/DL — SIGNIFICANT CHANGE UP (ref 7–23)
CALCIUM SERPL-MCNC: 9.4 MG/DL — SIGNIFICANT CHANGE UP (ref 8.4–10.5)
CHLORIDE SERPL-SCNC: 104 MMOL/L — SIGNIFICANT CHANGE UP (ref 96–108)
CHOLEST SERPL-MCNC: 155 MG/DL — SIGNIFICANT CHANGE UP
CO2 SERPL-SCNC: 24 MMOL/L — SIGNIFICANT CHANGE UP (ref 22–31)
CREAT SERPL-MCNC: 0.78 MG/DL — SIGNIFICANT CHANGE UP (ref 0.5–1.3)
EGFR: 98 ML/MIN/1.73M2 — SIGNIFICANT CHANGE UP
EOSINOPHIL # BLD AUTO: 0.34 K/UL — SIGNIFICANT CHANGE UP (ref 0–0.5)
EOSINOPHIL NFR BLD AUTO: 6.5 % — HIGH (ref 0–6)
GLUCOSE SERPL-MCNC: 110 MG/DL — HIGH (ref 70–99)
HCT VFR BLD CALC: 31.9 % — LOW (ref 39–50)
HDLC SERPL-MCNC: 35 MG/DL — LOW
HGB BLD-MCNC: 10.8 G/DL — LOW (ref 13–17)
IMM GRANULOCYTES NFR BLD AUTO: 0.4 % — SIGNIFICANT CHANGE UP (ref 0–0.9)
LIPID PNL WITH DIRECT LDL SERPL: 97 MG/DL — SIGNIFICANT CHANGE UP
LYMPHOCYTES # BLD AUTO: 2.22 K/UL — SIGNIFICANT CHANGE UP (ref 1–3.3)
LYMPHOCYTES # BLD AUTO: 42.6 % — SIGNIFICANT CHANGE UP (ref 13–44)
MCHC RBC-ENTMCNC: 28.2 PG — SIGNIFICANT CHANGE UP (ref 27–34)
MCHC RBC-ENTMCNC: 33.9 G/DL — SIGNIFICANT CHANGE UP (ref 32–36)
MCV RBC AUTO: 83.3 FL — SIGNIFICANT CHANGE UP (ref 80–100)
MONOCYTES # BLD AUTO: 0.55 K/UL — SIGNIFICANT CHANGE UP (ref 0–0.9)
MONOCYTES NFR BLD AUTO: 10.6 % — SIGNIFICANT CHANGE UP (ref 2–14)
NEUTROPHILS # BLD AUTO: 2.05 K/UL — SIGNIFICANT CHANGE UP (ref 1.8–7.4)
NEUTROPHILS NFR BLD AUTO: 39.3 % — LOW (ref 43–77)
NON HDL CHOLESTEROL: 120 MG/DL — SIGNIFICANT CHANGE UP
NRBC # BLD: 0 /100 WBCS — SIGNIFICANT CHANGE UP (ref 0–0)
PLATELET # BLD AUTO: 196 K/UL — SIGNIFICANT CHANGE UP (ref 150–400)
POTASSIUM SERPL-MCNC: 4 MMOL/L — SIGNIFICANT CHANGE UP (ref 3.5–5.3)
POTASSIUM SERPL-SCNC: 4 MMOL/L — SIGNIFICANT CHANGE UP (ref 3.5–5.3)
PROT SERPL-MCNC: 6.4 G/DL — SIGNIFICANT CHANGE UP (ref 6–8.3)
RBC # BLD: 3.83 M/UL — LOW (ref 4.2–5.8)
RBC # FLD: 13.3 % — SIGNIFICANT CHANGE UP (ref 10.3–14.5)
SODIUM SERPL-SCNC: 140 MMOL/L — SIGNIFICANT CHANGE UP (ref 135–145)
THYROGLOB AB FLD IA-ACNC: <20 IU/ML — SIGNIFICANT CHANGE UP
THYROGLOB AB SERPL-ACNC: <20 IU/ML — SIGNIFICANT CHANGE UP
TRIGL SERPL-MCNC: 114 MG/DL — SIGNIFICANT CHANGE UP
TSH SERPL-MCNC: 0.42 UIU/ML — SIGNIFICANT CHANGE UP (ref 0.27–4.2)
WBC # BLD: 5.21 K/UL — SIGNIFICANT CHANGE UP (ref 3.8–10.5)
WBC # FLD AUTO: 5.21 K/UL — SIGNIFICANT CHANGE UP (ref 3.8–10.5)

## 2023-06-28 PROCEDURE — 99214 OFFICE O/P EST MOD 30 MIN: CPT

## 2023-06-28 NOTE — HISTORY OF PRESENT ILLNESS
[Disease: _____________________] : Disease: [unfilled] [AJCC Stage: ____] : AJCC Stage: [unfilled] [de-identified] : Patient is a 65 y/o M with known PMHx of hypothyroidism secondary to thyroidectomy from previous thyroid CA and NIDDM who first presented Dr. Cox c/o of epigastric pain made worse with eating since 5/2021 that patient thought was due to drinking too much coffee.  An EGD was performed on 10/19/21 that showed a large ulcerated mass in the gastric antrum/lesser curvature not involving the pylorus.  Biopsies were taken on the mass and were positive for poorly differentiated carcinoma favoring adenocarcinoma and H. pylori.  The patient was then sent for a CT A/P with contrast that showed a distal gastric mass with appearance of transmural extension along with adjacent perigastric lymphadenopathy and upper retroperitoneal lymphadenopathy.  The patient met with Dr. Maloney on 10/26/21 who ordered a CT Chest and a PET scan.  CT Chest on 10/27/21 showed no intrathoracic metastatic disease.  PET scan is scheduled for 11/2/21.  Plan is for ex-lap with Dr. Maloney with Port placement on 11/3/21.  The patient is here today to establish oncologic care.  The patient states that since starting triple therapy for H. pylori, his abdominal pain has improved.  Denies weight loss, anorexia, N/V, diarrhea, constipation, rectal bleeding or melena.\par  Stage IV disease \par MS-H --> FOLFOX Pembro starting Dec 2021 [de-identified] : poorly differentiated carcinoma [de-identified] : CEA [de-identified] : her2-, CPS >1\par MSI-High, TMB - 21 Muts/Mb\par KIT R177H, PTEN K267fs*9, T319fs*1, APC B5587xh*29, ASXL1 G645fs*58\par CIC W2614cu*91, CTNNA1 N738fs*53, EPHB4 amplification, MLH1 loss, PBRM1 P3931wm*102, SMAD4 R361H, TP53 R248W  [FreeTextEntry1] : FLOT started 11/11/21 x 1 --> FOLFOX +pembrolizumab--> 5FU Pembro [de-identified] : Patient presents for follow up today and is scheduled for treatment today.  He continues to travel to and from [facility] and is scheduled to leave in 4 days on 7/1 and return the day before his next treatment.  He reports feeling well with good appetite.  He denies anorexia, weight loss, abdominal pain, N/V, diarrhea, constipation.

## 2023-06-28 NOTE — PHYSICAL EXAM
[Fully active, able to carry on all pre-disease performance without restriction] : Status 0 - Fully active, able to carry on all pre-disease performance without restriction [Normal] : affect appropriate [de-identified] : anicteric  [de-identified] : no JVD;   [de-identified] : normal respiratory effort, no audible wheeze [de-identified] : reg rate  [de-identified] : non distended

## 2023-06-29 DIAGNOSIS — Z51.11 ENCOUNTER FOR ANTINEOPLASTIC CHEMOTHERAPY: ICD-10-CM

## 2023-07-03 ENCOUNTER — APPOINTMENT (OUTPATIENT)
Dept: ENDOCRINOLOGY | Facility: CLINIC | Age: 68
End: 2023-07-03

## 2023-07-28 ENCOUNTER — RX RENEWAL (OUTPATIENT)
Age: 68
End: 2023-07-28

## 2023-08-07 ENCOUNTER — RESULT REVIEW (OUTPATIENT)
Age: 68
End: 2023-08-07

## 2023-08-07 ENCOUNTER — APPOINTMENT (OUTPATIENT)
Dept: HEMATOLOGY ONCOLOGY | Facility: CLINIC | Age: 68
End: 2023-08-07
Payer: MEDICARE

## 2023-08-07 ENCOUNTER — APPOINTMENT (OUTPATIENT)
Dept: INFUSION THERAPY | Facility: HOSPITAL | Age: 68
End: 2023-08-07

## 2023-08-07 VITALS
DIASTOLIC BLOOD PRESSURE: 67 MMHG | SYSTOLIC BLOOD PRESSURE: 100 MMHG | BODY MASS INDEX: 25.09 KG/M2 | RESPIRATION RATE: 16 BRPM | WEIGHT: 165.56 LBS | HEART RATE: 77 BPM | OXYGEN SATURATION: 96 % | TEMPERATURE: 97.3 F | HEIGHT: 67.99 IN

## 2023-08-07 LAB
ALBUMIN SERPL ELPH-MCNC: 4.4 G/DL — SIGNIFICANT CHANGE UP (ref 3.3–5)
ALP SERPL-CCNC: 54 U/L — SIGNIFICANT CHANGE UP (ref 40–120)
ALT FLD-CCNC: 10 U/L — SIGNIFICANT CHANGE UP (ref 10–45)
ANION GAP SERPL CALC-SCNC: 9 MMOL/L — SIGNIFICANT CHANGE UP (ref 5–17)
AST SERPL-CCNC: 31 U/L — SIGNIFICANT CHANGE UP (ref 10–40)
BASOPHILS # BLD AUTO: 0.05 K/UL — SIGNIFICANT CHANGE UP (ref 0–0.2)
BASOPHILS NFR BLD AUTO: 0.8 % — SIGNIFICANT CHANGE UP (ref 0–2)
BILIRUB SERPL-MCNC: 0.6 MG/DL — SIGNIFICANT CHANGE UP (ref 0.2–1.2)
BUN SERPL-MCNC: 17 MG/DL — SIGNIFICANT CHANGE UP (ref 7–23)
CALCIUM SERPL-MCNC: 9.5 MG/DL — SIGNIFICANT CHANGE UP (ref 8.4–10.5)
CHLORIDE SERPL-SCNC: 103 MMOL/L — SIGNIFICANT CHANGE UP (ref 96–108)
CO2 SERPL-SCNC: 26 MMOL/L — SIGNIFICANT CHANGE UP (ref 22–31)
CREAT SERPL-MCNC: 0.67 MG/DL — SIGNIFICANT CHANGE UP (ref 0.5–1.3)
EGFR: 102 ML/MIN/1.73M2 — SIGNIFICANT CHANGE UP
EOSINOPHIL # BLD AUTO: 0.4 K/UL — SIGNIFICANT CHANGE UP (ref 0–0.5)
EOSINOPHIL NFR BLD AUTO: 6.7 % — HIGH (ref 0–6)
GLUCOSE SERPL-MCNC: 133 MG/DL — HIGH (ref 70–99)
HCT VFR BLD CALC: 31.5 % — LOW (ref 39–50)
HGB BLD-MCNC: 10.7 G/DL — LOW (ref 13–17)
IMM GRANULOCYTES NFR BLD AUTO: 0.2 % — SIGNIFICANT CHANGE UP (ref 0–0.9)
LYMPHOCYTES # BLD AUTO: 2.18 K/UL — SIGNIFICANT CHANGE UP (ref 1–3.3)
LYMPHOCYTES # BLD AUTO: 36.5 % — SIGNIFICANT CHANGE UP (ref 13–44)
MCHC RBC-ENTMCNC: 28.2 PG — SIGNIFICANT CHANGE UP (ref 27–34)
MCHC RBC-ENTMCNC: 34 G/DL — SIGNIFICANT CHANGE UP (ref 32–36)
MCV RBC AUTO: 83.1 FL — SIGNIFICANT CHANGE UP (ref 80–100)
MONOCYTES # BLD AUTO: 0.52 K/UL — SIGNIFICANT CHANGE UP (ref 0–0.9)
MONOCYTES NFR BLD AUTO: 8.7 % — SIGNIFICANT CHANGE UP (ref 2–14)
NEUTROPHILS # BLD AUTO: 2.81 K/UL — SIGNIFICANT CHANGE UP (ref 1.8–7.4)
NEUTROPHILS NFR BLD AUTO: 47.1 % — SIGNIFICANT CHANGE UP (ref 43–77)
NRBC # BLD: 0 /100 WBCS — SIGNIFICANT CHANGE UP (ref 0–0)
PLATELET # BLD AUTO: 215 K/UL — SIGNIFICANT CHANGE UP (ref 150–400)
POTASSIUM SERPL-MCNC: 3.9 MMOL/L — SIGNIFICANT CHANGE UP (ref 3.5–5.3)
POTASSIUM SERPL-SCNC: 3.9 MMOL/L — SIGNIFICANT CHANGE UP (ref 3.5–5.3)
PROT SERPL-MCNC: 6.8 G/DL — SIGNIFICANT CHANGE UP (ref 6–8.3)
RBC # BLD: 3.79 M/UL — LOW (ref 4.2–5.8)
RBC # FLD: 13.2 % — SIGNIFICANT CHANGE UP (ref 10.3–14.5)
SODIUM SERPL-SCNC: 138 MMOL/L — SIGNIFICANT CHANGE UP (ref 135–145)
TSH SERPL-MCNC: 0.24 UIU/ML — LOW (ref 0.27–4.2)
WBC # BLD: 5.97 K/UL — SIGNIFICANT CHANGE UP (ref 3.8–10.5)
WBC # FLD AUTO: 5.97 K/UL — SIGNIFICANT CHANGE UP (ref 3.8–10.5)

## 2023-08-07 PROCEDURE — 99214 OFFICE O/P EST MOD 30 MIN: CPT

## 2023-08-07 NOTE — PHYSICAL EXAM
[Fully active, able to carry on all pre-disease performance without restriction] : Status 0 - Fully active, able to carry on all pre-disease performance without restriction [Normal] : affect appropriate [de-identified] : anicteric  [de-identified] : no JVD;   [de-identified] : normal respiratory effort, no audible wheeze [de-identified] : reg rate  [de-identified] : non distended

## 2023-08-07 NOTE — HISTORY OF PRESENT ILLNESS
[Disease: _____________________] : Disease: [unfilled] [AJCC Stage: ____] : AJCC Stage: [unfilled] [de-identified] : Patient is a 67 y/o M with known PMHx of hypothyroidism secondary to thyroidectomy from previous thyroid CA and NIDDM who first presented Dr. Cox c/o of epigastric pain made worse with eating since 5/2021 that patient thought was due to drinking too much coffee.  An EGD was performed on 10/19/21 that showed a large ulcerated mass in the gastric antrum/lesser curvature not involving the pylorus.  Biopsies were taken on the mass and were positive for poorly differentiated carcinoma favoring adenocarcinoma and H. pylori.  The patient was then sent for a CT A/P with contrast that showed a distal gastric mass with appearance of transmural extension along with adjacent perigastric lymphadenopathy and upper retroperitoneal lymphadenopathy.  The patient met with Dr. Maloney on 10/26/21 who ordered a CT Chest and a PET scan.  CT Chest on 10/27/21 showed no intrathoracic metastatic disease.  PET scan is scheduled for 11/2/21.  Plan is for ex-lap with Dr. Maloney with Port placement on 11/3/21.  The patient is here today to establish oncologic care.  The patient states that since starting triple therapy for H. pylori, his abdominal pain has improved.  Denies weight loss, anorexia, N/V, diarrhea, constipation, rectal bleeding or melena.  Stage IV disease  MS-H --> FOLFOX Pembro starting Dec 2021 FLOT started 11/11/21 x 1 --> FOLFOX +pembrolizumab (oxali stopped Feb 22) 5FU Pembro  Pembro Alone (since Sept 22) [de-identified] : poorly differentiated carcinoma [de-identified] : CEA [de-identified] : her2-, CPS >1\par  MSI-High, TMB - 21 Muts/Mb\par  KIT R177H, PTEN K267fs*9, T319fs*1, APC Q3402ez*29, ASXL1 G645fs*58\par  CIC V0509qc*91, CTNNA1 N738fs*53, EPHB4 amplification, MLH1 loss, PBRM1 J9183fj*102, SMAD4 R361H, TP53 R248W  [FreeTextEntry1] :  bell [de-identified] : no diarrhea or rash  tolerating pembro continue weight loss but no diarrhea, abd pain or nausea vomitting pt self d/c metformin due to weight loss

## 2023-08-28 ENCOUNTER — RESULT REVIEW (OUTPATIENT)
Age: 68
End: 2023-08-28

## 2023-09-08 ENCOUNTER — OUTPATIENT (OUTPATIENT)
Dept: OUTPATIENT SERVICES | Facility: HOSPITAL | Age: 68
LOS: 1 days | Discharge: HOME | End: 2023-09-08

## 2023-09-08 DIAGNOSIS — C16.9 MALIGNANT NEOPLASM OF STOMACH, UNSPECIFIED: ICD-10-CM

## 2023-09-08 DIAGNOSIS — E89.0 POSTPROCEDURAL HYPOTHYROIDISM: Chronic | ICD-10-CM

## 2023-09-08 DIAGNOSIS — Z98.890 OTHER SPECIFIED POSTPROCEDURAL STATES: Chronic | ICD-10-CM

## 2023-09-13 ENCOUNTER — EMERGENCY (EMERGENCY)
Facility: HOSPITAL | Age: 68
LOS: 1 days | Discharge: HOME | End: 2023-09-13
Attending: EMERGENCY MEDICINE | Admitting: EMERGENCY MEDICINE
Payer: MEDICARE

## 2023-09-13 VITALS
DIASTOLIC BLOOD PRESSURE: 63 MMHG | RESPIRATION RATE: 17 BRPM | TEMPERATURE: 98 F | OXYGEN SATURATION: 99 % | SYSTOLIC BLOOD PRESSURE: 103 MMHG | HEART RATE: 73 BPM

## 2023-09-13 VITALS
OXYGEN SATURATION: 100 % | TEMPERATURE: 98 F | SYSTOLIC BLOOD PRESSURE: 106 MMHG | RESPIRATION RATE: 18 BRPM | DIASTOLIC BLOOD PRESSURE: 68 MMHG | HEIGHT: 67.99 IN | HEART RATE: 82 BPM

## 2023-09-13 DIAGNOSIS — Z98.890 OTHER SPECIFIED POSTPROCEDURAL STATES: Chronic | ICD-10-CM

## 2023-09-13 DIAGNOSIS — E89.0 POSTPROCEDURAL HYPOTHYROIDISM: Chronic | ICD-10-CM

## 2023-09-13 LAB
APPEARANCE UR: CLEAR — SIGNIFICANT CHANGE UP
BILIRUB UR-MCNC: NEGATIVE — SIGNIFICANT CHANGE UP
COLOR SPEC: YELLOW — SIGNIFICANT CHANGE UP
DIFF PNL FLD: ABNORMAL
GLUCOSE UR QL: NEGATIVE MG/DL — SIGNIFICANT CHANGE UP
KETONES UR-MCNC: NEGATIVE MG/DL — SIGNIFICANT CHANGE UP
LEUKOCYTE ESTERASE UR-ACNC: NEGATIVE — SIGNIFICANT CHANGE UP
NITRITE UR-MCNC: NEGATIVE — SIGNIFICANT CHANGE UP
PH UR: 6 — SIGNIFICANT CHANGE UP (ref 5–8)
PROT UR-MCNC: NEGATIVE MG/DL — SIGNIFICANT CHANGE UP
SP GR SPEC: 1.01 — SIGNIFICANT CHANGE UP (ref 1–1.03)
UROBILINOGEN FLD QL: 0.2 MG/DL — SIGNIFICANT CHANGE UP (ref 0.2–1)

## 2023-09-13 PROCEDURE — 99284 EMERGENCY DEPT VISIT MOD MDM: CPT

## 2023-09-13 NOTE — ED PROVIDER NOTE - OBJECTIVE STATEMENT
66 yo M with PMHx of thyroid ca s/p radiation, gastric cancer s/p chemo, on immunotherapy (who is scheduled for a PET scan tomorrow), borderline DM, HLP here with lower abdominal pain and urinary retention for the past 15 hours. The pt has never had urinary retention in the past. Denies f/c, sob, cp, n/v, diarrhea, constipation.

## 2023-09-13 NOTE — ED PROVIDER NOTE - CLINICAL SUMMARY MEDICAL DECISION MAKING FREE TEXT BOX
Morales will be placed for retention; ua. uc/s and will reassess. the pt will likely be discharged with a leg bag to follow with urology as an outpt. The pt is also schedules for  a PET scan tomorrow.

## 2023-09-13 NOTE — ED ADULT NURSE NOTE - NSFALLUNIVINTERV_ED_ALL_ED
Bed/Stretcher in lowest position, wheels locked, appropriate side rails in place/Call bell, personal items and telephone in reach/Instruct patient to call for assistance before getting out of bed/chair/stretcher/Non-slip footwear applied when patient is off stretcher/[place] to call system/Physically safe environment - no spills, clutter or unnecessary equipment/Purposeful proactive rounding/Room/bathroom lighting operational, light cord in reach

## 2023-09-13 NOTE — ED ADULT NURSE NOTE - OBJECTIVE STATEMENT
Received patient in room 28 c/o urinary retention today, patient did not urinate for 15 hours. PMHX Stomach cancer on chemo. Patient denies fever, chills, SOB, chest pain. Labs obtained, 14 Fr friedman catheter inserted in a sterile technique. Side rails up and safety maintained. Call bells within reach. Family at bedside. Received patient in room 28 c/o lower abdominal pain and urinary retention today, patient did not urinate for 15 hours. PMHX Thyroid Cancer, Gastric cancer on chemo, borderline DM, HLD. Patient denies fever, chills, SOB, chest pain. Labs obtained, 14 Fr friedman catheter inserted in a sterile technique. Side rails up and safety maintained. Call bells within reach. Family at bedside.

## 2023-09-13 NOTE — ED PROVIDER NOTE - PATIENT PORTAL LINK FT
You can access the FollowMyHealth Patient Portal offered by [facility] by registering at the following website: [email or web address]. By joining [facility]’s FollowMyHealth portal, you will also be able to view your health information using other applications (apps) compatible with our system.

## 2023-09-13 NOTE — ED PROVIDER NOTE - NSFOLLOWUPINSTRUCTIONS_ED_ALL_ED_FT
Advance activity as tolerated.  Continue all previously prescribed medications as directed unless otherwise instructed.  Follow up with your primary care physician as well as urology in 48-72 hours- bring copies of your results.  Return to the ER for worsening or persistent symptoms, and/or ANY NEW OR CONCERNING SYMPTOMS. If you have issues obtaining follow up, please call: [phone] to obtain a doctor or specialist who takes your insurance in your area.  You may call [phone] to make an appointment with the internal medicine clinic.

## 2023-09-13 NOTE — ED ADULT TRIAGE NOTE - CHIEF COMPLAINT QUOTE
Pt states unable to urinate for 15 hours. Pt appears uncomfortable. Denies chest pain sob. PHX stomach cancer on active chemo. Right chest port. On blood thinners.

## 2023-09-13 NOTE — ED ADULT NURSE REASSESSMENT NOTE - NS ED NURSE REASSESS COMMENT FT1
received report from day shift rn. pt a&oX4, amb at baseline. Vitally stable .Denies chest pain, SOB, n/v, headache, dizziness, numbness/tingling to hands/feet. endorsing abdominal pain is better post placement of friedman catheter. safety maintained. denies other complaints. awaiting orders. resp even unlabored, abd soft, pedal pulses 2+ bilaterally

## 2023-09-13 NOTE — ED ADULT TRIAGE NOTE - GLASGOW COMA SCALE: EYE OPENING, MLM
(E4) spontaneous Posterior Auricular Interpolation Flap Text: A decision was made to reconstruct the defect utilizing an interpolation axial flap and a staged reconstruction.  A telfa template was made of the defect.  This telfa template was then used to outline the posterior auricular interpolation flap.  The donor area for the pedicle flap was then injected with anesthesia.  The flap was excised through the skin and subcutaneous tissue down to the layer of the underlying musculature.  The pedicle flap was carefully excised within this deep plane to maintain its blood supply.  The edges of the donor site were undermined.   The donor site was closed in a primary fashion.  The pedicle was then rotated into position and sutured.  Once the tube was sutured into place, adequate blood supply was confirmed with blanching and refill.  The pedicle was then wrapped with xeroform gauze and dressed appropriately with a telfa and gauze bandage to ensure continued blood supply and protect the attached pedicle.

## 2023-09-14 ENCOUNTER — APPOINTMENT (OUTPATIENT)
Dept: NUCLEAR MEDICINE | Facility: IMAGING CENTER | Age: 68
End: 2023-09-14
Payer: MEDICARE

## 2023-09-14 ENCOUNTER — OUTPATIENT (OUTPATIENT)
Dept: OUTPATIENT SERVICES | Facility: HOSPITAL | Age: 68
LOS: 1 days | End: 2023-09-14
Payer: MEDICARE

## 2023-09-14 DIAGNOSIS — C16.9 MALIGNANT NEOPLASM OF STOMACH, UNSPECIFIED: ICD-10-CM

## 2023-09-14 DIAGNOSIS — E89.0 POSTPROCEDURAL HYPOTHYROIDISM: Chronic | ICD-10-CM

## 2023-09-14 LAB
CULTURE RESULTS: NO GROWTH — SIGNIFICANT CHANGE UP
SPECIMEN SOURCE: SIGNIFICANT CHANGE UP

## 2023-09-14 PROCEDURE — 78815 PET IMAGE W/CT SKULL-THIGH: CPT | Mod: 26,PS,KX,MH

## 2023-09-14 PROCEDURE — A9552: CPT

## 2023-09-14 PROCEDURE — 78815 PET IMAGE W/CT SKULL-THIGH: CPT

## 2023-09-18 ENCOUNTER — RESULT REVIEW (OUTPATIENT)
Age: 68
End: 2023-09-18

## 2023-09-18 ENCOUNTER — APPOINTMENT (OUTPATIENT)
Dept: HEMATOLOGY ONCOLOGY | Facility: CLINIC | Age: 68
End: 2023-09-18
Payer: MEDICARE

## 2023-09-18 ENCOUNTER — APPOINTMENT (OUTPATIENT)
Dept: INFUSION THERAPY | Facility: HOSPITAL | Age: 68
End: 2023-09-18

## 2023-09-18 VITALS
HEART RATE: 74 BPM | SYSTOLIC BLOOD PRESSURE: 97 MMHG | HEIGHT: 65.63 IN | RESPIRATION RATE: 16 BRPM | OXYGEN SATURATION: 98 % | TEMPERATURE: 97.9 F | DIASTOLIC BLOOD PRESSURE: 64 MMHG | BODY MASS INDEX: 27 KG/M2 | WEIGHT: 166.01 LBS

## 2023-09-18 LAB
ALBUMIN SERPL ELPH-MCNC: 4.3 G/DL — SIGNIFICANT CHANGE UP (ref 3.3–5)
ALP SERPL-CCNC: 56 U/L — SIGNIFICANT CHANGE UP (ref 40–120)
ALT FLD-CCNC: 14 U/L — SIGNIFICANT CHANGE UP (ref 10–45)
ANION GAP SERPL CALC-SCNC: 10 MMOL/L — SIGNIFICANT CHANGE UP (ref 5–17)
AST SERPL-CCNC: 33 U/L — SIGNIFICANT CHANGE UP (ref 10–40)
BASOPHILS # BLD AUTO: 0.05 K/UL — SIGNIFICANT CHANGE UP (ref 0–0.2)
BASOPHILS NFR BLD AUTO: 0.8 % — SIGNIFICANT CHANGE UP (ref 0–2)
BILIRUB SERPL-MCNC: 0.3 MG/DL — SIGNIFICANT CHANGE UP (ref 0.2–1.2)
BUN SERPL-MCNC: 9 MG/DL — SIGNIFICANT CHANGE UP (ref 7–23)
CALCIUM SERPL-MCNC: 9 MG/DL — SIGNIFICANT CHANGE UP (ref 8.4–10.5)
CHLORIDE SERPL-SCNC: 94 MMOL/L — LOW (ref 96–108)
CO2 SERPL-SCNC: 25 MMOL/L — SIGNIFICANT CHANGE UP (ref 22–31)
CREAT SERPL-MCNC: 0.69 MG/DL — SIGNIFICANT CHANGE UP (ref 0.5–1.3)
EGFR: 101 ML/MIN/1.73M2 — SIGNIFICANT CHANGE UP
EOSINOPHIL # BLD AUTO: 0.45 K/UL — SIGNIFICANT CHANGE UP (ref 0–0.5)
EOSINOPHIL NFR BLD AUTO: 7.6 % — HIGH (ref 0–6)
GLUCOSE SERPL-MCNC: 113 MG/DL — HIGH (ref 70–99)
HCT VFR BLD CALC: 28.8 % — LOW (ref 39–50)
HGB BLD-MCNC: 10 G/DL — LOW (ref 13–17)
IMM GRANULOCYTES NFR BLD AUTO: 0.2 % — SIGNIFICANT CHANGE UP (ref 0–0.9)
LYMPHOCYTES # BLD AUTO: 1.98 K/UL — SIGNIFICANT CHANGE UP (ref 1–3.3)
LYMPHOCYTES # BLD AUTO: 33.4 % — SIGNIFICANT CHANGE UP (ref 13–44)
MCHC RBC-ENTMCNC: 28.3 PG — SIGNIFICANT CHANGE UP (ref 27–34)
MCHC RBC-ENTMCNC: 34.7 G/DL — SIGNIFICANT CHANGE UP (ref 32–36)
MCV RBC AUTO: 81.6 FL — SIGNIFICANT CHANGE UP (ref 80–100)
MONOCYTES # BLD AUTO: 0.47 K/UL — SIGNIFICANT CHANGE UP (ref 0–0.9)
MONOCYTES NFR BLD AUTO: 7.9 % — SIGNIFICANT CHANGE UP (ref 2–14)
NEUTROPHILS # BLD AUTO: 2.96 K/UL — SIGNIFICANT CHANGE UP (ref 1.8–7.4)
NEUTROPHILS NFR BLD AUTO: 50.1 % — SIGNIFICANT CHANGE UP (ref 43–77)
NRBC # BLD: 0 /100 WBCS — SIGNIFICANT CHANGE UP (ref 0–0)
PLATELET # BLD AUTO: 223 K/UL — SIGNIFICANT CHANGE UP (ref 150–400)
POTASSIUM SERPL-MCNC: 4.3 MMOL/L — SIGNIFICANT CHANGE UP (ref 3.5–5.3)
POTASSIUM SERPL-SCNC: 4.3 MMOL/L — SIGNIFICANT CHANGE UP (ref 3.5–5.3)
PROT SERPL-MCNC: 6.8 G/DL — SIGNIFICANT CHANGE UP (ref 6–8.3)
RBC # BLD: 3.53 M/UL — LOW (ref 4.2–5.8)
RBC # FLD: 13.2 % — SIGNIFICANT CHANGE UP (ref 10.3–14.5)
SODIUM SERPL-SCNC: 129 MMOL/L — LOW (ref 135–145)
TSH SERPL-MCNC: 2.77 UIU/ML — SIGNIFICANT CHANGE UP (ref 0.27–4.2)
WBC # BLD: 5.92 K/UL — SIGNIFICANT CHANGE UP (ref 3.8–10.5)
WBC # FLD AUTO: 5.92 K/UL — SIGNIFICANT CHANGE UP (ref 3.8–10.5)

## 2023-09-18 PROCEDURE — 99214 OFFICE O/P EST MOD 30 MIN: CPT

## 2023-09-19 ENCOUNTER — APPOINTMENT (OUTPATIENT)
Dept: UROLOGY | Facility: CLINIC | Age: 68
End: 2023-09-19

## 2023-09-19 DIAGNOSIS — Z51.11 ENCOUNTER FOR ANTINEOPLASTIC CHEMOTHERAPY: ICD-10-CM

## 2023-10-30 ENCOUNTER — RESULT REVIEW (OUTPATIENT)
Age: 68
End: 2023-10-30

## 2023-10-30 ENCOUNTER — APPOINTMENT (OUTPATIENT)
Dept: INFUSION THERAPY | Facility: HOSPITAL | Age: 68
End: 2023-10-30

## 2023-10-30 ENCOUNTER — APPOINTMENT (OUTPATIENT)
Dept: HEMATOLOGY ONCOLOGY | Facility: CLINIC | Age: 68
End: 2023-10-30
Payer: MEDICARE

## 2023-10-30 VITALS
OXYGEN SATURATION: 99 % | RESPIRATION RATE: 16 BRPM | HEART RATE: 87 BPM | TEMPERATURE: 97.3 F | BODY MASS INDEX: 26.25 KG/M2 | WEIGHT: 161.38 LBS | SYSTOLIC BLOOD PRESSURE: 106 MMHG | DIASTOLIC BLOOD PRESSURE: 68 MMHG | HEIGHT: 65.71 IN

## 2023-10-30 DIAGNOSIS — R33.9 RETENTION OF URINE, UNSPECIFIED: ICD-10-CM

## 2023-10-30 LAB
ALBUMIN SERPL ELPH-MCNC: 4.3 G/DL — SIGNIFICANT CHANGE UP (ref 3.3–5)
ALBUMIN SERPL ELPH-MCNC: 4.3 G/DL — SIGNIFICANT CHANGE UP (ref 3.3–5)
ALP SERPL-CCNC: 66 U/L — SIGNIFICANT CHANGE UP (ref 40–120)
ALP SERPL-CCNC: 66 U/L — SIGNIFICANT CHANGE UP (ref 40–120)
ALT FLD-CCNC: 7 U/L — LOW (ref 10–45)
ALT FLD-CCNC: 7 U/L — LOW (ref 10–45)
ANION GAP SERPL CALC-SCNC: 8 MMOL/L — SIGNIFICANT CHANGE UP (ref 5–17)
ANION GAP SERPL CALC-SCNC: 8 MMOL/L — SIGNIFICANT CHANGE UP (ref 5–17)
AST SERPL-CCNC: 27 U/L — SIGNIFICANT CHANGE UP (ref 10–40)
AST SERPL-CCNC: 27 U/L — SIGNIFICANT CHANGE UP (ref 10–40)
BASOPHILS # BLD AUTO: 0.04 K/UL — SIGNIFICANT CHANGE UP (ref 0–0.2)
BASOPHILS # BLD AUTO: 0.04 K/UL — SIGNIFICANT CHANGE UP (ref 0–0.2)
BASOPHILS NFR BLD AUTO: 0.8 % — SIGNIFICANT CHANGE UP (ref 0–2)
BASOPHILS NFR BLD AUTO: 0.8 % — SIGNIFICANT CHANGE UP (ref 0–2)
BILIRUB SERPL-MCNC: 0.5 MG/DL — SIGNIFICANT CHANGE UP (ref 0.2–1.2)
BILIRUB SERPL-MCNC: 0.5 MG/DL — SIGNIFICANT CHANGE UP (ref 0.2–1.2)
BUN SERPL-MCNC: 15 MG/DL — SIGNIFICANT CHANGE UP (ref 7–23)
BUN SERPL-MCNC: 15 MG/DL — SIGNIFICANT CHANGE UP (ref 7–23)
CALCIUM SERPL-MCNC: 9.1 MG/DL — SIGNIFICANT CHANGE UP (ref 8.4–10.5)
CALCIUM SERPL-MCNC: 9.1 MG/DL — SIGNIFICANT CHANGE UP (ref 8.4–10.5)
CHLORIDE SERPL-SCNC: 98 MMOL/L — SIGNIFICANT CHANGE UP (ref 96–108)
CHLORIDE SERPL-SCNC: 98 MMOL/L — SIGNIFICANT CHANGE UP (ref 96–108)
CO2 SERPL-SCNC: 25 MMOL/L — SIGNIFICANT CHANGE UP (ref 22–31)
CO2 SERPL-SCNC: 25 MMOL/L — SIGNIFICANT CHANGE UP (ref 22–31)
CREAT SERPL-MCNC: 0.67 MG/DL — SIGNIFICANT CHANGE UP (ref 0.5–1.3)
CREAT SERPL-MCNC: 0.67 MG/DL — SIGNIFICANT CHANGE UP (ref 0.5–1.3)
EGFR: 102 ML/MIN/1.73M2 — SIGNIFICANT CHANGE UP
EGFR: 102 ML/MIN/1.73M2 — SIGNIFICANT CHANGE UP
EOSINOPHIL # BLD AUTO: 0.57 K/UL — HIGH (ref 0–0.5)
EOSINOPHIL # BLD AUTO: 0.57 K/UL — HIGH (ref 0–0.5)
EOSINOPHIL NFR BLD AUTO: 11.9 % — HIGH (ref 0–6)
EOSINOPHIL NFR BLD AUTO: 11.9 % — HIGH (ref 0–6)
GLUCOSE SERPL-MCNC: 85 MG/DL — SIGNIFICANT CHANGE UP (ref 70–99)
GLUCOSE SERPL-MCNC: 85 MG/DL — SIGNIFICANT CHANGE UP (ref 70–99)
HCT VFR BLD CALC: 29.6 % — LOW (ref 39–50)
HCT VFR BLD CALC: 29.6 % — LOW (ref 39–50)
HGB BLD-MCNC: 10.5 G/DL — LOW (ref 13–17)
HGB BLD-MCNC: 10.5 G/DL — LOW (ref 13–17)
IMM GRANULOCYTES NFR BLD AUTO: 0.2 % — SIGNIFICANT CHANGE UP (ref 0–0.9)
IMM GRANULOCYTES NFR BLD AUTO: 0.2 % — SIGNIFICANT CHANGE UP (ref 0–0.9)
LYMPHOCYTES # BLD AUTO: 2.12 K/UL — SIGNIFICANT CHANGE UP (ref 1–3.3)
LYMPHOCYTES # BLD AUTO: 2.12 K/UL — SIGNIFICANT CHANGE UP (ref 1–3.3)
LYMPHOCYTES # BLD AUTO: 44.4 % — HIGH (ref 13–44)
LYMPHOCYTES # BLD AUTO: 44.4 % — HIGH (ref 13–44)
MCHC RBC-ENTMCNC: 28.5 PG — SIGNIFICANT CHANGE UP (ref 27–34)
MCHC RBC-ENTMCNC: 28.5 PG — SIGNIFICANT CHANGE UP (ref 27–34)
MCHC RBC-ENTMCNC: 35.5 G/DL — SIGNIFICANT CHANGE UP (ref 32–36)
MCHC RBC-ENTMCNC: 35.5 G/DL — SIGNIFICANT CHANGE UP (ref 32–36)
MCV RBC AUTO: 80.4 FL — SIGNIFICANT CHANGE UP (ref 80–100)
MCV RBC AUTO: 80.4 FL — SIGNIFICANT CHANGE UP (ref 80–100)
MONOCYTES # BLD AUTO: 0.47 K/UL — SIGNIFICANT CHANGE UP (ref 0–0.9)
MONOCYTES # BLD AUTO: 0.47 K/UL — SIGNIFICANT CHANGE UP (ref 0–0.9)
MONOCYTES NFR BLD AUTO: 9.9 % — SIGNIFICANT CHANGE UP (ref 2–14)
MONOCYTES NFR BLD AUTO: 9.9 % — SIGNIFICANT CHANGE UP (ref 2–14)
NEUTROPHILS # BLD AUTO: 1.56 K/UL — LOW (ref 1.8–7.4)
NEUTROPHILS # BLD AUTO: 1.56 K/UL — LOW (ref 1.8–7.4)
NEUTROPHILS NFR BLD AUTO: 32.8 % — LOW (ref 43–77)
NEUTROPHILS NFR BLD AUTO: 32.8 % — LOW (ref 43–77)
NRBC # BLD: 0 /100 WBCS — SIGNIFICANT CHANGE UP (ref 0–0)
NRBC # BLD: 0 /100 WBCS — SIGNIFICANT CHANGE UP (ref 0–0)
PLATELET # BLD AUTO: 221 K/UL — SIGNIFICANT CHANGE UP (ref 150–400)
PLATELET # BLD AUTO: 221 K/UL — SIGNIFICANT CHANGE UP (ref 150–400)
POTASSIUM SERPL-MCNC: 4.3 MMOL/L — SIGNIFICANT CHANGE UP (ref 3.5–5.3)
POTASSIUM SERPL-MCNC: 4.3 MMOL/L — SIGNIFICANT CHANGE UP (ref 3.5–5.3)
POTASSIUM SERPL-SCNC: 4.3 MMOL/L — SIGNIFICANT CHANGE UP (ref 3.5–5.3)
POTASSIUM SERPL-SCNC: 4.3 MMOL/L — SIGNIFICANT CHANGE UP (ref 3.5–5.3)
PROT SERPL-MCNC: 6.9 G/DL — SIGNIFICANT CHANGE UP (ref 6–8.3)
PROT SERPL-MCNC: 6.9 G/DL — SIGNIFICANT CHANGE UP (ref 6–8.3)
RBC # BLD: 3.68 M/UL — LOW (ref 4.2–5.8)
RBC # BLD: 3.68 M/UL — LOW (ref 4.2–5.8)
RBC # FLD: 13.6 % — SIGNIFICANT CHANGE UP (ref 10.3–14.5)
RBC # FLD: 13.6 % — SIGNIFICANT CHANGE UP (ref 10.3–14.5)
SODIUM SERPL-SCNC: 131 MMOL/L — LOW (ref 135–145)
SODIUM SERPL-SCNC: 131 MMOL/L — LOW (ref 135–145)
TSH SERPL-MCNC: 0.45 UIU/ML — SIGNIFICANT CHANGE UP (ref 0.27–4.2)
TSH SERPL-MCNC: 0.45 UIU/ML — SIGNIFICANT CHANGE UP (ref 0.27–4.2)
WBC # BLD: 4.77 K/UL — SIGNIFICANT CHANGE UP (ref 3.8–10.5)
WBC # BLD: 4.77 K/UL — SIGNIFICANT CHANGE UP (ref 3.8–10.5)
WBC # FLD AUTO: 4.77 K/UL — SIGNIFICANT CHANGE UP (ref 3.8–10.5)
WBC # FLD AUTO: 4.77 K/UL — SIGNIFICANT CHANGE UP (ref 3.8–10.5)

## 2023-10-30 PROCEDURE — 99213 OFFICE O/P EST LOW 20 MIN: CPT

## 2023-10-31 ENCOUNTER — NON-APPOINTMENT (OUTPATIENT)
Age: 68
End: 2023-10-31

## 2023-11-16 ENCOUNTER — RX RENEWAL (OUTPATIENT)
Age: 68
End: 2023-11-16

## 2023-11-24 ENCOUNTER — OUTPATIENT (OUTPATIENT)
Dept: OUTPATIENT SERVICES | Facility: HOSPITAL | Age: 68
LOS: 1 days | Discharge: HOME | End: 2023-11-24

## 2023-11-24 DIAGNOSIS — E89.0 POSTPROCEDURAL HYPOTHYROIDISM: Chronic | ICD-10-CM

## 2023-11-24 DIAGNOSIS — C16.9 MALIGNANT NEOPLASM OF STOMACH, UNSPECIFIED: ICD-10-CM

## 2023-11-24 DIAGNOSIS — Z98.890 OTHER SPECIFIED POSTPROCEDURAL STATES: Chronic | ICD-10-CM

## 2023-12-11 ENCOUNTER — RESULT REVIEW (OUTPATIENT)
Age: 68
End: 2023-12-11

## 2023-12-11 ENCOUNTER — APPOINTMENT (OUTPATIENT)
Dept: INFUSION THERAPY | Facility: HOSPITAL | Age: 68
End: 2023-12-11

## 2023-12-11 ENCOUNTER — APPOINTMENT (OUTPATIENT)
Dept: HEMATOLOGY ONCOLOGY | Facility: CLINIC | Age: 68
End: 2023-12-11
Payer: MEDICARE

## 2023-12-11 VITALS
WEIGHT: 159 LBS | HEIGHT: 65.67 IN | HEART RATE: 74 BPM | SYSTOLIC BLOOD PRESSURE: 105 MMHG | OXYGEN SATURATION: 96 % | RESPIRATION RATE: 16 BRPM | BODY MASS INDEX: 25.86 KG/M2 | TEMPERATURE: 98.3 F | DIASTOLIC BLOOD PRESSURE: 66 MMHG

## 2023-12-11 LAB
ALBUMIN SERPL ELPH-MCNC: 4 G/DL — SIGNIFICANT CHANGE UP (ref 3.3–5)
ALBUMIN SERPL ELPH-MCNC: 4 G/DL — SIGNIFICANT CHANGE UP (ref 3.3–5)
ALP SERPL-CCNC: 60 U/L — SIGNIFICANT CHANGE UP (ref 40–120)
ALP SERPL-CCNC: 60 U/L — SIGNIFICANT CHANGE UP (ref 40–120)
ALT FLD-CCNC: 8 U/L — LOW (ref 10–45)
ALT FLD-CCNC: 8 U/L — LOW (ref 10–45)
ANION GAP SERPL CALC-SCNC: 9 MMOL/L — SIGNIFICANT CHANGE UP (ref 5–17)
ANION GAP SERPL CALC-SCNC: 9 MMOL/L — SIGNIFICANT CHANGE UP (ref 5–17)
AST SERPL-CCNC: 28 U/L — SIGNIFICANT CHANGE UP (ref 10–40)
AST SERPL-CCNC: 28 U/L — SIGNIFICANT CHANGE UP (ref 10–40)
BASOPHILS # BLD AUTO: 0.07 K/UL — SIGNIFICANT CHANGE UP (ref 0–0.2)
BASOPHILS # BLD AUTO: 0.07 K/UL — SIGNIFICANT CHANGE UP (ref 0–0.2)
BASOPHILS NFR BLD AUTO: 1.2 % — SIGNIFICANT CHANGE UP (ref 0–2)
BASOPHILS NFR BLD AUTO: 1.2 % — SIGNIFICANT CHANGE UP (ref 0–2)
BILIRUB SERPL-MCNC: 0.4 MG/DL — SIGNIFICANT CHANGE UP (ref 0.2–1.2)
BILIRUB SERPL-MCNC: 0.4 MG/DL — SIGNIFICANT CHANGE UP (ref 0.2–1.2)
BUN SERPL-MCNC: 12 MG/DL — SIGNIFICANT CHANGE UP (ref 7–23)
BUN SERPL-MCNC: 12 MG/DL — SIGNIFICANT CHANGE UP (ref 7–23)
CALCIUM SERPL-MCNC: 8.9 MG/DL — SIGNIFICANT CHANGE UP (ref 8.4–10.5)
CALCIUM SERPL-MCNC: 8.9 MG/DL — SIGNIFICANT CHANGE UP (ref 8.4–10.5)
CHLORIDE SERPL-SCNC: 100 MMOL/L — SIGNIFICANT CHANGE UP (ref 96–108)
CHLORIDE SERPL-SCNC: 100 MMOL/L — SIGNIFICANT CHANGE UP (ref 96–108)
CO2 SERPL-SCNC: 26 MMOL/L — SIGNIFICANT CHANGE UP (ref 22–31)
CO2 SERPL-SCNC: 26 MMOL/L — SIGNIFICANT CHANGE UP (ref 22–31)
CREAT SERPL-MCNC: 0.66 MG/DL — SIGNIFICANT CHANGE UP (ref 0.5–1.3)
CREAT SERPL-MCNC: 0.66 MG/DL — SIGNIFICANT CHANGE UP (ref 0.5–1.3)
EGFR: 102 ML/MIN/1.73M2 — SIGNIFICANT CHANGE UP
EGFR: 102 ML/MIN/1.73M2 — SIGNIFICANT CHANGE UP
EOSINOPHIL # BLD AUTO: 0.88 K/UL — HIGH (ref 0–0.5)
EOSINOPHIL # BLD AUTO: 0.88 K/UL — HIGH (ref 0–0.5)
EOSINOPHIL NFR BLD AUTO: 15.7 % — HIGH (ref 0–6)
EOSINOPHIL NFR BLD AUTO: 15.7 % — HIGH (ref 0–6)
GLUCOSE SERPL-MCNC: 75 MG/DL — SIGNIFICANT CHANGE UP (ref 70–99)
GLUCOSE SERPL-MCNC: 75 MG/DL — SIGNIFICANT CHANGE UP (ref 70–99)
HCT VFR BLD CALC: 29 % — LOW (ref 39–50)
HCT VFR BLD CALC: 29 % — LOW (ref 39–50)
HGB BLD-MCNC: 9.9 G/DL — LOW (ref 13–17)
HGB BLD-MCNC: 9.9 G/DL — LOW (ref 13–17)
IMM GRANULOCYTES NFR BLD AUTO: 0.2 % — SIGNIFICANT CHANGE UP (ref 0–0.9)
IMM GRANULOCYTES NFR BLD AUTO: 0.2 % — SIGNIFICANT CHANGE UP (ref 0–0.9)
LYMPHOCYTES # BLD AUTO: 2.5 K/UL — SIGNIFICANT CHANGE UP (ref 1–3.3)
LYMPHOCYTES # BLD AUTO: 2.5 K/UL — SIGNIFICANT CHANGE UP (ref 1–3.3)
LYMPHOCYTES # BLD AUTO: 44.5 % — HIGH (ref 13–44)
LYMPHOCYTES # BLD AUTO: 44.5 % — HIGH (ref 13–44)
MCHC RBC-ENTMCNC: 27.9 PG — SIGNIFICANT CHANGE UP (ref 27–34)
MCHC RBC-ENTMCNC: 27.9 PG — SIGNIFICANT CHANGE UP (ref 27–34)
MCHC RBC-ENTMCNC: 34.1 G/DL — SIGNIFICANT CHANGE UP (ref 32–36)
MCHC RBC-ENTMCNC: 34.1 G/DL — SIGNIFICANT CHANGE UP (ref 32–36)
MCV RBC AUTO: 81.7 FL — SIGNIFICANT CHANGE UP (ref 80–100)
MCV RBC AUTO: 81.7 FL — SIGNIFICANT CHANGE UP (ref 80–100)
MONOCYTES # BLD AUTO: 0.48 K/UL — SIGNIFICANT CHANGE UP (ref 0–0.9)
MONOCYTES # BLD AUTO: 0.48 K/UL — SIGNIFICANT CHANGE UP (ref 0–0.9)
MONOCYTES NFR BLD AUTO: 8.5 % — SIGNIFICANT CHANGE UP (ref 2–14)
MONOCYTES NFR BLD AUTO: 8.5 % — SIGNIFICANT CHANGE UP (ref 2–14)
NEUTROPHILS # BLD AUTO: 1.68 K/UL — LOW (ref 1.8–7.4)
NEUTROPHILS # BLD AUTO: 1.68 K/UL — LOW (ref 1.8–7.4)
NEUTROPHILS NFR BLD AUTO: 29.9 % — LOW (ref 43–77)
NEUTROPHILS NFR BLD AUTO: 29.9 % — LOW (ref 43–77)
NRBC # BLD: 0 /100 WBCS — SIGNIFICANT CHANGE UP (ref 0–0)
NRBC # BLD: 0 /100 WBCS — SIGNIFICANT CHANGE UP (ref 0–0)
PLATELET # BLD AUTO: 201 K/UL — SIGNIFICANT CHANGE UP (ref 150–400)
PLATELET # BLD AUTO: 201 K/UL — SIGNIFICANT CHANGE UP (ref 150–400)
POTASSIUM SERPL-MCNC: 4.2 MMOL/L — SIGNIFICANT CHANGE UP (ref 3.5–5.3)
POTASSIUM SERPL-MCNC: 4.2 MMOL/L — SIGNIFICANT CHANGE UP (ref 3.5–5.3)
POTASSIUM SERPL-SCNC: 4.2 MMOL/L — SIGNIFICANT CHANGE UP (ref 3.5–5.3)
POTASSIUM SERPL-SCNC: 4.2 MMOL/L — SIGNIFICANT CHANGE UP (ref 3.5–5.3)
PROT SERPL-MCNC: 6.6 G/DL — SIGNIFICANT CHANGE UP (ref 6–8.3)
PROT SERPL-MCNC: 6.6 G/DL — SIGNIFICANT CHANGE UP (ref 6–8.3)
RBC # BLD: 3.55 M/UL — LOW (ref 4.2–5.8)
RBC # BLD: 3.55 M/UL — LOW (ref 4.2–5.8)
RBC # FLD: 13.7 % — SIGNIFICANT CHANGE UP (ref 10.3–14.5)
RBC # FLD: 13.7 % — SIGNIFICANT CHANGE UP (ref 10.3–14.5)
SODIUM SERPL-SCNC: 135 MMOL/L — SIGNIFICANT CHANGE UP (ref 135–145)
SODIUM SERPL-SCNC: 135 MMOL/L — SIGNIFICANT CHANGE UP (ref 135–145)
WBC # BLD: 5.62 K/UL — SIGNIFICANT CHANGE UP (ref 3.8–10.5)
WBC # BLD: 5.62 K/UL — SIGNIFICANT CHANGE UP (ref 3.8–10.5)
WBC # FLD AUTO: 5.62 K/UL — SIGNIFICANT CHANGE UP (ref 3.8–10.5)
WBC # FLD AUTO: 5.62 K/UL — SIGNIFICANT CHANGE UP (ref 3.8–10.5)

## 2023-12-11 PROCEDURE — 99214 OFFICE O/P EST MOD 30 MIN: CPT

## 2023-12-11 RX ORDER — SUCRALFATE 1 G
1 TABLET ORAL
Qty: 0 | Refills: 0 | DISCHARGE

## 2023-12-11 RX ORDER — ACETAMINOPHEN 500 MG
2 TABLET ORAL
Qty: 0 | Refills: 0 | DISCHARGE

## 2023-12-11 RX ORDER — LEVOTHYROXINE SODIUM 125 MCG
1 TABLET ORAL
Qty: 0 | Refills: 0 | DISCHARGE

## 2023-12-11 RX ORDER — METFORMIN HYDROCHLORIDE 850 MG/1
1 TABLET ORAL
Qty: 0 | Refills: 0 | DISCHARGE

## 2023-12-11 RX ORDER — PANTOPRAZOLE SODIUM 20 MG/1
1 TABLET, DELAYED RELEASE ORAL
Qty: 0 | Refills: 0 | DISCHARGE

## 2023-12-11 RX ORDER — LISINOPRIL 2.5 MG/1
1 TABLET ORAL
Qty: 0 | Refills: 0 | DISCHARGE

## 2023-12-11 RX ORDER — IBUPROFEN 200 MG
2 TABLET ORAL
Qty: 0 | Refills: 0 | DISCHARGE

## 2023-12-12 ENCOUNTER — APPOINTMENT (OUTPATIENT)
Dept: ENDOCRINOLOGY | Facility: CLINIC | Age: 68
End: 2023-12-12
Payer: MEDICARE

## 2023-12-12 ENCOUNTER — RESULT CHARGE (OUTPATIENT)
Age: 68
End: 2023-12-12

## 2023-12-12 VITALS
TEMPERATURE: 98.2 F | WEIGHT: 157 LBS | BODY MASS INDEX: 18.16 KG/M2 | HEIGHT: 78 IN | SYSTOLIC BLOOD PRESSURE: 104 MMHG | HEART RATE: 79 BPM | RESPIRATION RATE: 16 BRPM | DIASTOLIC BLOOD PRESSURE: 66 MMHG | OXYGEN SATURATION: 99 %

## 2023-12-12 DIAGNOSIS — Z51.11 ENCOUNTER FOR ANTINEOPLASTIC CHEMOTHERAPY: ICD-10-CM

## 2023-12-12 DIAGNOSIS — R11.2 NAUSEA WITH VOMITING, UNSPECIFIED: ICD-10-CM

## 2023-12-12 LAB
CREAT SPEC-SCNC: 92 MG/DL
HBA1C MFR BLD HPLC: 5.2
MICROALBUMIN 24H UR DL<=1MG/L-MCNC: 4.2 MG/DL
MICROALBUMIN/CREAT 24H UR-RTO: 46 MG/G
TSH SERPL-MCNC: 0.96 UIU/ML — SIGNIFICANT CHANGE UP (ref 0.27–4.2)
TSH SERPL-MCNC: 0.96 UIU/ML — SIGNIFICANT CHANGE UP (ref 0.27–4.2)

## 2023-12-12 PROCEDURE — 83036 HEMOGLOBIN GLYCOSYLATED A1C: CPT | Mod: QW

## 2023-12-12 PROCEDURE — 99214 OFFICE O/P EST MOD 30 MIN: CPT | Mod: 25

## 2023-12-12 RX ORDER — OXYCODONE HYDROCHLORIDE 5 MG/1
5 CAPSULE ORAL
Qty: 40 | Refills: 0 | Status: COMPLETED | COMMUNITY
Start: 2022-02-11 | End: 2023-12-12

## 2023-12-12 RX ORDER — DOCUSATE SODIUM 100 MG/1
100 CAPSULE ORAL 3 TIMES DAILY
Qty: 90 | Refills: 0 | Status: DISCONTINUED | COMMUNITY
Start: 2022-02-24 | End: 2023-12-12

## 2023-12-12 RX ORDER — SUCRALFATE 1 G/1
1 TABLET ORAL
Refills: 0 | Status: COMPLETED | COMMUNITY
Start: 2022-10-11 | End: 2023-12-12

## 2023-12-12 NOTE — HISTORY OF PRESENT ILLNESS
[FreeTextEntry1] : 68 y.o. male with h/o hypothyroidism s/p surgery for papillary thyroid cancer and Type 2 DM here for follow up visit.   Patient saw GI, Dr. Bee with c/o epigastric pain made worse when eating. Had EGD on 10/19/21 showing a large ulcerated mass in the gastric antrum/lesser curvature not involving the pylorus. Biopsies were taken on the mass and were positive for poorly differentiated carcinoma favoring adenocarcinoma and H. pylori. Had exploratory lap and port placement on 11/3/21. PET CT scan on 11/3/21 showed distal gastric wall thickening and FDG avid abdominal lymphadenopathy compatible with metastatic disease.  Now seeing oncology and was being treated with FOLFOX Pembro started in December 2021. Completed 5FU and now on Pembro since 9/2022. Also h/o DVT and on Xarelto now.   Had PET CT scan on 9/14/23 showed minimal FDG avid wall thickening of the gastric antrum, decreased since 3/31/23 is favored to represent treated disease. New indeterminant FDG uptake in the region of the ileocecal valve. No New FDG avid disease.   In regard to Type 2 DM, no polyuria and no polydipsia. Not monitoring FS at home. Taking Metformin  mg daily.  UTD with ophthalmology (2/27/23) with mild nonproliferative retinopathy and dry eyes. Had foot pain and using inserts. Does have podiatrist. Taking ACE-I for proteinuria.   For breakfast, no more bagels and has rye with egg whites and coffee with no sugar.  For lunch: soup  For dinner: Greek foods including fish and vegetables. No sodas or juices. Likes fruits at night with occasional sweets.   Stop smoking and drinks less wine. Does lots of walking.   In regard to thyroid disease, taking Synthroid 150 mcg daily. No neck complaints. Had unremarkable neck ultrasound on 12/4/19. Had total thyroidectomy and I131 in 1999. No SOB or palpitations. No heat intolerance. Reports weight loss.   In regard to vitamin D def, takes vitamin D on and off.

## 2023-12-12 NOTE — REVIEW OF SYSTEMS
[Fatigue] : fatigue [Recent Weight Gain (___ Lbs)] : no recent weight gain [Nausea] : no nausea [Vomiting] : no vomiting was observed [Constipation] : no constipation [Diarrhea] : no diarrhea [Abdominal Pain] : no abdominal pain [Polydipsia] : no polydipsia [Swelling] : no swelling

## 2023-12-12 NOTE — ASSESSMENT
[FreeTextEntry1] : 68 y.o. male with h/o Type 2 DM, hypothyroidism s/p surgery and I131 for papillary thyroid cancer.  1. Type 2 DM- Good control with Hba1c of 5.2% today. Encouraged a carbohydrate consistent diet. Needs to limit fruit intake. Will discontinue Metformin ER. Recommend SMBG. Reviewed blood glucose targets. Stable urine alb/cr ratio in 10/2022 and will repeat today.  2. BP is at goal and will continue Lisinopril for renal protection.  3. Hyperlipidemia- UTD with lipids and discussed statin for CV risk reduction. Patient has declined statins in the past.  4. Hypothyroidism/papillary thyroid cancer- Clinically stable. Goal TSH is below 2.5 and no need for TSH suppression. Will continue Synthroid 150 mcg daily. UTD with neck ultrasound in December 2019 was unremarkable and will repeat ultrasound in 5 years. PET CT scan in March 2023 did not show any pathology in the head or neck region. Discussed concerns of immune checkpoint inhibitors and endocrinopathies. Will need close monitoring of TFTs. Will check thyroglobulin.   5. Vitamin D def- Recommend vitamin D3 1,000 Iu daily    If stable, follow up in 4 to 6 months based on his traveling to [facility].

## 2023-12-14 LAB
THYROGLOB AB SERPL-ACNC: <20 IU/ML
THYROGLOB SERPL-MCNC: <0.2 NG/ML

## 2023-12-26 ENCOUNTER — RX RENEWAL (OUTPATIENT)
Age: 68
End: 2023-12-26

## 2023-12-29 ENCOUNTER — RESULT REVIEW (OUTPATIENT)
Age: 68
End: 2023-12-29

## 2024-01-05 ENCOUNTER — OUTPATIENT (OUTPATIENT)
Dept: OUTPATIENT SERVICES | Facility: HOSPITAL | Age: 69
LOS: 1 days | End: 2024-01-05
Payer: MEDICARE

## 2024-01-05 ENCOUNTER — APPOINTMENT (OUTPATIENT)
Dept: NUCLEAR MEDICINE | Facility: IMAGING CENTER | Age: 69
End: 2024-01-05
Payer: MEDICARE

## 2024-01-05 DIAGNOSIS — Z98.890 OTHER SPECIFIED POSTPROCEDURAL STATES: Chronic | ICD-10-CM

## 2024-01-05 DIAGNOSIS — C16.9 MALIGNANT NEOPLASM OF STOMACH, UNSPECIFIED: ICD-10-CM

## 2024-01-05 DIAGNOSIS — E89.0 POSTPROCEDURAL HYPOTHYROIDISM: Chronic | ICD-10-CM

## 2024-01-05 PROCEDURE — A9552: CPT

## 2024-01-05 PROCEDURE — 78815 PET IMAGE W/CT SKULL-THIGH: CPT | Mod: 26,PS,KX,MH

## 2024-01-05 PROCEDURE — 78815 PET IMAGE W/CT SKULL-THIGH: CPT

## 2024-01-10 ENCOUNTER — APPOINTMENT (OUTPATIENT)
Dept: HEMATOLOGY ONCOLOGY | Facility: CLINIC | Age: 69
End: 2024-01-10
Payer: MEDICARE

## 2024-01-10 VITALS
SYSTOLIC BLOOD PRESSURE: 111 MMHG | RESPIRATION RATE: 16 BRPM | DIASTOLIC BLOOD PRESSURE: 69 MMHG | TEMPERATURE: 97 F | WEIGHT: 157.41 LBS | BODY MASS INDEX: 25 KG/M2 | OXYGEN SATURATION: 97 % | HEART RATE: 71 BPM | HEIGHT: 66.7 IN

## 2024-01-10 PROCEDURE — 99215 OFFICE O/P EST HI 40 MIN: CPT

## 2024-01-11 ENCOUNTER — APPOINTMENT (OUTPATIENT)
Dept: INFUSION THERAPY | Facility: HOSPITAL | Age: 69
End: 2024-01-11

## 2024-01-15 NOTE — PHYSICAL EXAM
[Fully active, able to carry on all pre-disease performance without restriction] : Status 0 - Fully active, able to carry on all pre-disease performance without restriction [Normal] : RRR, normal S1S2, no murmurs, rubs, gallops [de-identified] : anicteric  [de-identified] : normal respiratory effort,   [de-identified] : legs wwp, no edema [de-identified] : abd soft NTND

## 2024-01-15 NOTE — REVIEW OF SYSTEMS
[Cough] : cough [Negative] : Allergic/Immunologic [Fever] : no fever [Night Sweats] : no night sweats [Dysphagia] : no dysphagia [Hoarseness] : no hoarseness [Odynophagia] : no odynophagia [Shortness Of Breath] : no shortness of breath [Wheezing] : no wheezing [Abdominal Pain] : no abdominal pain [Vomiting] : no vomiting [FreeTextEntry4] : rhinorrhea [FreeTextEntry2] : + weight loss

## 2024-01-15 NOTE — HISTORY OF PRESENT ILLNESS
[Disease: _____________________] : Disease: [unfilled] [AJCC Stage: ____] : AJCC Stage: [unfilled] [de-identified] : Patient is a 65 y/o M with known PMHx of hypothyroidism secondary to thyroidectomy from previous thyroid CA and NIDDM who first presented Dr. Cox c/o of epigastric pain made worse with eating since 5/2021 that patient thought was due to drinking too much coffee.  An EGD was performed on 10/19/21 that showed a large ulcerated mass in the gastric antrum/lesser curvature not involving the pylorus.  Biopsies were taken on the mass and were positive for poorly differentiated carcinoma favoring adenocarcinoma and H. pylori.  The patient was then sent for a CT A/P with contrast that showed a distal gastric mass with appearance of transmural extension along with adjacent perigastric lymphadenopathy and upper retroperitoneal lymphadenopathy.  The patient met with Dr. Maloney on 10/26/21 who ordered a CT Chest and a PET scan.  CT Chest on 10/27/21 showed no intrathoracic metastatic disease.  PET scan is scheduled for 11/2/21.  Plan is for ex-lap with Dr. Maloney with Port placement on 11/3/21.  The patient is here today to establish oncologic care.  The patient states that since starting triple therapy for H. pylori, his abdominal pain has improved.  Denies weight loss, anorexia, N/V, diarrhea, constipation, rectal bleeding or melena.  Stage IV disease  MS-H --> FOLFOX Pembro starting Dec 2021 FLOT started 11/11/21 x 1 --> FOLFOX +pembrolizumab (oxali stopped Feb 22) 5FU Pembro  Pembro Alone (since Sept 22) [de-identified] : poorly differentiated carcinoma [de-identified] : CEA [de-identified] : her2-, CPS >1\par  MSI-High, TMB - 21 Muts/Mb\par  KIT R177H, PTEN K267fs*9, T319fs*1, APC [id number]*29, ASXL1 G645fs*58\par  CIC D5016ko*91, CTNNA1 N738fs*53, EPHB4 amplification, MLH1 loss, PBRM1 B6855vg*102, SMAD4 R361H, TP53 R248W  [FreeTextEntry1] :  bell [de-identified] : 1/10/24: feeling well, no new symptoms. Eating, drinking the same. BMs good. Pt would like to go to [facility] for 3 months. Is having a cold, taking Abx, coughing clear sputum, no SOB. No chest pain.

## 2024-04-02 ENCOUNTER — RESULT REVIEW (OUTPATIENT)
Age: 69
End: 2024-04-02

## 2024-04-23 ENCOUNTER — OUTPATIENT (OUTPATIENT)
Dept: OUTPATIENT SERVICES | Facility: HOSPITAL | Age: 69
LOS: 1 days | Discharge: HOME | End: 2024-04-23

## 2024-04-23 DIAGNOSIS — Z98.890 OTHER SPECIFIED POSTPROCEDURAL STATES: Chronic | ICD-10-CM

## 2024-04-23 DIAGNOSIS — C16.9 MALIGNANT NEOPLASM OF STOMACH, UNSPECIFIED: ICD-10-CM

## 2024-04-23 DIAGNOSIS — E89.0 POSTPROCEDURAL HYPOTHYROIDISM: Chronic | ICD-10-CM

## 2024-04-29 ENCOUNTER — OUTPATIENT (OUTPATIENT)
Dept: OUTPATIENT SERVICES | Facility: HOSPITAL | Age: 69
LOS: 1 days | End: 2024-04-29
Payer: MEDICARE

## 2024-04-29 ENCOUNTER — APPOINTMENT (OUTPATIENT)
Dept: NUCLEAR MEDICINE | Facility: CLINIC | Age: 69
End: 2024-04-29
Payer: MEDICARE

## 2024-04-29 DIAGNOSIS — E89.0 POSTPROCEDURAL HYPOTHYROIDISM: Chronic | ICD-10-CM

## 2024-04-29 DIAGNOSIS — C16.9 MALIGNANT NEOPLASM OF STOMACH, UNSPECIFIED: ICD-10-CM

## 2024-04-29 DIAGNOSIS — Z98.890 OTHER SPECIFIED POSTPROCEDURAL STATES: Chronic | ICD-10-CM

## 2024-04-29 PROCEDURE — A9552: CPT

## 2024-04-29 PROCEDURE — 78815 PET IMAGE W/CT SKULL-THIGH: CPT | Mod: 26,PS,KX,MH

## 2024-04-29 PROCEDURE — 78815 PET IMAGE W/CT SKULL-THIGH: CPT

## 2024-05-01 ENCOUNTER — NON-APPOINTMENT (OUTPATIENT)
Age: 69
End: 2024-05-01

## 2024-05-02 ENCOUNTER — APPOINTMENT (OUTPATIENT)
Dept: INFUSION THERAPY | Facility: HOSPITAL | Age: 69
End: 2024-05-02

## 2024-05-02 ENCOUNTER — RESULT REVIEW (OUTPATIENT)
Age: 69
End: 2024-05-02

## 2024-05-02 ENCOUNTER — APPOINTMENT (OUTPATIENT)
Dept: HEMATOLOGY ONCOLOGY | Facility: CLINIC | Age: 69
End: 2024-05-02
Payer: MEDICARE

## 2024-05-02 VITALS
HEART RATE: 91 BPM | DIASTOLIC BLOOD PRESSURE: 62 MMHG | HEIGHT: 65.98 IN | WEIGHT: 151.9 LBS | OXYGEN SATURATION: 96 % | RESPIRATION RATE: 16 BRPM | TEMPERATURE: 97 F | BODY MASS INDEX: 24.41 KG/M2 | SYSTOLIC BLOOD PRESSURE: 109 MMHG

## 2024-05-02 DIAGNOSIS — I82.409 ACUTE EMBOLISM AND THROMBOSIS OF UNSPECIFIED DEEP VEINS OF UNSPECIFIED LOWER EXTREMITY: ICD-10-CM

## 2024-05-02 LAB
APTT BLD: 41.4 SEC — HIGH (ref 24.5–35.6)
HCT VFR BLD CALC: 31 % — LOW (ref 39–50)
HGB BLD-MCNC: 10.7 G/DL — LOW (ref 13–17)
INR BLD: 1.17 RATIO — SIGNIFICANT CHANGE UP (ref 0.85–1.18)
MCHC RBC-ENTMCNC: 27.6 PG — SIGNIFICANT CHANGE UP (ref 27–34)
MCHC RBC-ENTMCNC: 34.5 GM/DL — SIGNIFICANT CHANGE UP (ref 32–36)
MCV RBC AUTO: 80.1 FL — SIGNIFICANT CHANGE UP (ref 80–100)
PLATELET # BLD AUTO: 221 K/UL — SIGNIFICANT CHANGE UP (ref 150–400)
PROTHROM AB SERPL-ACNC: 13.2 SEC — HIGH (ref 9.5–13)
RBC # BLD: 3.87 M/UL — LOW (ref 4.2–5.8)
RBC # FLD: 13.5 % — SIGNIFICANT CHANGE UP (ref 10.3–14.5)
T4 FREE+ TSH PNL SERPL: 0.6 UIU/ML — SIGNIFICANT CHANGE UP (ref 0.27–4.2)
WBC # BLD: 7.18 K/UL — SIGNIFICANT CHANGE UP (ref 3.8–10.5)
WBC # FLD AUTO: 7.18 K/UL — SIGNIFICANT CHANGE UP (ref 3.8–10.5)

## 2024-05-02 PROCEDURE — G2211 COMPLEX E/M VISIT ADD ON: CPT

## 2024-05-02 PROCEDURE — 99214 OFFICE O/P EST MOD 30 MIN: CPT

## 2024-05-03 LAB
ALBUMIN SERPL ELPH-MCNC: 4.1 G/DL — SIGNIFICANT CHANGE UP (ref 3.3–5)
ALP SERPL-CCNC: 61 U/L — SIGNIFICANT CHANGE UP (ref 40–120)
ALT FLD-CCNC: 14 U/L — SIGNIFICANT CHANGE UP (ref 10–45)
ANION GAP SERPL CALC-SCNC: 16 MMOL/L — SIGNIFICANT CHANGE UP (ref 5–17)
AST SERPL-CCNC: 33 U/L — SIGNIFICANT CHANGE UP (ref 10–40)
BILIRUB SERPL-MCNC: 0.5 MG/DL — SIGNIFICANT CHANGE UP (ref 0.2–1.2)
BUN SERPL-MCNC: 14 MG/DL — SIGNIFICANT CHANGE UP (ref 7–23)
CALCIUM SERPL-MCNC: 9 MG/DL — SIGNIFICANT CHANGE UP (ref 8.4–10.5)
CHLORIDE SERPL-SCNC: 94 MMOL/L — LOW (ref 96–108)
CO2 SERPL-SCNC: 19 MMOL/L — LOW (ref 22–31)
CREAT SERPL-MCNC: 0.57 MG/DL — SIGNIFICANT CHANGE UP (ref 0.5–1.3)
EGFR: 107 ML/MIN/1.73M2 — SIGNIFICANT CHANGE UP
GLUCOSE SERPL-MCNC: 62 MG/DL — LOW (ref 70–99)
POTASSIUM SERPL-MCNC: 4.1 MMOL/L — SIGNIFICANT CHANGE UP (ref 3.5–5.3)
POTASSIUM SERPL-SCNC: 4.1 MMOL/L — SIGNIFICANT CHANGE UP (ref 3.5–5.3)
PROT SERPL-MCNC: 7 G/DL — SIGNIFICANT CHANGE UP (ref 6–8.3)
SODIUM SERPL-SCNC: 129 MMOL/L — LOW (ref 135–145)

## 2024-05-04 PROBLEM — I82.409 DVT (DEEP VENOUS THROMBOSIS): Status: ACTIVE | Noted: 2022-02-11

## 2024-05-04 NOTE — HISTORY OF PRESENT ILLNESS
[Disease: _____________________] : Disease: [unfilled] [AJCC Stage: ____] : AJCC Stage: [unfilled] [de-identified] : Patient is a 65 y/o M with known PMHx of hypothyroidism secondary to thyroidectomy from previous thyroid CA and NIDDM who first presented Dr. Cox c/o of epigastric pain made worse with eating since 5/2021 that patient thought was due to drinking too much coffee.  An EGD was performed on 10/19/21 that showed a large ulcerated mass in the gastric antrum/lesser curvature not involving the pylorus.  Biopsies were taken on the mass and were positive for poorly differentiated carcinoma favoring adenocarcinoma and H. pylori.  The patient was then sent for a CT A/P with contrast that showed a distal gastric mass with appearance of transmural extension along with adjacent perigastric lymphadenopathy and upper retroperitoneal lymphadenopathy.  The patient met with Dr. Maloney on 10/26/21 who ordered a CT Chest and a PET scan.  CT Chest on 10/27/21 showed no intrathoracic metastatic disease.  PET scan is scheduled for 11/2/21.  Plan is for ex-lap with Dr. Maloney with Port placement on 11/3/21.  The patient is here today to establish oncologic care.  The patient states that since starting triple therapy for H. pylori, his abdominal pain has improved.  Denies weight loss, anorexia, N/V, diarrhea, constipation, rectal bleeding or melena.  Stage IV disease  MS-H --> FOLFOX Pembro starting Dec 2021 FLOT started 11/11/21 x 1 --> FOLFOX +pembrolizumab (oxali stopped Feb 22) 5FU Pembro  Pembro Alone (since Sept 22) and completed treatment Dec 2023 then placed on observation  [de-identified] : poorly differentiated carcinoma [de-identified] : CEA [de-identified] : her2-, CPS >1\par  MSI-High, TMB - 21 Muts/Mb\par  KIT R177H, PTEN K267fs*9, T319fs*1, APC P8385pr*29, ASXL1 G645fs*58\par  CIC O3048ri*91, CTNNA1 N738fs*53, EPHB4 amplification, MLH1 loss, PBRM1 I5262af*102, SMAD4 R361H, TP53 R248W  [FreeTextEntry1] :  observation  [de-identified] : here to review imaging  no new complaints

## 2024-05-04 NOTE — REVIEW OF SYSTEMS
[Fever] : no fever [Dysphagia] : no dysphagia [Night Sweats] : no night sweats [Hoarseness] : no hoarseness [Odynophagia] : no odynophagia [Shortness Of Breath] : no shortness of breath [Wheezing] : no wheezing [Cough] : cough [Abdominal Pain] : no abdominal pain [Vomiting] : no vomiting [Negative] : Allergic/Immunologic [FreeTextEntry2] : + weight loss [FreeTextEntry4] : rhinorrhea

## 2024-05-04 NOTE — PHYSICAL EXAM
[Fully active, able to carry on all pre-disease performance without restriction] : Status 0 - Fully active, able to carry on all pre-disease performance without restriction [Normal] : affect appropriate [de-identified] : anicteric  [de-identified] : normal respiratory effort,   [de-identified] : reg rate

## 2024-05-14 ENCOUNTER — APPOINTMENT (OUTPATIENT)
Dept: OTOLARYNGOLOGY | Facility: CLINIC | Age: 69
End: 2024-05-14

## 2024-06-05 PROBLEM — C16.9 GASTRIC CANCER: Status: ACTIVE | Noted: 2021-10-26

## 2024-06-06 ENCOUNTER — APPOINTMENT (OUTPATIENT)
Dept: ENDOVASCULAR SURGERY | Facility: CLINIC | Age: 69
End: 2024-06-06
Payer: MEDICARE

## 2024-06-06 ENCOUNTER — RESULT REVIEW (OUTPATIENT)
Age: 69
End: 2024-06-06

## 2024-06-06 VITALS
HEART RATE: 65 BPM | HEIGHT: 68 IN | BODY MASS INDEX: 23.49 KG/M2 | TEMPERATURE: 98.2 F | WEIGHT: 155 LBS | RESPIRATION RATE: 18 BRPM | OXYGEN SATURATION: 95 % | DIASTOLIC BLOOD PRESSURE: 76 MMHG | SYSTOLIC BLOOD PRESSURE: 118 MMHG

## 2024-06-06 DIAGNOSIS — C16.9 MALIGNANT NEOPLASM OF STOMACH, UNSPECIFIED: ICD-10-CM

## 2024-06-06 PROCEDURE — 36590 REMOVAL TUNNELED CV CATH: CPT

## 2024-06-06 PROCEDURE — 77001 FLUOROGUIDE FOR VEIN DEVICE: CPT

## 2024-06-06 RX ORDER — RIVAROXABAN 20 MG/1
20 TABLET, FILM COATED ORAL
Qty: 30 | Refills: 0 | Status: ACTIVE | COMMUNITY
Start: 2022-03-07 | End: 1900-01-01

## 2024-06-06 RX ORDER — METFORMIN ER 500 MG 500 MG/1
500 TABLET ORAL
Qty: 90 | Refills: 3 | Status: DISCONTINUED | COMMUNITY
Start: 2019-04-08 | End: 2024-06-06

## 2024-06-06 NOTE — HISTORY OF PRESENT ILLNESS
[FreeTextEntry1] : accompanied by wife Nicole [phone] alert and oriented last xarelto 3 days ago  no medications taken this morninig  [FreeTextEntry5] : yesterday at 6pm  [FreeTextEntry6] : Dr. Morgan

## 2024-06-06 NOTE — PAST MEDICAL HISTORY
[Increasing age ( >40 years old)] : Increasing age ( >40 years old) [Malignancy] : Malignancy [No therapy indicated for cases scheduled for less than one hour] : No therapy indicated for cases scheduled for less than one hour. [FreeTextEntry1] : Malignant Hyperthermia Screening Tool and Risk of Bleeding Assessment  Mr. KARINA ALDRICH denies family history of unexpected death following Anesthesia or Exercise. Denies Family history of Malignant Hyperthermia, Muscle or Neuromuscular disorder and High Temperature following exercise.  Mr. KARINA ALDRICH denies history of Muscle Spasm, Dark or Chocolate - Colored urine and Unanticipated fever immediately following anesthesia or serious exercise.  Mr. ALDRICH also denies bleeding tendencies/ Risks of Bleeding.

## 2024-06-06 NOTE — ASSESSMENT
[FreeTextEntry1] : Gastric ca with completion of Rx for port removal.  Consent obtained.  Anesthsia plan formulated and discussed with anesthesiologist.  Xaralto held for several days.  Port removed without incident.  EBL=minimal.  FUll report to follow.

## 2024-06-10 ENCOUNTER — APPOINTMENT (OUTPATIENT)
Dept: ENDOCRINOLOGY | Facility: CLINIC | Age: 69
End: 2024-06-10
Payer: MEDICARE

## 2024-06-10 VITALS
OXYGEN SATURATION: 96 % | HEART RATE: 72 BPM | BODY MASS INDEX: 23.86 KG/M2 | TEMPERATURE: 98 F | DIASTOLIC BLOOD PRESSURE: 55 MMHG | WEIGHT: 157.44 LBS | RESPIRATION RATE: 16 BRPM | SYSTOLIC BLOOD PRESSURE: 92 MMHG | HEIGHT: 68 IN

## 2024-06-10 DIAGNOSIS — C79.9 SECONDARY MALIGNANT NEOPLASM OF UNSPECIFIED SITE: ICD-10-CM

## 2024-06-10 DIAGNOSIS — E11.9 TYPE 2 DIABETES MELLITUS W/OUT COMPLICATIONS: ICD-10-CM

## 2024-06-10 DIAGNOSIS — C16.9 SECONDARY MALIGNANT NEOPLASM OF UNSPECIFIED SITE: ICD-10-CM

## 2024-06-10 DIAGNOSIS — C73 MALIGNANT NEOPLASM OF THYROID GLAND: ICD-10-CM

## 2024-06-10 DIAGNOSIS — E55.9 VITAMIN D DEFICIENCY, UNSPECIFIED: ICD-10-CM

## 2024-06-10 DIAGNOSIS — E89.0 POSTPROCEDURAL HYPOTHYROIDISM: ICD-10-CM

## 2024-06-10 LAB — HBA1C MFR BLD HPLC: 5.6

## 2024-06-10 PROCEDURE — 83036 HEMOGLOBIN GLYCOSYLATED A1C: CPT | Mod: QW

## 2024-06-10 PROCEDURE — 99214 OFFICE O/P EST MOD 30 MIN: CPT

## 2024-06-10 PROCEDURE — G2211 COMPLEX E/M VISIT ADD ON: CPT

## 2024-06-10 NOTE — PHYSICAL EXAM
[Alert] : alert [No Acute Distress] : no acute distress [Normal Sclera/Conjunctiva] : normal sclera/conjunctiva [EOMI] : extra ocular movement intact [No LAD] : no lymphadenopathy [Well Healed Scar] : well healed scar [No Respiratory Distress] : no respiratory distress [Clear to Auscultation] : lungs were clear to auscultation bilaterally [Normal S1, S2] : normal S1 and S2 [Regular Rhythm] : with a regular rhythm [No Edema] : no peripheral edema [Pedal Pulses Normal] : the pedal pulses are present [Normal Bowel Sounds] : normal bowel sounds [Not Tender] : non-tender [Not Distended] : not distended [Soft] : abdomen soft [Normal Anterior Cervical Nodes] : no anterior cervical lymphadenopathy [No Clubbing, Cyanosis] : no clubbing  or cyanosis of the fingernails [No Rash] : no rash [Normal Reflexes] : deep tendon reflexes were 2+ and symmetric [Normal Affect] : the affect was normal [Normal Mood] : the mood was normal

## 2024-06-11 PROBLEM — C79.9 METASTASIS FROM GASTRIC CANCER: Status: ACTIVE | Noted: 2022-08-05

## 2024-06-11 NOTE — HISTORY OF PRESENT ILLNESS
[FreeTextEntry1] : 68 y.o. male with h/o hypothyroidism s/p surgery for papillary thyroid cancer and Type 2 DM here for follow up visit.   Patient saw GI, Dr. Bee with c/o epigastric pain made worse when eating. Had EGD on 10/19/21 showing a large ulcerated mass in the gastric antrum/lesser curvature not involving the pylorus. Biopsies were taken on the mass and were positive for poorly differentiated carcinoma favoring adenocarcinoma and H. pylori. Had exploratory lap and port placement on 11/3/21. PET CT scan on 11/3/21 showed distal gastric wall thickening and FDG avid abdominal lymphadenopathy compatible with metastatic disease.  Now seeing oncology and was being treated with FOLFOX Pembro started in December 2021. Completed 5FU and then on Pembro alone since 9/2022 until 12/2023. Now placed on observation. Also h/o DVT and on Xarelto now.   Had PET CT scan on 9/14/23 showed minimal FDG avid wall thickening of the gastric antrum, decreased since 3/31/23 is favored to represent treated disease. New indeterminant FDG uptake in the region of the ileocecal valve. No New FDG avid disease.  Had PET CT scan on 4/29/24 showed stable gastric findings: no evidence of recurrent FDG avid malignant process in the stomach.   In regard to Type 2 DM, no polyuria and no polydipsia. Not monitoring FS at home. Stopped taking Metformin  mg daily.   UTD with ophthalmology (2/27/23) with mild nonproliferative retinopathy and dry eyes. Had foot pain and using inserts. Does have podiatrist. Taking ACE-I for proteinuria.   Stop smoking and drinks less wine. Does lots of walking.   In regard to thyroid disease, taking Synthroid 150 mcg daily. No neck complaints. Had unremarkable neck ultrasound on 12/4/19. Had total thyroidectomy and I131 in 1999. No SOB or palpitations. No heat intolerance. Reports weight loss.   In regard to vitamin D def, takes vitamin D on and off.

## 2024-06-11 NOTE — REVIEW OF SYSTEMS
[Fatigue] : fatigue [Recent Weight Loss (___ Lbs)] : recent [unfilled] ~Ulb weight loss [Dry Eyes] : dryness of the eyes [Pain/Numbness of Digits] : pain/numbness of digits [Negative] : Respiratory [All other systems negative] : All other systems negative [Recent Weight Gain (___ Lbs)] : no recent weight gain [Nausea] : no nausea [Vomiting] : no vomiting was observed [Constipation] : no constipation [Diarrhea] : no diarrhea [Abdominal Pain] : no abdominal pain [Polydipsia] : no polydipsia [Swelling] : no swelling

## 2024-06-14 ENCOUNTER — APPOINTMENT (OUTPATIENT)
Dept: PULMONOLOGY | Facility: CLINIC | Age: 69
End: 2024-06-14

## 2024-06-21 LAB
MISCELLANEOUS TEST: NORMAL
PROC NAME: NORMAL

## 2024-09-18 ENCOUNTER — OUTPATIENT (OUTPATIENT)
Dept: OUTPATIENT SERVICES | Facility: HOSPITAL | Age: 69
LOS: 1 days | Discharge: HOME | End: 2024-09-18

## 2024-09-18 DIAGNOSIS — C16.9 MALIGNANT NEOPLASM OF STOMACH, UNSPECIFIED: ICD-10-CM

## 2024-09-18 DIAGNOSIS — E89.0 POSTPROCEDURAL HYPOTHYROIDISM: Chronic | ICD-10-CM

## 2024-09-18 DIAGNOSIS — Z98.890 OTHER SPECIFIED POSTPROCEDURAL STATES: Chronic | ICD-10-CM

## 2024-09-24 ENCOUNTER — OUTPATIENT (OUTPATIENT)
Dept: OUTPATIENT SERVICES | Facility: HOSPITAL | Age: 69
LOS: 1 days | End: 2024-09-24
Payer: MEDICARE

## 2024-09-24 ENCOUNTER — APPOINTMENT (OUTPATIENT)
Dept: CT IMAGING | Facility: IMAGING CENTER | Age: 69
End: 2024-09-24
Payer: MEDICARE

## 2024-09-24 DIAGNOSIS — C16.9 MALIGNANT NEOPLASM OF STOMACH, UNSPECIFIED: ICD-10-CM

## 2024-09-24 DIAGNOSIS — E89.0 POSTPROCEDURAL HYPOTHYROIDISM: Chronic | ICD-10-CM

## 2024-09-24 DIAGNOSIS — Z98.890 OTHER SPECIFIED POSTPROCEDURAL STATES: Chronic | ICD-10-CM

## 2024-09-24 PROCEDURE — 74177 CT ABD & PELVIS W/CONTRAST: CPT | Mod: 26,MH

## 2024-09-24 PROCEDURE — 71260 CT THORAX DX C+: CPT

## 2024-09-24 PROCEDURE — 74177 CT ABD & PELVIS W/CONTRAST: CPT

## 2024-09-24 PROCEDURE — 71260 CT THORAX DX C+: CPT | Mod: 26,MH

## 2024-09-27 ENCOUNTER — APPOINTMENT (OUTPATIENT)
Dept: HEMATOLOGY ONCOLOGY | Facility: CLINIC | Age: 69
End: 2024-09-27
Payer: MEDICARE

## 2024-09-27 VITALS
OXYGEN SATURATION: 96 % | SYSTOLIC BLOOD PRESSURE: 114 MMHG | HEIGHT: 68 IN | TEMPERATURE: 98.4 F | RESPIRATION RATE: 16 BRPM | BODY MASS INDEX: 22.43 KG/M2 | WEIGHT: 148 LBS | DIASTOLIC BLOOD PRESSURE: 71 MMHG | HEART RATE: 80 BPM

## 2024-09-27 DIAGNOSIS — I82.409 ACUTE EMBOLISM AND THROMBOSIS OF UNSPECIFIED DEEP VEINS OF UNSPECIFIED LOWER EXTREMITY: ICD-10-CM

## 2024-09-27 DIAGNOSIS — C16.9 MALIGNANT NEOPLASM OF STOMACH, UNSPECIFIED: ICD-10-CM

## 2024-09-27 DIAGNOSIS — C16.9 SECONDARY MALIGNANT NEOPLASM OF UNSPECIFIED SITE: ICD-10-CM

## 2024-09-27 DIAGNOSIS — C79.9 SECONDARY MALIGNANT NEOPLASM OF UNSPECIFIED SITE: ICD-10-CM

## 2024-09-27 PROCEDURE — G2211 COMPLEX E/M VISIT ADD ON: CPT

## 2024-09-27 PROCEDURE — 99215 OFFICE O/P EST HI 40 MIN: CPT

## 2024-09-27 NOTE — HISTORY OF PRESENT ILLNESS
[Disease: _____________________] : Disease: [unfilled] [AJCC Stage: ____] : AJCC Stage: [unfilled] [de-identified] : Patient is a 67 y/o M with known PMHx of hypothyroidism secondary to thyroidectomy from previous thyroid CA and NIDDM who first presented Dr. Cox c/o of epigastric pain made worse with eating since 5/2021 that patient thought was due to drinking too much coffee.  An EGD was performed on 10/19/21 that showed a large ulcerated mass in the gastric antrum/lesser curvature not involving the pylorus.  Biopsies were taken on the mass and were positive for poorly differentiated carcinoma favoring adenocarcinoma and H. pylori.  The patient was then sent for a CT A/P with contrast that showed a distal gastric mass with appearance of transmural extension along with adjacent perigastric lymphadenopathy and upper retroperitoneal lymphadenopathy.  The patient met with Dr. Maloney on 10/26/21 who ordered a CT Chest and a PET scan.  CT Chest on 10/27/21 showed no intrathoracic metastatic disease.  PET scan is scheduled for 11/2/21.  Plan is for ex-lap with Dr. Maloney with Port placement on 11/3/21.  The patient is here today to establish oncologic care.  The patient states that since starting triple therapy for H. pylori, his abdominal pain has improved.  Denies weight loss, anorexia, N/V, diarrhea, constipation, rectal bleeding or melena.  Stage IV disease  MS-H --> FOLFOX Pembro starting Dec 2021 FLOT started 11/11/21 x 1 --> FOLFOX +pembrolizumab (oxali stopped Feb 22) 5FU Pembro  Pembro alone (since Sept 22) and completed treatment Dec 2023 then placed on observation  Port removed 6/2024 due to port induced clot [de-identified] : poorly differentiated carcinoma [de-identified] : CEA [de-identified] : her2-, CPS >1\par  MSI-High, TMB - 21 Muts/Mb\par  KIT R177H, PTEN K267fs*9, T319fs*1, APC T5452ed*29, ASXL1 G645fs*58\par  CIC Q4445ba*91, CTNNA1 N738fs*53, EPHB4 amplification, MLH1 loss, PBRM1 F6886rs*102, SMAD4 R361H, TP53 R248W  [FreeTextEntry1] :  observation since Dec 2023 [de-identified] : Patient presents for follow up while on surveillance.  He reports increased stress at home because his wife had a recurrence of her breast cancer was hospitalized for an extended period of time and is now under treatment.  He denies dysphagia, N/V, abdominal pain, change in bowel habits. His port was removed in June and he is now off of his DOAC.

## 2024-09-27 NOTE — REVIEW OF SYSTEMS
[FreeTextEntry4] : rhinorrhea [Negative] : Respiratory [Fever] : no fever [Night Sweats] : no night sweats [Dysphagia] : no dysphagia [Hoarseness] : no hoarseness [Odynophagia] : no odynophagia [Shortness Of Breath] : no shortness of breath [Wheezing] : no wheezing [Cough] : no cough [Abdominal Pain] : no abdominal pain [Vomiting] : no vomiting [FreeTextEntry2] : + weight loss

## 2024-09-27 NOTE — PHYSICAL EXAM
[Fully active, able to carry on all pre-disease performance without restriction] : Status 0 - Fully active, able to carry on all pre-disease performance without restriction [Normal] : normal appearance, no rash, nodules, vesicles, ulcers, erythema [de-identified] : anicteric  [de-identified] : no JVD [de-identified] : normal respiratory effort,   [de-identified] : reg rate  [de-identified] : non-distended

## 2024-09-27 NOTE — PHYSICAL EXAM
[Fully active, able to carry on all pre-disease performance without restriction] : Status 0 - Fully active, able to carry on all pre-disease performance without restriction [Normal] : normal appearance, no rash, nodules, vesicles, ulcers, erythema [de-identified] : anicteric  [de-identified] : no JVD [de-identified] : normal respiratory effort,   [de-identified] : reg rate  [de-identified] : non-distended

## 2024-09-27 NOTE — HISTORY OF PRESENT ILLNESS
[Disease: _____________________] : Disease: [unfilled] [AJCC Stage: ____] : AJCC Stage: [unfilled] [de-identified] : Patient is a 65 y/o M with known PMHx of hypothyroidism secondary to thyroidectomy from previous thyroid CA and NIDDM who first presented Dr. Cox c/o of epigastric pain made worse with eating since 5/2021 that patient thought was due to drinking too much coffee.  An EGD was performed on 10/19/21 that showed a large ulcerated mass in the gastric antrum/lesser curvature not involving the pylorus.  Biopsies were taken on the mass and were positive for poorly differentiated carcinoma favoring adenocarcinoma and H. pylori.  The patient was then sent for a CT A/P with contrast that showed a distal gastric mass with appearance of transmural extension along with adjacent perigastric lymphadenopathy and upper retroperitoneal lymphadenopathy.  The patient met with Dr. Maloney on 10/26/21 who ordered a CT Chest and a PET scan.  CT Chest on 10/27/21 showed no intrathoracic metastatic disease.  PET scan is scheduled for 11/2/21.  Plan is for ex-lap with Dr. Maloney with Port placement on 11/3/21.  The patient is here today to establish oncologic care.  The patient states that since starting triple therapy for H. pylori, his abdominal pain has improved.  Denies weight loss, anorexia, N/V, diarrhea, constipation, rectal bleeding or melena.  Stage IV disease  MS-H --> FOLFOX Pembro starting Dec 2021 FLOT started 11/11/21 x 1 --> FOLFOX +pembrolizumab (oxali stopped Feb 22) 5FU Pembro  Pembro alone (since Sept 22) and completed treatment Dec 2023 then placed on observation  Port removed 6/2024 due to port induced clot [de-identified] : poorly differentiated carcinoma [de-identified] : CEA [de-identified] : her2-, CPS >1\par  MSI-High, TMB - 21 Muts/Mb\par  KIT R177H, PTEN K267fs*9, T319fs*1, APC O7955ic*29, ASXL1 G645fs*58\par  CIC V5152wo*91, CTNNA1 N738fs*53, EPHB4 amplification, MLH1 loss, PBRM1 Y7386em*102, SMAD4 R361H, TP53 R248W  [FreeTextEntry1] :  observation since Dec 2023 [de-identified] : Patient presents for follow up while on surveillance.  He reports increased stress at home because his wife had a recurrence of her breast cancer was hospitalized for an extended period of time and is now under treatment.  He denies dysphagia, N/V, abdominal pain, change in bowel habits. His port was removed in June and he is now off of his DOAC.

## 2024-09-28 LAB — CEA SERPL-MCNC: 2.7 NG/ML

## 2024-11-04 ENCOUNTER — RX RENEWAL (OUTPATIENT)
Age: 69
End: 2024-11-04

## 2024-12-31 ENCOUNTER — APPOINTMENT (OUTPATIENT)
Dept: ENDOCRINOLOGY | Facility: CLINIC | Age: 69
End: 2024-12-31

## 2025-01-11 ENCOUNTER — INPATIENT (INPATIENT)
Facility: HOSPITAL | Age: 70
LOS: 3 days | Discharge: HOME | End: 2025-01-15
Attending: HOSPITALIST | Admitting: HOSPITALIST
Payer: MEDICARE

## 2025-01-11 VITALS
HEART RATE: 104 BPM | WEIGHT: 119.93 LBS | RESPIRATION RATE: 26 BRPM | OXYGEN SATURATION: 92 % | SYSTOLIC BLOOD PRESSURE: 123 MMHG | TEMPERATURE: 103 F | HEIGHT: 68 IN | DIASTOLIC BLOOD PRESSURE: 72 MMHG

## 2025-01-11 DIAGNOSIS — Z98.890 OTHER SPECIFIED POSTPROCEDURAL STATES: Chronic | ICD-10-CM

## 2025-01-11 DIAGNOSIS — J10.1 INFLUENZA DUE TO OTHER IDENTIFIED INFLUENZA VIRUS WITH OTHER RESPIRATORY MANIFESTATIONS: ICD-10-CM

## 2025-01-11 DIAGNOSIS — E89.0 POSTPROCEDURAL HYPOTHYROIDISM: Chronic | ICD-10-CM

## 2025-01-11 LAB
ADD ON TEST-SPECIMEN IN LAB: SIGNIFICANT CHANGE UP
ALBUMIN SERPL ELPH-MCNC: 4.4 G/DL — SIGNIFICANT CHANGE UP (ref 3.3–5)
ALP SERPL-CCNC: 63 U/L — SIGNIFICANT CHANGE UP (ref 40–120)
ALT FLD-CCNC: 11 U/L — SIGNIFICANT CHANGE UP (ref 4–41)
ANION GAP SERPL CALC-SCNC: 15 MMOL/L — HIGH (ref 7–14)
APPEARANCE UR: CLEAR — SIGNIFICANT CHANGE UP
APTT BLD: 38.1 SEC — HIGH (ref 24.5–35.6)
AST SERPL-CCNC: 30 U/L — SIGNIFICANT CHANGE UP (ref 4–40)
BACTERIA # UR AUTO: NEGATIVE /HPF — SIGNIFICANT CHANGE UP
BASOPHILS # BLD AUTO: 0.06 K/UL — SIGNIFICANT CHANGE UP (ref 0–0.2)
BASOPHILS NFR BLD AUTO: 1.1 % — SIGNIFICANT CHANGE UP (ref 0–2)
BILIRUB SERPL-MCNC: 0.7 MG/DL — SIGNIFICANT CHANGE UP (ref 0.2–1.2)
BILIRUB UR-MCNC: NEGATIVE — SIGNIFICANT CHANGE UP
BUN SERPL-MCNC: 15 MG/DL — SIGNIFICANT CHANGE UP (ref 7–23)
CALCIUM SERPL-MCNC: 9.3 MG/DL — SIGNIFICANT CHANGE UP (ref 8.4–10.5)
CAST: 0 /LPF — SIGNIFICANT CHANGE UP (ref 0–4)
CHLORIDE SERPL-SCNC: 99 MMOL/L — SIGNIFICANT CHANGE UP (ref 98–107)
CO2 SERPL-SCNC: 21 MMOL/L — LOW (ref 22–31)
COLOR SPEC: YELLOW — SIGNIFICANT CHANGE UP
CREAT SERPL-MCNC: 0.76 MG/DL — SIGNIFICANT CHANGE UP (ref 0.5–1.3)
DIFF PNL FLD: ABNORMAL
EGFR: 97 ML/MIN/1.73M2 — SIGNIFICANT CHANGE UP
EOSINOPHIL # BLD AUTO: 0.42 K/UL — SIGNIFICANT CHANGE UP (ref 0–0.5)
EOSINOPHIL NFR BLD AUTO: 7.5 % — HIGH (ref 0–6)
FLUAV AG NPH QL: DETECTED
FLUBV AG NPH QL: SIGNIFICANT CHANGE UP
GAS PNL BLDV: SIGNIFICANT CHANGE UP
GLUCOSE BLDC GLUCOMTR-MCNC: 103 MG/DL — HIGH (ref 70–99)
GLUCOSE BLDC GLUCOMTR-MCNC: 116 MG/DL — HIGH (ref 70–99)
GLUCOSE BLDC GLUCOMTR-MCNC: 38 MG/DL — CRITICAL LOW (ref 70–99)
GLUCOSE BLDC GLUCOMTR-MCNC: 58 MG/DL — LOW (ref 70–99)
GLUCOSE BLDC GLUCOMTR-MCNC: 59 MG/DL — LOW (ref 70–99)
GLUCOSE BLDC GLUCOMTR-MCNC: 75 MG/DL — SIGNIFICANT CHANGE UP (ref 70–99)
GLUCOSE SERPL-MCNC: 67 MG/DL — LOW (ref 70–99)
GLUCOSE UR QL: NEGATIVE MG/DL — SIGNIFICANT CHANGE UP
HCT VFR BLD CALC: 38.9 % — LOW (ref 39–50)
HGB BLD-MCNC: 13 G/DL — SIGNIFICANT CHANGE UP (ref 13–17)
IANC: 3.65 K/UL — SIGNIFICANT CHANGE UP (ref 1.8–7.4)
IMM GRANULOCYTES NFR BLD AUTO: 0.2 % — SIGNIFICANT CHANGE UP (ref 0–0.9)
INR BLD: 1.2 RATIO — HIGH (ref 0.85–1.16)
KETONES UR-MCNC: ABNORMAL MG/DL
LACTATE SERPL-SCNC: 1.9 MMOL/L — SIGNIFICANT CHANGE UP (ref 0.5–2)
LEUKOCYTE ESTERASE UR-ACNC: NEGATIVE — SIGNIFICANT CHANGE UP
LYMPHOCYTES # BLD AUTO: 1.16 K/UL — SIGNIFICANT CHANGE UP (ref 1–3.3)
LYMPHOCYTES # BLD AUTO: 20.6 % — SIGNIFICANT CHANGE UP (ref 13–44)
MCHC RBC-ENTMCNC: 27.5 PG — SIGNIFICANT CHANGE UP (ref 27–34)
MCHC RBC-ENTMCNC: 33.4 G/DL — SIGNIFICANT CHANGE UP (ref 32–36)
MCV RBC AUTO: 82.4 FL — SIGNIFICANT CHANGE UP (ref 80–100)
MONOCYTES # BLD AUTO: 0.32 K/UL — SIGNIFICANT CHANGE UP (ref 0–0.9)
MONOCYTES NFR BLD AUTO: 5.7 % — SIGNIFICANT CHANGE UP (ref 2–14)
NEUTROPHILS # BLD AUTO: 3.65 K/UL — SIGNIFICANT CHANGE UP (ref 1.8–7.4)
NEUTROPHILS NFR BLD AUTO: 64.9 % — SIGNIFICANT CHANGE UP (ref 43–77)
NITRITE UR-MCNC: NEGATIVE — SIGNIFICANT CHANGE UP
NRBC # BLD: 0 /100 WBCS — SIGNIFICANT CHANGE UP (ref 0–0)
NRBC # FLD: 0 K/UL — SIGNIFICANT CHANGE UP (ref 0–0)
PH UR: 6 — SIGNIFICANT CHANGE UP (ref 5–8)
PLATELET # BLD AUTO: 186 K/UL — SIGNIFICANT CHANGE UP (ref 150–400)
POTASSIUM SERPL-MCNC: 3.9 MMOL/L — SIGNIFICANT CHANGE UP (ref 3.5–5.3)
POTASSIUM SERPL-SCNC: 3.9 MMOL/L — SIGNIFICANT CHANGE UP (ref 3.5–5.3)
PROT SERPL-MCNC: 7.2 G/DL — SIGNIFICANT CHANGE UP (ref 6–8.3)
PROT UR-MCNC: SIGNIFICANT CHANGE UP MG/DL
PROTHROM AB SERPL-ACNC: 13.9 SEC — HIGH (ref 9.9–13.4)
RBC # BLD: 4.72 M/UL — SIGNIFICANT CHANGE UP (ref 4.2–5.8)
RBC # FLD: 13.7 % — SIGNIFICANT CHANGE UP (ref 10.3–14.5)
RBC CASTS # UR COMP ASSIST: 14 /HPF — HIGH (ref 0–4)
RSV RNA NPH QL NAA+NON-PROBE: SIGNIFICANT CHANGE UP
SARS-COV-2 RNA SPEC QL NAA+PROBE: SIGNIFICANT CHANGE UP
SODIUM SERPL-SCNC: 135 MMOL/L — SIGNIFICANT CHANGE UP (ref 135–145)
SP GR SPEC: 1.04 — HIGH (ref 1–1.03)
SQUAMOUS # UR AUTO: 0 /HPF — SIGNIFICANT CHANGE UP (ref 0–5)
TROPONIN T, HIGH SENSITIVITY RESULT: 19 NG/L — SIGNIFICANT CHANGE UP
UROBILINOGEN FLD QL: 0.2 MG/DL — SIGNIFICANT CHANGE UP (ref 0.2–1)
WBC # BLD: 5.62 K/UL — SIGNIFICANT CHANGE UP (ref 3.8–10.5)
WBC # FLD AUTO: 5.62 K/UL — SIGNIFICANT CHANGE UP (ref 3.8–10.5)
WBC UR QL: 0 /HPF — SIGNIFICANT CHANGE UP (ref 0–5)

## 2025-01-11 PROCEDURE — 71045 X-RAY EXAM CHEST 1 VIEW: CPT | Mod: 26

## 2025-01-11 PROCEDURE — 99285 EMERGENCY DEPT VISIT HI MDM: CPT | Mod: GC

## 2025-01-11 PROCEDURE — G0316 PROLONG INPT EVAL ADD15 M: CPT

## 2025-01-11 PROCEDURE — 99223 1ST HOSP IP/OBS HIGH 75: CPT

## 2025-01-11 PROCEDURE — 70450 CT HEAD/BRAIN W/O DYE: CPT | Mod: 26

## 2025-01-11 PROCEDURE — 74177 CT ABD & PELVIS W/CONTRAST: CPT | Mod: 26

## 2025-01-11 RX ORDER — DEXTROSE MONOHYDRATE 25 G/50ML
25 INJECTION, SOLUTION INTRAVENOUS ONCE
Refills: 0 | Status: DISCONTINUED | OUTPATIENT
Start: 2025-01-11 | End: 2025-01-11

## 2025-01-11 RX ORDER — KETOROLAC TROMETHAMINE 30 MG/ML
15 INJECTION INTRAMUSCULAR; INTRAVENOUS ONCE
Refills: 0 | Status: DISCONTINUED | OUTPATIENT
Start: 2025-01-11 | End: 2025-01-11

## 2025-01-11 RX ORDER — LEVOTHYROXINE SODIUM 175 UG/1
112.5 TABLET ORAL ONCE
Refills: 0 | Status: COMPLETED | OUTPATIENT
Start: 2025-01-11 | End: 2025-01-11

## 2025-01-11 RX ORDER — ACETAMINOPHEN 80 MG/.8ML
650 SOLUTION/ DROPS ORAL ONCE
Refills: 0 | Status: COMPLETED | OUTPATIENT
Start: 2025-01-11 | End: 2025-01-11

## 2025-01-11 RX ORDER — ACETAMINOPHEN 80 MG/.8ML
1000 SOLUTION/ DROPS ORAL ONCE
Refills: 0 | Status: DISCONTINUED | OUTPATIENT
Start: 2025-01-11 | End: 2025-01-11

## 2025-01-11 RX ORDER — ONDANSETRON 4 MG/1
4 TABLET ORAL ONCE
Refills: 0 | Status: COMPLETED | OUTPATIENT
Start: 2025-01-11 | End: 2025-01-11

## 2025-01-11 RX ORDER — SODIUM CHLORIDE 9 MG/ML
1000 INJECTION, SOLUTION INTRAVENOUS
Refills: 0 | Status: DISCONTINUED | OUTPATIENT
Start: 2025-01-11 | End: 2025-01-11

## 2025-01-11 RX ORDER — AZITHROMYCIN MONOHYDRATE 200 MG/5ML
500 POWDER, FOR SUSPENSION ORAL ONCE
Refills: 0 | Status: COMPLETED | OUTPATIENT
Start: 2025-01-11 | End: 2025-01-11

## 2025-01-11 RX ORDER — DEXTROSE MONOHYDRATE 25 G/50ML
50 INJECTION, SOLUTION INTRAVENOUS ONCE
Refills: 0 | Status: COMPLETED | OUTPATIENT
Start: 2025-01-11 | End: 2025-01-11

## 2025-01-11 RX ORDER — DEXTROSE MONOHYDRATE 25 G/50ML
25 INJECTION, SOLUTION INTRAVENOUS ONCE
Refills: 0 | Status: COMPLETED | OUTPATIENT
Start: 2025-01-11 | End: 2025-01-11

## 2025-01-11 RX ORDER — OSELTAMIVIR 75 MG/1
75 CAPSULE ORAL
Refills: 0 | Status: DISCONTINUED | OUTPATIENT
Start: 2025-01-11 | End: 2025-01-15

## 2025-01-11 RX ORDER — ACETAMINOPHEN 80 MG/.8ML
1000 SOLUTION/ DROPS ORAL ONCE
Refills: 0 | Status: COMPLETED | OUTPATIENT
Start: 2025-01-11 | End: 2025-01-11

## 2025-01-11 RX ORDER — PANTOPRAZOLE 40 MG/1
40 TABLET, DELAYED RELEASE ORAL
Refills: 0 | Status: DISCONTINUED | OUTPATIENT
Start: 2025-01-11 | End: 2025-01-15

## 2025-01-11 RX ORDER — DEXTROSE MONOHYDRATE 25 G/50ML
15 INJECTION, SOLUTION INTRAVENOUS ONCE
Refills: 0 | Status: DISCONTINUED | OUTPATIENT
Start: 2025-01-11 | End: 2025-01-15

## 2025-01-11 RX ORDER — AZITHROMYCIN MONOHYDRATE 200 MG/5ML
POWDER, FOR SUSPENSION ORAL
Refills: 0 | Status: DISCONTINUED | OUTPATIENT
Start: 2025-01-11 | End: 2025-01-12

## 2025-01-11 RX ORDER — CEFTRIAXONE SODIUM 1 G/1
1000 INJECTION, POWDER, FOR SOLUTION INTRAMUSCULAR; INTRAVENOUS EVERY 24 HOURS
Refills: 0 | Status: DISCONTINUED | OUTPATIENT
Start: 2025-01-11 | End: 2025-01-12

## 2025-01-11 RX ORDER — HYDROCORTISONE 100 MG/60ML
100 ENEMA RECTAL ONCE
Refills: 0 | Status: COMPLETED | OUTPATIENT
Start: 2025-01-11 | End: 2025-01-11

## 2025-01-11 RX ORDER — SODIUM CHLORIDE 9 MG/ML
500 INJECTION, SOLUTION INTRAMUSCULAR; INTRAVENOUS; SUBCUTANEOUS ONCE
Refills: 0 | Status: COMPLETED | OUTPATIENT
Start: 2025-01-11 | End: 2025-01-11

## 2025-01-11 RX ORDER — SODIUM CHLORIDE 9 MG/ML
1000 INJECTION, SOLUTION INTRAVENOUS
Refills: 0 | Status: DISCONTINUED | OUTPATIENT
Start: 2025-01-11 | End: 2025-01-15

## 2025-01-11 RX ORDER — DEXTROSE MONOHYDRATE 25 G/50ML
25 INJECTION, SOLUTION INTRAVENOUS ONCE
Refills: 0 | Status: DISCONTINUED | OUTPATIENT
Start: 2025-01-11 | End: 2025-01-15

## 2025-01-11 RX ORDER — AZITHROMYCIN MONOHYDRATE 200 MG/5ML
500 POWDER, FOR SUSPENSION ORAL EVERY 24 HOURS
Refills: 0 | Status: DISCONTINUED | OUTPATIENT
Start: 2025-01-12 | End: 2025-01-12

## 2025-01-11 RX ORDER — GLUCAGON INJECTION, SOLUTION 0.5 MG/.1ML
1 INJECTION, SOLUTION SUBCUTANEOUS ONCE
Refills: 0 | Status: DISCONTINUED | OUTPATIENT
Start: 2025-01-11 | End: 2025-01-11

## 2025-01-11 RX ORDER — LEVOTHYROXINE SODIUM 175 UG/1
150 TABLET ORAL DAILY
Refills: 0 | Status: DISCONTINUED | OUTPATIENT
Start: 2025-01-11 | End: 2025-01-15

## 2025-01-11 RX ORDER — DEXTROSE MONOHYDRATE 25 G/50ML
12.5 INJECTION, SOLUTION INTRAVENOUS ONCE
Refills: 0 | Status: DISCONTINUED | OUTPATIENT
Start: 2025-01-11 | End: 2025-01-15

## 2025-01-11 RX ORDER — SODIUM CHLORIDE 9 MG/ML
1000 INJECTION, SOLUTION INTRAMUSCULAR; INTRAVENOUS; SUBCUTANEOUS ONCE
Refills: 0 | Status: COMPLETED | OUTPATIENT
Start: 2025-01-11 | End: 2025-01-11

## 2025-01-11 RX ORDER — GLUCAGON INJECTION, SOLUTION 0.5 MG/.1ML
1 INJECTION, SOLUTION SUBCUTANEOUS ONCE
Refills: 0 | Status: DISCONTINUED | OUTPATIENT
Start: 2025-01-11 | End: 2025-01-15

## 2025-01-11 RX ORDER — DEXTROSE MONOHYDRATE 25 G/50ML
12.5 INJECTION, SOLUTION INTRAVENOUS ONCE
Refills: 0 | Status: DISCONTINUED | OUTPATIENT
Start: 2025-01-11 | End: 2025-01-11

## 2025-01-11 RX ORDER — DEXTROSE MONOHYDRATE 25 G/50ML
15 INJECTION, SOLUTION INTRAVENOUS ONCE
Refills: 0 | Status: DISCONTINUED | OUTPATIENT
Start: 2025-01-11 | End: 2025-01-11

## 2025-01-11 RX ORDER — SODIUM CHLORIDE 9 MG/ML
1000 INJECTION, SOLUTION INTRAVENOUS
Refills: 0 | Status: DISCONTINUED | OUTPATIENT
Start: 2025-01-11 | End: 2025-01-12

## 2025-01-11 RX ORDER — DEXTROSE MONOHYDRATE 25 G/50ML
12.5 INJECTION, SOLUTION INTRAVENOUS ONCE
Refills: 0 | Status: COMPLETED | OUTPATIENT
Start: 2025-01-11 | End: 2025-01-11

## 2025-01-11 RX ORDER — SODIUM CHLORIDE 9 MG/ML
1000 INJECTION, SOLUTION INTRAVENOUS ONCE
Refills: 0 | Status: COMPLETED | OUTPATIENT
Start: 2025-01-11 | End: 2025-01-11

## 2025-01-11 RX ADMIN — SODIUM CHLORIDE 150 MILLILITER(S): 9 INJECTION, SOLUTION INTRAVENOUS at 23:42

## 2025-01-11 RX ADMIN — AZITHROMYCIN MONOHYDRATE 255 MILLIGRAM(S): 200 POWDER, FOR SUSPENSION ORAL at 23:21

## 2025-01-11 RX ADMIN — DEXTROSE MONOHYDRATE 25 GRAM(S): 25 INJECTION, SOLUTION INTRAVENOUS at 21:30

## 2025-01-11 RX ADMIN — DEXTROSE MONOHYDRATE 50 MILLILITER(S): 25 INJECTION, SOLUTION INTRAVENOUS at 19:29

## 2025-01-11 RX ADMIN — SODIUM CHLORIDE 1000 MILLILITER(S): 9 INJECTION, SOLUTION INTRAVENOUS at 12:49

## 2025-01-11 RX ADMIN — DEXTROSE MONOHYDRATE 12.5 GRAM(S): 25 INJECTION, SOLUTION INTRAVENOUS at 23:14

## 2025-01-11 RX ADMIN — KETOROLAC TROMETHAMINE 15 MILLIGRAM(S): 30 INJECTION INTRAMUSCULAR; INTRAVENOUS at 14:06

## 2025-01-11 RX ADMIN — ACETAMINOPHEN 650 MILLIGRAM(S): 80 SOLUTION/ DROPS ORAL at 23:06

## 2025-01-11 RX ADMIN — DEXTROSE MONOHYDRATE 25 GRAM(S): 25 INJECTION, SOLUTION INTRAVENOUS at 20:16

## 2025-01-11 RX ADMIN — KETOROLAC TROMETHAMINE 15 MILLIGRAM(S): 30 INJECTION INTRAMUSCULAR; INTRAVENOUS at 23:14

## 2025-01-11 RX ADMIN — SODIUM CHLORIDE 1000 MILLILITER(S): 9 INJECTION, SOLUTION INTRAMUSCULAR; INTRAVENOUS; SUBCUTANEOUS at 11:06

## 2025-01-11 RX ADMIN — ONDANSETRON 4 MILLIGRAM(S): 4 TABLET ORAL at 11:03

## 2025-01-11 RX ADMIN — SODIUM CHLORIDE 1000 MILLILITER(S): 9 INJECTION, SOLUTION INTRAMUSCULAR; INTRAVENOUS; SUBCUTANEOUS at 10:44

## 2025-01-11 RX ADMIN — SODIUM CHLORIDE 500 MILLILITER(S): 9 INJECTION, SOLUTION INTRAMUSCULAR; INTRAVENOUS; SUBCUTANEOUS at 16:11

## 2025-01-11 RX ADMIN — SODIUM CHLORIDE 75 MILLILITER(S): 9 INJECTION, SOLUTION INTRAVENOUS at 20:28

## 2025-01-11 RX ADMIN — SODIUM CHLORIDE 150 MILLILITER(S): 9 INJECTION, SOLUTION INTRAVENOUS at 21:46

## 2025-01-11 RX ADMIN — ACETAMINOPHEN 400 MILLIGRAM(S): 80 SOLUTION/ DROPS ORAL at 10:50

## 2025-01-11 RX ADMIN — OSELTAMIVIR 75 MILLIGRAM(S): 75 CAPSULE ORAL at 21:46

## 2025-01-11 NOTE — H&P ADULT - PROBLEM SELECTOR PLAN 5
- med list provided by khari at bedside on phone  - will email med hx pharmacy to confirm that he is not prescribed any DM meds

## 2025-01-11 NOTE — H&P ADULT - ASSESSMENT
70 y/o M with pmhx of DM2, HTN, Stomach cancer, Thyroid cancer, presented to the ED for new onset altered mental status. Found to have flu positive. Also septic. Admit for IV abx and flu management, hypoglycemia work up.

## 2025-01-11 NOTE — PROVIDER CONTACT NOTE (HYPOGLYCEMIA EVENT) - NS PROVIDER CONTACT ASSESS-HYPO
Pt had hypoglycemia event in AM and report of hypoglycemia was missed in handoff. Pt had extended episode of hypoglycemia. Pt given Dextrose 50% IV Push and ACP made aware. Hypoglycemia event escalated to ANM. Pt now resting in stretcher AOx3, says he feels tired. Pt denies pain, chest pain or SOB. Pt also given food and assisted to eat.

## 2025-01-11 NOTE — H&P ADULT - PROBLEM SELECTOR PLAN 3
- the patient has persistent hypoglycemia of unclear etiology at this time  - hx of DM in the past but currently not on any DM medications or insulin  - has failed multiple amps of D50 and D5 drip  - endocrine consulted overnight  - will treat empirically for possible adrenal insufficiency - will give a dose of solu-cortef  - labs for hypoglycemia work up pending

## 2025-01-11 NOTE — ED PROVIDER NOTE - OBJECTIVE STATEMENT
69-year-old male patient past medical history DM type II, HTN, stomach cancer (currently in remission for 2 years) brought in by daughters presents to ED for altered mental status since this morning.  Daughter arrived at house and saw her father on the ground from his bed.  Patient was awake however confused and unable to respond to questions.  Last seen normal was yesterday and was mentating at baseline alert and oriented x 4.  Patient was vomiting at home prior to arrival.  As per daughter patient's wife who lives with him has not endorsed any shortness of breath, no chest pain, no abdominal pain, no diarrhea, no flulike symptoms.

## 2025-01-11 NOTE — ED PROVIDER NOTE - CLINICAL SUMMARY MEDICAL DECISION MAKING FREE TEXT BOX
Pt is febrile, tachy, normotensive, saturating well on RA. Sepsis workup, cxr, blood cultures, UA, ct abdomen/pelvis and ct head. dispo pending labs and imaging.

## 2025-01-11 NOTE — ED ADULT NURSE REASSESSMENT NOTE - NS ED NURSE REASSESS COMMENT FT1
Report received from AYUSH Power, pt A&Ox1, noted to be lethargic. resting in stretcher, family at bedside, RR even and unlabored on 3 L NC, spo2 100%, remains on cardiac monitor, noted to be NSR. Pending CT. safety maintained.

## 2025-01-11 NOTE — ED ADULT NURSE REASSESSMENT NOTE - NS ED NURSE REASSESS COMMENT FT1
Emergency Department Provider Note       PCP: Flaquito Smart Jr., MD   Age: 23 y.o.   Sex: male     DISPOSITION Decision To Discharge 06/10/2024 09:10:46 PM       ICD-10-CM    1. Eczema, unspecified type  L30.9       2. Bilateral impacted cerumen  H61.23           Medical Decision Making     Patient does not have his glasses with him tonight for the visual acuity so he is at his baseline.  He also has not noticed any visual changes.  The ears will be irrigated as I cannot see the eardrums bilaterally.  There is extensive earwax.  CAROILNA Roblero irrigated both ears, the right one is two thirds clear and I can see the eardrum, the left one still has some earwax however there is no pain to the tragus, anterior posterior ear or the ear canal.  No lymphadenopathy.  He will be sent home with the Debrox eardrops to use at home and advised not to use Q-tips.  As for the eczematous area around his eyes, it does not involve the eyeball itself.  I will prescribe a Medrol Dosepak as he does have other patches he said that if, for around his body and are very itchy.  He was told to avoid itching.  Use moisturizing lotion such as Goldbond or Aveeno and to follow-up with his primary care physician for recheck.  He may need allergy testing done.  Drink plenty of water.  All of his questions were answered.  He is stable for discharge and ambulatory out of the ED without difficulty at this time.     1 or more acute illnesses that pose a threat to life or bodily function.   Over the counter drug management performed.  Prescription drug management performed.  Shared medical decision making was utilized in creating the patients health plan today.  Considerations: The following items were considered but not ordered: Further evaluation.    I independently ordered and reviewed each unique test.  I reviewed external records: ED visit note from an outside group.   The patients assessment required an independent historian: None.  The reason  pt A&Ox1, family at bedside, resting in stretcher, RR even and unlabored on 3 L NC, spo2 100%, remains on cardiac monitor noted to be NSR. BP normotensive, MD Mcknight made aware. Afebrile orally. Pending admission. safety maintained.

## 2025-01-11 NOTE — ED ADULT NURSE REASSESSMENT NOTE - NS ED NURSE REASSESS COMMENT FT1
Pt with second episode of hypoglycemia. ACP called and made aware. Second amp of dextrose given and pt placed on D5/NS IV fluids. Pt sitting up in bed and eating dinner with assistance.

## 2025-01-11 NOTE — H&P ADULT - HISTORY OF PRESENT ILLNESS
This is a 68 y/o M with pmhx of DM2, HTN, Stomach cancer, Thyroid cancer, presented to the ED for new onset  This is a 68 y/o M with pmhx of DM2, HTN, Stomach cancer, Thyroid cancer, presented to the ED for new onset altered mental status. The patient was found on the ground this morning next to his bed. Family at bedside, stated that he slipped on the bed and landed on the ground. Also has recently poor PO intake in the last few days. Denies head trauma. Denies fevers at home at this time. Denies cough or shortness of breath as per family member. The patient was normal and conversive the day prior to the event. The patient at bedside was lethargic appearing and can only answer yes/no questions.    The patient has a hx of total thyroidectomy, and did not take his synthroid medication in the AM. He also is not taking any DM medications or insulin at home.  This is a 68 y/o M with pmhx of DM2, HTN, Stomach cancer, Thyroid cancer, presented to the ED for new onset altered mental status. The patient was found on the ground this morning next to his bed. Family at bedside, stated that he slipped on the bed and landed on the ground. Also has recently poor PO intake in the last few days. Denies head trauma. Denies fevers at home at this time. Denies cough or shortness of breath as per family member. The patient was normal and conversive the day prior to the event. The patient at bedside was lethargic appearing and can only answer yes/no questions.    The patient has a hx of total thyroidectomy, and did not take his synthroid medication in the AM. He also is not taking any DM medications or insulin at home. Patient also urinated into bottle at bedside

## 2025-01-11 NOTE — H&P ADULT - NSHPPHYSICALEXAM_GEN_ALL_CORE
PHYSICAL EXAM:  VITALS: Vital Signs Last 24 Hrs  T(C): 38.9 (12 Jan 2025 00:09), Max: 39.6 (11 Jan 2025 10:51)  T(F): 102.1 (12 Jan 2025 00:09), Max: 103.2 (11 Jan 2025 10:51)  HR: 91 (11 Jan 2025 23:54) (73 - 104)  BP: 92/73 (11 Jan 2025 23:54) (80/61 - 125/92)  BP(mean): --  RR: 23 (11 Jan 2025 23:54) (18 - 26)  SpO2: 95% (11 Jan 2025 23:54) (92% - 100%)    Parameters below as of 11 Jan 2025 23:54  Patient On (Oxygen Delivery Method): nasal cannula  O2 Flow (L/min): 6    GENERAL: NAD, comfortable at bedside, lethargic appearing  HEAD:  Atraumatic, Normocephalic  EYES: EOMI, PERRL, conjunctiva and sclera clear  ENT: Moist Mucus Membranes present, no ulcers appreciated  NECK: Supple, No JVD  CHEST/LUNG: rhonchi in the basilar areas b/l lung fields, no accessory muscle use  HEART: Regular rate and rhythm; No murmurs, rubs, or gallops, (+)S1, S2  ABDOMEN: Soft, Nontender, Nondistended; Normal Bowel sounds, no suprapubic tenderness noted  EXTREMITIES: No clubbing, cyanosis, or edema  PSYCH: unable to assess mood and affect  NEUROLOGY: AAOx2, non-focal, lethargic appearing  SKIN: No rashes or lesions

## 2025-01-11 NOTE — ED ADULT TRIAGE NOTE - CHIEF COMPLAINT QUOTE
pt weak not speaking, opening eye to verbal stimuli with  n/v staring today with fever, apparent urinary incontinence noted in triage past med stomach CA- remission x 2 years

## 2025-01-11 NOTE — H&P ADULT - NSHPLABSRESULTS_GEN_ALL_CORE
13.0   5.62  )-----------( 186      ( 11 Jan 2025 10:33 )             38.9       01-11    137  |  105  |  15  ----------------------------<  159[H]  3.9   |  21[L]  |  0.77    Ca    7.8[L]      11 Jan 2025 23:43  Phos  3.7     01-11  Mg     1.40     01-11    TPro  7.2  /  Alb  4.4  /  TBili  0.7  /  DBili  x   /  AST  30  /  ALT  11  /  AlkPhos  63  01-11            PT/INR - ( 11 Jan 2025 10:33 )   PT: 13.9 sec;   INR: 1.20 ratio         PTT - ( 11 Jan 2025 10:33 )  PTT:38.1 sec    23:43 - VBG - pH: 7.28  | pCO2: 53    | pO2: 41    | Lactate: 1.3    10:33 - VBG - pH: 7.33  | pCO2: 48    | pO2: 33    | Lactate: 1.8        Urinalysis Basic - ( 11 Jan 2025 23:43 )    Color: x / Appearance: x / SG: x / pH: x  Gluc: 159 mg/dL / Ketone: x  / Bili: x / Urobili: x   Blood: x / Protein: x / Nitrite: x   Leuk Esterase: x / RBC: x / WBC x   Sq Epi: x / Non Sq Epi: x / Bacteria: x          Urinalysis with Rflx Culture (collected 11 Jan 2025 15:00)        CXR: As per my read - b/l lower hazy opacities, Will wait for prelim/official read    EKG: As per my read - sinus tach 103 bpm, QTc 419 ms    CT:  IMPRESSION:  1. Large airway disease at the bibasilar lungs.  2. No acute intra-abdominal pathology.  3. Subcentimeter nonobstructing stone in the left kidney. No   hydronephrosis.

## 2025-01-11 NOTE — CHART NOTE - NSCHARTNOTEFT_GEN_A_CORE
Consult received for hypoglycemia    69M with reported hx of DM type II, HTN, stomach cancer (in remission for 2 years) brought in by daughters presents to ED for altered mental status since this morning.    Per primary team, patient is not on any diabetic medications.  Since arrival BG has been low to 60s. Has received several amps of D50. Currently on D5W with recurrent drops in BG.  Vitals stable.    Preliminary recommendations:  - Check A1c  - Check Sulfonylurea panel  - Check serum AM cortisol at 8AM tomorrow morning (if primary team decides to start steroids, recommend to check serum cortisol level prior to starting steroids).  - Check TSH  - if FSG <60 or if patient has symptoms of hypoglycemia, PRIOR TO TREATING please draw the following STAT LABS to better assess the cause of hypoglycemia, so we can determine the best treatment:  1) BMP  2) serum insulin  3) c-peptide  4) pro-insulin  5) Insulin antibodies  6) Beta hydroxybutyrate  - Can start D10 if recurrent hypoglycemia on high rates of D5W.    Michael Vaz MD  Endocrine Fellow  Can be reached via teams. For follow up questions, discharge recommendations, or new consults, please call answering service at [phone] (weekdays); [phone] (nights/weekends). Consult received for hypoglycemia    69M with reported hx of DM type 2, hypothyroidism, HTN, stomach cancer (in remission for 2 years) brought in by daughters presents to ED for altered mental status since this morning.    Per primary team, patient is not on any diabetic medications.  Since arrival BG has been low to 60s. Has received several amps of D50. Currently on D5W with recurrent drops in BG.  Vitals stable.    Preliminary recommendations:  - Check A1c  - Check Sulfonylurea panel  - Check serum AM cortisol at 8AM tomorrow morning (if primary team decides to start steroids, recommend to check serum cortisol level prior to starting steroids).  - Check TSH, Free T4  - if FSG <60 or if patient has symptoms of hypoglycemia, PRIOR TO TREATING please draw the following STAT LABS to better assess the cause of hypoglycemia, so we can determine the best treatment:  1) BMP  2) serum insulin  3) c-peptide  4) pro-insulin  5) Insulin antibodies  6) Beta hydroxybutyrate  - Can start D10 if recurrent hypoglycemia on high rates of D5W.    Michael Vaz MD  Endocrine Fellow  Can be reached via teams. For follow up questions, discharge recommendations, or new consults, please call answering service at [phone] (weekdays); [phone] (nights/weekends). Consult received for hypoglycemia    69M with reported hx of DM type 2, hypothyroidism, HTN, stomach cancer (in remission for 2 years) brought in by daughters presents to ED for altered mental status since this morning.    Per primary team, patient is not on any diabetic medications.  Since arrival BG has been low to 60s. Has received several amps of D50. Currently on D5W with recurrent drops in BG.  Vitals stable.    Preliminary recommendations:  - Check A1c  - Check Sulfonylurea panel  - Check serum AM cortisol at 8AM tomorrow morning (if primary team decides to start steroids, recommend to check serum cortisol level prior to starting steroids).  - Check TSH, Free T4  - if FSG <60 or if patient has symptoms of hypoglycemia, PRIOR TO TREATING please draw the following STAT LABS to better assess the cause of hypoglycemia, so we can determine the best treatment:  1) BMP  2) serum insulin  3) c-peptide  4) pro-insulin  5) Insulin antibodies  6) Beta hydroxybutyrate  - Can start D10 if recurrent hypoglycemia on high rates of D5W.  - Ensure hypoglycemia protocol orders are in place    Michael Vaz MD  Endocrine Fellow  Can be reached via teams. For follow up questions, discharge recommendations, or new consults, please call answering service at [phone] (weekdays); [phone] (nights/weekends). Consult received for hypoglycemia    69M with reported hx of DM type 2, hypothyroidism, HTN, stomach cancer (in remission for 2 years) brought in by daughters presents to ED for altered mental status since this morning.    Per primary team, patient is not on any diabetic medications.  Since arrival BG has been low to 60s. Has received several amps of D50. Currently on D5W with recurrent drops in BG.  Vitals stable.    Preliminary recommendations:  - Check A1c  - Check Sulfonylurea panel  - Check serum AM cortisol at 8AM tomorrow morning (if primary team decides to start steroids, recommend to check serum cortisol level prior to starting steroids).  - Check TSH, Free T4  - if FSG <70 or if patient has symptoms of hypoglycemia, PRIOR TO TREATING please draw the following STAT LABS to better assess the cause of hypoglycemia, so we can determine the best treatment:  1) BMP  2) serum insulin  3) c-peptide  4) pro-insulin  5) Insulin antibodies  6) Beta hydroxybutyrate  - Can start D10 if recurrent hypoglycemia on high rates of D5W.  - Ensure hypoglycemia protocol orders are in place  - Please obtain home medication list     Michael Vaz MD  Endocrine Fellow  Can be reached via teams. For follow up questions, discharge recommendations, or new consults, please call answering service at [phone] (weekdays); [phone] (nights/weekends).

## 2025-01-11 NOTE — ED ADULT NURSE REASSESSMENT NOTE - NS ED NURSE REASSESS COMMENT FT1
Pt alert and orientedx4, resting in stretcher.  Pt denies chest pain, SOB, lightheadedness, pain. Pt had episode of hypoglycemia. Pt given 50% Dextrose IV and now POCT glucose is 120. ACP/ provider and ANM made aware.

## 2025-01-11 NOTE — ED ADULT NURSE REASSESSMENT NOTE - NS ED NURSE REASSESS COMMENT FT1
Provider made aware that pt meets sepsis criteria and needs ABX ordered. No need for abx at this time as per ED provider as pt does not have an elevated lactate or WBC count.

## 2025-01-11 NOTE — ED ADULT NURSE REASSESSMENT NOTE - NS ED NURSE REASSESS COMMENT FT1
Pt A&Ox2, pt noted to be rectally febrile 103.0, suppository Tylenol and IV tordol given as ordered, placed on cooling blanket. Noted to desat to 85% on 3 L NC, increased o2 to 6 L, with sat increased to 95%. Blood glucose noted to be 58, 12.5 grams D50 given IV push. will repeat fingerstick. Admitting PA at bedside for evaluation.

## 2025-01-11 NOTE — ED ADULT NURSE REASSESSMENT NOTE - NS ED NURSE REASSESS COMMENT FT1
pt noted to be mildly hypotensive, MD Mcknight made aware. medicated as ordered. will repeat BP. safety maintained.

## 2025-01-11 NOTE — ED ADULT NURSE NOTE - OBJECTIVE STATEMENT
Patient coming to ED for fever and AMS starting today. Patient is alert and responsive to voice, non-verbal at this time. Noted with Rectal temp 103.2. Family states patient was found on floor this morning. Labs sent. Right 20G IVL in place and tolerating well. Patient has history of Stomach CA, on remission. IVF in place. IV tylenol given and tolerated well. Condom cath in place as per family request. Plan of care in progress. Patient coming to ED for fever and AMS starting today. Patient is alert and responsive to voice, non-verbal at this time. Noted with Rectal temp 103.2. Family states patient was found on floor this morning. Labs sent. Right 20G IVL in place and tolerating well. Patient has history of Stomach CA, on remission. IVF in place. IV tylenol given and tolerated well. FS 65 in triage. Dr Marquez aware. Continue to monitor at this time. Condom cath in place as per family request. Plan of care in progress.

## 2025-01-11 NOTE — ED PROVIDER NOTE - ATTENDING CONTRIBUTION TO CARE
68 yo M with h/o DM type II, HTN, stomach cancer (currently in remission for 2 years), thyroid cancer who presents to the ED with altered mental status. per pt's family at bedside they report that last night the pt was in his usual state of health. This morning they found him on the ground and altered/confused. Also had multiple episodes of emesis this morning. No known diarrhea, cough, congestion. Pt moving all extremities, saying yes/no to some questions. On arrival to the ED, pt tachycardic and febrile, concern for possible infection. Septic work-up initiated including cultures, fluids. Will also obtain CT abd/pelvis given vomiting and history of stomach cancer as well as CT head

## 2025-01-11 NOTE — H&P ADULT - PROBLEM SELECTOR PLAN 4
- patient with hx of hypothyroidism secondary to complete thyroidectomy  - family member endorsed that he did not take his synthroid dose  - will give STAT dose of IV synthroid - could be the cause of his hypoglycemia in the setting of sepsis and influenza A  - f/u Free T4, TSH  - endocrine consult as above

## 2025-01-11 NOTE — ED ADULT NURSE REASSESSMENT NOTE - NS ED NURSE REASSESS COMMENT FT1
Fingerstick noted to be 75 at 21:14, MD Huggins at bedside. 25 grams D50 given IV push as ordered. repeat fingerstick noted to be 116. safety maintained.

## 2025-01-11 NOTE — H&P ADULT - PROBLEM SELECTOR PLAN 1
- patient presented with fever and low blood pressure, concerning for sepsis  - CT scan only shows basilar bronchial wall thickening  - will cover empirically with abx  - sputum cultures pending, BCx pending  - Vital signs Q4hr

## 2025-01-11 NOTE — ED ADULT NURSE REASSESSMENT NOTE - NS ED NURSE REASSESS COMMENT FT1
Pt resting in stretcher, noted to be hypotensive. MD Osorio aware. medicated as ordered. repeat BP improved to 102/56. RR even and unlabored on 3 L NC, spo2 97%. safety maintained.

## 2025-01-12 DIAGNOSIS — E27.49 OTHER ADRENOCORTICAL INSUFFICIENCY: ICD-10-CM

## 2025-01-12 DIAGNOSIS — Z79.899 OTHER LONG TERM (CURRENT) DRUG THERAPY: ICD-10-CM

## 2025-01-12 DIAGNOSIS — Z29.9 ENCOUNTER FOR PROPHYLACTIC MEASURES, UNSPECIFIED: ICD-10-CM

## 2025-01-12 DIAGNOSIS — A41.9 SEPSIS, UNSPECIFIED ORGANISM: ICD-10-CM

## 2025-01-12 DIAGNOSIS — Z92.89 PERSONAL HISTORY OF OTHER MEDICAL TREATMENT: ICD-10-CM

## 2025-01-12 DIAGNOSIS — E03.9 HYPOTHYROIDISM, UNSPECIFIED: ICD-10-CM

## 2025-01-12 DIAGNOSIS — J18.9 PNEUMONIA, UNSPECIFIED ORGANISM: ICD-10-CM

## 2025-01-12 DIAGNOSIS — E16.2 HYPOGLYCEMIA, UNSPECIFIED: ICD-10-CM

## 2025-01-12 DIAGNOSIS — E27.40 UNSPECIFIED ADRENOCORTICAL INSUFFICIENCY: ICD-10-CM

## 2025-01-12 DIAGNOSIS — J10.1 INFLUENZA DUE TO OTHER IDENTIFIED INFLUENZA VIRUS WITH OTHER RESPIRATORY MANIFESTATIONS: ICD-10-CM

## 2025-01-12 LAB
A1C WITH ESTIMATED AVERAGE GLUCOSE RESULT: 4.9 % — SIGNIFICANT CHANGE UP (ref 4–5.6)
ANION GAP SERPL CALC-SCNC: 11 MMOL/L — SIGNIFICANT CHANGE UP (ref 7–14)
ANION GAP SERPL CALC-SCNC: 11 MMOL/L — SIGNIFICANT CHANGE UP (ref 7–14)
ANISOCYTOSIS BLD QL: SLIGHT — SIGNIFICANT CHANGE UP
APPEARANCE UR: CLEAR — SIGNIFICANT CHANGE UP
B-OH-BUTYR SERPL-SCNC: <0 MMOL/L — SIGNIFICANT CHANGE UP (ref 0–0.4)
BACTERIA # UR AUTO: NEGATIVE /HPF — SIGNIFICANT CHANGE UP
BILIRUB UR-MCNC: NEGATIVE — SIGNIFICANT CHANGE UP
BLOOD GAS VENOUS COMPREHENSIVE RESULT: SIGNIFICANT CHANGE UP
BUN SERPL-MCNC: 15 MG/DL — SIGNIFICANT CHANGE UP (ref 7–23)
BUN SERPL-MCNC: 16 MG/DL — SIGNIFICANT CHANGE UP (ref 7–23)
C PEPTIDE SERPL-MCNC: 1.9 NG/ML — SIGNIFICANT CHANGE UP (ref 1.1–4.4)
CALCIUM SERPL-MCNC: 7.8 MG/DL — LOW (ref 8.4–10.5)
CALCIUM SERPL-MCNC: 8.3 MG/DL — LOW (ref 8.4–10.5)
CAST: 0 /LPF — SIGNIFICANT CHANGE UP (ref 0–4)
CHLORIDE SERPL-SCNC: 103 MMOL/L — SIGNIFICANT CHANGE UP (ref 98–107)
CHLORIDE SERPL-SCNC: 105 MMOL/L — SIGNIFICANT CHANGE UP (ref 98–107)
CO2 SERPL-SCNC: 21 MMOL/L — LOW (ref 22–31)
CO2 SERPL-SCNC: 23 MMOL/L — SIGNIFICANT CHANGE UP (ref 22–31)
COLOR SPEC: YELLOW — SIGNIFICANT CHANGE UP
CORTIS SERPL-MCNC: 0.9 UG/DL — SIGNIFICANT CHANGE UP
CORTIS SERPL-MCNC: 73.2 UG/DL — SIGNIFICANT CHANGE UP
CREAT SERPL-MCNC: 0.77 MG/DL — SIGNIFICANT CHANGE UP (ref 0.5–1.3)
CREAT SERPL-MCNC: 0.77 MG/DL — SIGNIFICANT CHANGE UP (ref 0.5–1.3)
DIFF PNL FLD: ABNORMAL
EGFR: 97 ML/MIN/1.73M2 — SIGNIFICANT CHANGE UP
EGFR: 97 ML/MIN/1.73M2 — SIGNIFICANT CHANGE UP
ELLIPTOCYTES BLD QL SMEAR: SLIGHT — SIGNIFICANT CHANGE UP
ESTIMATED AVERAGE GLUCOSE: 94 — SIGNIFICANT CHANGE UP
GLUCOSE BLDC GLUCOMTR-MCNC: 111 MG/DL — HIGH (ref 70–99)
GLUCOSE BLDC GLUCOMTR-MCNC: 113 MG/DL — HIGH (ref 70–99)
GLUCOSE BLDC GLUCOMTR-MCNC: 115 MG/DL — HIGH (ref 70–99)
GLUCOSE BLDC GLUCOMTR-MCNC: 161 MG/DL — HIGH (ref 70–99)
GLUCOSE BLDC GLUCOMTR-MCNC: 190 MG/DL — HIGH (ref 70–99)
GLUCOSE SERPL-MCNC: 159 MG/DL — HIGH (ref 70–99)
GLUCOSE SERPL-MCNC: 187 MG/DL — HIGH (ref 70–99)
GLUCOSE UR QL: NEGATIVE MG/DL — SIGNIFICANT CHANGE UP
HCT VFR BLD CALC: 32.8 % — LOW (ref 39–50)
HGB BLD-MCNC: 11 G/DL — LOW (ref 13–17)
KETONES UR-MCNC: NEGATIVE MG/DL — SIGNIFICANT CHANGE UP
LEGIONELLA AG UR QL: NEGATIVE — SIGNIFICANT CHANGE UP
LEUKOCYTE ESTERASE UR-ACNC: NEGATIVE — SIGNIFICANT CHANGE UP
MAGNESIUM SERPL-MCNC: 1.4 MG/DL — LOW (ref 1.6–2.6)
MAGNESIUM SERPL-MCNC: 1.9 MG/DL — SIGNIFICANT CHANGE UP (ref 1.6–2.6)
MCHC RBC-ENTMCNC: 28.1 PG — SIGNIFICANT CHANGE UP (ref 27–34)
MCHC RBC-ENTMCNC: 33.5 G/DL — SIGNIFICANT CHANGE UP (ref 32–36)
MCV RBC AUTO: 83.9 FL — SIGNIFICANT CHANGE UP (ref 80–100)
NEUTS BAND # BLD: 5.3 % — SIGNIFICANT CHANGE UP (ref 0–6)
NITRITE UR-MCNC: NEGATIVE — SIGNIFICANT CHANGE UP
PH UR: 5.5 — SIGNIFICANT CHANGE UP (ref 5–8)
PHOSPHATE SERPL-MCNC: 3.7 MG/DL — SIGNIFICANT CHANGE UP (ref 2.5–4.5)
PHOSPHATE SERPL-MCNC: 4.3 MG/DL — SIGNIFICANT CHANGE UP (ref 2.5–4.5)
PLAT MORPH BLD: NORMAL — SIGNIFICANT CHANGE UP
PLATELET # BLD AUTO: 138 K/UL — LOW (ref 150–400)
PLATELET COUNT - ESTIMATE: ABNORMAL
POIKILOCYTOSIS BLD QL AUTO: SLIGHT — SIGNIFICANT CHANGE UP
POTASSIUM SERPL-MCNC: 3.9 MMOL/L — SIGNIFICANT CHANGE UP (ref 3.5–5.3)
POTASSIUM SERPL-MCNC: 4.9 MMOL/L — SIGNIFICANT CHANGE UP (ref 3.5–5.3)
POTASSIUM SERPL-SCNC: 3.9 MMOL/L — SIGNIFICANT CHANGE UP (ref 3.5–5.3)
POTASSIUM SERPL-SCNC: 4.9 MMOL/L — SIGNIFICANT CHANGE UP (ref 3.5–5.3)
PROT UR-MCNC: NEGATIVE MG/DL — SIGNIFICANT CHANGE UP
RBC # BLD: 3.91 M/UL — LOW (ref 4.2–5.8)
RBC # FLD: 13.6 % — SIGNIFICANT CHANGE UP (ref 10.3–14.5)
RBC BLD AUTO: NORMAL — SIGNIFICANT CHANGE UP
RBC CASTS # UR COMP ASSIST: 4 /HPF — SIGNIFICANT CHANGE UP (ref 0–4)
S PNEUM AG UR QL: NEGATIVE — SIGNIFICANT CHANGE UP
SODIUM SERPL-SCNC: 137 MMOL/L — SIGNIFICANT CHANGE UP (ref 135–145)
SODIUM SERPL-SCNC: 137 MMOL/L — SIGNIFICANT CHANGE UP (ref 135–145)
SP GR SPEC: 1.02 — SIGNIFICANT CHANGE UP (ref 1–1.03)
SQUAMOUS # UR AUTO: 0 /HPF — SIGNIFICANT CHANGE UP (ref 0–5)
T4 FREE SERPL-MCNC: 0.8 NG/DL — LOW (ref 0.9–1.7)
TSH SERPL-MCNC: 1.05 UIU/ML — SIGNIFICANT CHANGE UP (ref 0.27–4.2)
TSH SERPL-MCNC: 3.18 UIU/ML — SIGNIFICANT CHANGE UP (ref 0.27–4.2)
UROBILINOGEN FLD QL: 0.2 MG/DL — SIGNIFICANT CHANGE UP (ref 0.2–1)
VARIANT LYMPHS # BLD: 3.5 % — SIGNIFICANT CHANGE UP (ref 0–6)
WBC # BLD: 2.38 K/UL — LOW (ref 3.8–10.5)
WBC # FLD AUTO: 2.38 K/UL — LOW (ref 3.8–10.5)
WBC UR QL: 0 /HPF — SIGNIFICANT CHANGE UP (ref 0–5)

## 2025-01-12 PROCEDURE — 71250 CT THORAX DX C-: CPT | Mod: 26

## 2025-01-12 PROCEDURE — 99233 SBSQ HOSP IP/OBS HIGH 50: CPT | Mod: FS

## 2025-01-12 PROCEDURE — 99291 CRITICAL CARE FIRST HOUR: CPT

## 2025-01-12 PROCEDURE — 99223 1ST HOSP IP/OBS HIGH 75: CPT

## 2025-01-12 PROCEDURE — 99284 EMERGENCY DEPT VISIT MOD MDM: CPT | Mod: GC

## 2025-01-12 RX ORDER — HYDROCORTISONE 100 MG/60ML
100 ENEMA RECTAL EVERY 8 HOURS
Refills: 0 | Status: DISCONTINUED | OUTPATIENT
Start: 2025-01-12 | End: 2025-01-13

## 2025-01-12 RX ORDER — LIDOCAINE 50 MG/G
1 OINTMENT TOPICAL ONCE
Refills: 0 | Status: COMPLETED | OUTPATIENT
Start: 2025-01-12 | End: 2025-01-12

## 2025-01-12 RX ORDER — PIPERACILLIN AND TAZOBACTAM 3; .375 G/15ML; G/15ML
3.38 INJECTION, POWDER, LYOPHILIZED, FOR SOLUTION INTRAVENOUS EVERY 8 HOURS
Refills: 0 | Status: DISCONTINUED | OUTPATIENT
Start: 2025-01-12 | End: 2025-01-15

## 2025-01-12 RX ORDER — VANCOMYCIN HYDROCHLORIDE 5 G/100ML
750 INJECTION, POWDER, LYOPHILIZED, FOR SOLUTION INTRAVENOUS EVERY 12 HOURS
Refills: 0 | Status: DISCONTINUED | OUTPATIENT
Start: 2025-01-12 | End: 2025-01-13

## 2025-01-12 RX ORDER — MAGNESIUM SULFATE 500 MG/ML
1 INJECTION, SOLUTION INTRAMUSCULAR; INTRAVENOUS ONCE
Refills: 0 | Status: COMPLETED | OUTPATIENT
Start: 2025-01-12 | End: 2025-01-12

## 2025-01-12 RX ORDER — MIDODRINE HYDROCHLORIDE 5 MG/1
10 TABLET ORAL ONCE
Refills: 0 | Status: COMPLETED | OUTPATIENT
Start: 2025-01-12 | End: 2025-01-12

## 2025-01-12 RX ORDER — PIPERACILLIN AND TAZOBACTAM 3; .375 G/15ML; G/15ML
3.38 INJECTION, POWDER, LYOPHILIZED, FOR SOLUTION INTRAVENOUS ONCE
Refills: 0 | Status: COMPLETED | OUTPATIENT
Start: 2025-01-12 | End: 2025-01-12

## 2025-01-12 RX ORDER — SODIUM CHLORIDE 9 MG/ML
1000 INJECTION, SOLUTION INTRAVENOUS
Refills: 0 | Status: DISCONTINUED | OUTPATIENT
Start: 2025-01-12 | End: 2025-01-15

## 2025-01-12 RX ORDER — HEPARIN SODIUM 1000 [USP'U]/ML
5000 INJECTION, SOLUTION INTRAVENOUS; SUBCUTANEOUS EVERY 12 HOURS
Refills: 0 | Status: DISCONTINUED | OUTPATIENT
Start: 2025-01-12 | End: 2025-01-15

## 2025-01-12 RX ORDER — VANCOMYCIN HYDROCHLORIDE 5 G/100ML
750 INJECTION, POWDER, LYOPHILIZED, FOR SOLUTION INTRAVENOUS EVERY 12 HOURS
Refills: 0 | Status: DISCONTINUED | OUTPATIENT
Start: 2025-01-12 | End: 2025-01-12

## 2025-01-12 RX ORDER — MIDODRINE HYDROCHLORIDE 5 MG/1
10 TABLET ORAL EVERY 8 HOURS
Refills: 0 | Status: DISCONTINUED | OUTPATIENT
Start: 2025-01-12 | End: 2025-01-13

## 2025-01-12 RX ORDER — SODIUM CHLORIDE 9 MG/ML
1000 INJECTION, SOLUTION INTRAVENOUS ONCE
Refills: 0 | Status: COMPLETED | OUTPATIENT
Start: 2025-01-12 | End: 2025-01-12

## 2025-01-12 RX ADMIN — HEPARIN SODIUM 5000 UNIT(S): 1000 INJECTION, SOLUTION INTRAVENOUS; SUBCUTANEOUS at 06:38

## 2025-01-12 RX ADMIN — SODIUM CHLORIDE 1000 MILLILITER(S): 9 INJECTION, SOLUTION INTRAVENOUS at 01:39

## 2025-01-12 RX ADMIN — MIDODRINE HYDROCHLORIDE 10 MILLIGRAM(S): 5 TABLET ORAL at 21:57

## 2025-01-12 RX ADMIN — SODIUM CHLORIDE 75 MILLILITER(S): 9 INJECTION, SOLUTION INTRAVENOUS at 17:50

## 2025-01-12 RX ADMIN — HYDROCORTISONE 100 MILLIGRAM(S): 100 ENEMA RECTAL at 07:48

## 2025-01-12 RX ADMIN — MAGNESIUM SULFATE 100 GRAM(S): 500 INJECTION, SOLUTION INTRAMUSCULAR; INTRAVENOUS at 05:09

## 2025-01-12 RX ADMIN — LEVOTHYROXINE SODIUM 112.5 MICROGRAM(S): 175 TABLET ORAL at 00:02

## 2025-01-12 RX ADMIN — HYDROCORTISONE 100 MILLIGRAM(S): 100 ENEMA RECTAL at 00:02

## 2025-01-12 RX ADMIN — PIPERACILLIN AND TAZOBACTAM 25 GRAM(S): 3; .375 INJECTION, POWDER, LYOPHILIZED, FOR SOLUTION INTRAVENOUS at 04:48

## 2025-01-12 RX ADMIN — OSELTAMIVIR 75 MILLIGRAM(S): 75 CAPSULE ORAL at 17:42

## 2025-01-12 RX ADMIN — VANCOMYCIN HYDROCHLORIDE 250 MILLIGRAM(S): 5 INJECTION, POWDER, LYOPHILIZED, FOR SOLUTION INTRAVENOUS at 18:20

## 2025-01-12 RX ADMIN — HYDROCORTISONE 100 MILLIGRAM(S): 100 ENEMA RECTAL at 13:26

## 2025-01-12 RX ADMIN — PIPERACILLIN AND TAZOBACTAM 25 GRAM(S): 3; .375 INJECTION, POWDER, LYOPHILIZED, FOR SOLUTION INTRAVENOUS at 22:02

## 2025-01-12 RX ADMIN — HEPARIN SODIUM 5000 UNIT(S): 1000 INJECTION, SOLUTION INTRAVENOUS; SUBCUTANEOUS at 17:42

## 2025-01-12 RX ADMIN — LEVOTHYROXINE SODIUM 150 MICROGRAM(S): 175 TABLET ORAL at 04:46

## 2025-01-12 RX ADMIN — VANCOMYCIN HYDROCHLORIDE 250 MILLIGRAM(S): 5 INJECTION, POWDER, LYOPHILIZED, FOR SOLUTION INTRAVENOUS at 02:58

## 2025-01-12 RX ADMIN — MIDODRINE HYDROCHLORIDE 10 MILLIGRAM(S): 5 TABLET ORAL at 01:47

## 2025-01-12 RX ADMIN — PIPERACILLIN AND TAZOBACTAM 200 GRAM(S): 3; .375 INJECTION, POWDER, LYOPHILIZED, FOR SOLUTION INTRAVENOUS at 01:47

## 2025-01-12 RX ADMIN — MIDODRINE HYDROCHLORIDE 10 MILLIGRAM(S): 5 TABLET ORAL at 13:25

## 2025-01-12 RX ADMIN — HYDROCORTISONE 100 MILLIGRAM(S): 100 ENEMA RECTAL at 21:57

## 2025-01-12 RX ADMIN — PIPERACILLIN AND TAZOBACTAM 25 GRAM(S): 3; .375 INJECTION, POWDER, LYOPHILIZED, FOR SOLUTION INTRAVENOUS at 13:25

## 2025-01-12 RX ADMIN — SODIUM CHLORIDE 75 MILLILITER(S): 9 INJECTION, SOLUTION INTRAVENOUS at 05:04

## 2025-01-12 RX ADMIN — PANTOPRAZOLE 40 MILLIGRAM(S): 40 TABLET, DELAYED RELEASE ORAL at 06:37

## 2025-01-12 RX ADMIN — OSELTAMIVIR 75 MILLIGRAM(S): 75 CAPSULE ORAL at 06:37

## 2025-01-12 RX ADMIN — MIDODRINE HYDROCHLORIDE 10 MILLIGRAM(S): 5 TABLET ORAL at 06:37

## 2025-01-12 RX ADMIN — LIDOCAINE 1 APPLICATION(S): 50 OINTMENT TOPICAL at 22:36

## 2025-01-12 NOTE — CONSULT NOTE ADULT - ASSESSMENT
70 y/o M with Gastric cancer s/p chemo and immunotherapy, remote hx of papillary thyroid cancer, DM2 in remission, HTN who presents with AMS. He was found down by family at home. Normally AOx3. Upon presentation, found to be septic, hypoglycemic, and hypotensive. Septic due to Influenza A.    Endocrine consulted for: Hypoglycemia    #Hypoglycemia  #Hypotension  #Adrenal insufficiency  History obtained from daughter at bedside due to patient's confusion  Patient has hx of gastric cancer and is seen by oncologist Dr. Albert Mercedes.  He was previously on chemotherapy and Pembrolizumab (completed treatment 12/2023).  He has no prior known diagnosis of adrenal insufficiency.  He previously had T2DM but after significant weight loss with gastric cancer, DM in remission, has not been on any diabetes medications for about 1 year.    Patient had recurrent hypoglycemia since presentation despite several amps of D50 and D5W, which was then increased to D10.     Patient then developed hypotension. Found to be septic due to influenza A.    Given hypoglycemia and hypotension, empiric stress dose steroids administered: Hydrocortisone 100mg IV q8h since 1/12 00:02.    Cortisol level checked before steroids started was low to 0.9 (1/11 23:56). Very low in setting of stress, consistent with adrenal insufficiency. 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    Recommendations:  - Continue IV hydrocortisone 100mg IV q8h for now. Will taper as able daily based on clinical status.  - Once steroid tapered to physiologic dose, will aim to recheck HPA axis  - Continue treatment and management for sepsis per primary team  - Wean D5NS as tolerated  - Can check FS q6h  - For complete workup, follow up sulfonylurea panel.  - Ensure hypoglycemia protocol orders are in place  - if FSG <60 or if patient has symptoms of hypoglycemia, PRIOR TO TREATING please draw the following STAT LABS to better assess the cause of hypoglycemia, so we can determine the best treatment:  1) BMP  2) serum insulin  3) c-peptide  4) pro-insulin  5) Insulin antibodies  6) Beta hydroxybutyrate    #Postoperative hypothyroidism  #Hx of papillary thyroid cancer  He also has remote hx of papillary thyroid cancer diagnosed in 1990s. Follows with Dr. Sher outpatient.  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  TSH 1.05-3.18, Free T4 mildly low 0.8  - Recommend continue Levothyroxine 150mcg daily      Patient will need follow up with Dr. Sher on discharge      Discussed with primary team     Michael Vaz MD  Endocrine Fellow  Can be reached via teams. For follow up questions, discharge recommendations, or new consults, please call answering service at [phone] (weekdays); [phone] (nights/weekends).

## 2025-01-12 NOTE — CONSULT NOTE ADULT - ATTENDING COMMENTS
pt is a 64 yo male with hx htn, dm2, gastric ca, who presents  with weakness, dxed with FLU  A , noted to have hypoglycemia  with FS 58 , pt is on d10 1/2 ns at 150 cc/hr. alert and awake  in no acute distress. lungs clear, heart s1s2 abdomen nontender  ext no edema,     labs   wbc 5 hgb 13 hct 37 bicarb 21 cr 0.77      A/P  64 yo male with FLU A, finger stick decreased.  -panculture, blood, urine ,    -tamiflu for FLU A  -iv f D10 as tolerated fs q 4 hrs  -check echo to evaluate lv function  -dvt prophylaxis with sub q heparin  -monitor urine output  -endocrine consult   -pt does not require icu level care
68 y/o M with Gastric cancer s/p chemo and immunotherapy, remote hx of papillary thyroid cancer, DM2 in remission, HTN who presents with AMS. He was found down by family at home. Normally AOx3. Upon presentation, found to be septic, hypoglycemic, and hypotensive. Septic due to Influenza A.  Endocrine consulted for: Hypoglycemia.  Random cortisol was 0.9.  He was previously on chemotherapy and Pembrolizumab (completed treatment 12/2023).  BP is soft, continue IV hydrocortisone 100mg IV q8h for now. Will recommend tapering based on clinical status.  rest of the plan as fellow's note.

## 2025-01-12 NOTE — CHART NOTE - NSCHARTNOTEFT_GEN_A_CORE
Called by RN for coude friedman placement. Regular friedman placement failed x2 due to meeting resistance. 16fr Coude placed without resistance.     Luz Maria Petersen PA-C  Department of Internal Medicine  pager [phone], available on [employer] TEAMS

## 2025-01-12 NOTE — PROVIDER CONTACT NOTE (HYPOGLYCEMIA EVENT) - NS PROVIDER CONTACT RECOMMEND-HYPO
Q1 glucose checks and MICU consult?? 
Monitor POCT glucose Q1 until sugars stabilize, feed patient and place on D5NS IV drip.

## 2025-01-12 NOTE — CONSULT NOTE ADULT - ASSESSMENT
69M with reported hx of DM type 2, hypothyroidism, HTN, stomach cancer (in remission for 2 years) brought in by daughters presents to ED for altered mental status since this morning. Admitted to medicine for sepsis 2/2 influenza, being covered empirically for bacterial sepsis as well. Had episodes of hypoglycemia in ED, treated initially with pushes of D50, D5 gtt, now on D10 1/2 NS gtt. MICU consult for hypoglycemia. Family member at bedside reports med list given to admitting physician is up to date, and it does not contain insulin or other medication which cause hypoglycemia. Case reviewed by endocrine fellow, who recommends dextrose containing fluids for treatment of hypoglycemia. During our assessment, patient had borderline hypotension with MAP 63-66, BPs ranging 73-78/50s. No evidence of fluid overload on exam, no hx of heart failure, mentation unchanged per family member at bedside.    #Sepsis 2/2 influenza, cultures pending  #Hypoglycemia  #Borderline hypotension    Recommendations:  - q4 vital signs, q4 fingersticks, maintain hypoglycemia order set  - agree with endocrine recs regarding obtaining labwork prior to initiating hypoglycemia treatment  - agree with dextrose containing fluids as ordered  - would administer additional 1L LR fluid bolus now, consider midodrine 10mg q8 PRN for MAP < 65  - admit to medicine with telemetry    Case d/w Dr. Dotson. All recommendations preliminary until note finalized by attending. 69M with reported hx of DM type 2, hypothyroidism, HTN, stomach cancer (in remission for 2 years) brought in by daughters presents to ED for altered mental status since this morning. Admitted to medicine for sepsis 2/2 influenza, being covered empirically for bacterial sepsis as well. Had episodes of hypoglycemia in ED, treated initially with pushes of D50, D5 gtt, now on D10 1/2 NS gtt. MICU consult for hypoglycemia. Family member at bedside reports med list given to admitting physician is up to date, and it does not contain insulin or other medication which cause hypoglycemia. Case reviewed by endocrine fellow, who recommends dextrose containing fluids for treatment of hypoglycemia. During our assessment, patient had borderline hypotension with MAP 63-66, BPs ranging 73-78/50s. No evidence of fluid overload on exam, no hx of heart failure, mentation unchanged per family member at bedside.    #Sepsis 2/2 influenza, cultures pending  #Hypoglycemia  #Borderline hypotension    Recommendations:  - q4 vital signs, q4 fingersticks, maintain hypoglycemia order set  - agree with endocrine recs regarding obtaining labwork prior to initiating hypoglycemia treatment  - agree with dextrose containing fluids as ordered  - would administer additional 1L LR fluid bolus now, consider midodrine 10mg q8 PRN for MAP < 65  - admit to medicine with telemetry    Case d/w Dr. Dotson. Recs communicated to primary team via teams phone call.  All recommendations preliminary until note finalized by attending.

## 2025-01-12 NOTE — CHART NOTE - NSCHARTNOTEFT_GEN_A_CORE
Patient noted to have low blood pressure with 70/50s. Likely having worsening sepsis.    Patient reassessed at bedside, the patient appears to be more alert and more conversive, asking for food. Mental status shows improvement after synthroid and Solu-cortef was given.    However patient blood pressure remains low, patient denies symptoms at this time.    Will give 1L of LR bolus, and start midodrine 10mg Q8hr.    MICU consulted for low blood pressure and persistent hypoglycemia requiring Q1 fingerstick checks. Pending recs. Low threshold to start vasopressors if patient fails the LR bolus and midodrine. I also had called endocrine overnight for recommendations on work up of adrenal insufficiency.    Patient is critically ill at this time.    Upon my evaluation, this patient had a high probability of imminent or life-threatening deterioration due to concern for sepsis secondary to influenza which required my direct attention, intervention, and personal management.  The patient has a  medical condition that impairs one or more vital organ systems.  Frequent personal assessment and adjustment of medical interventions was performed.       I have personally provided 45 minutes of critical care time exclusive of time spent on separately billable procedures. Time includes review of laboratory data, radiology results, discussion with consultants, patient and family; monitoring for potential decompensation, as well as time spent retrieving data and reviewing the chart and documenting the visit. Interventions were performed as documented above. Patient noted to have low blood pressure with 70/50s. Likely having worsening sepsis.    Patient reassessed at bedside, the patient appears to be more alert and more conversive, asking for food. He is now able to tell me that he fell on the floor otherwise no new history compared to what the daughter provided. He was not able to do this when I saw him earlier in the night. Mental status shows improvement after synthroid and Solu-cortef was given.    However patient blood pressure remains low, patient denies symptoms at this time.    Will give 1L of LR bolus, and start midodrine 10mg Q8hr.    MICU consulted for low blood pressure and persistent hypoglycemia requiring Q1 fingerstick checks. Pending recs. Low threshold to start vasopressors if patient fails the LR bolus and midodrine. I also had called endocrine overnight for recommendations on work up of adrenal insufficiency.    Patient is critically ill at this time.    Upon my evaluation, this patient had a high probability of imminent or life-threatening deterioration due to concern for sepsis secondary to influenza which required my direct attention, intervention, and personal management.  The patient has a  medical condition that impairs one or more vital organ systems.  Frequent personal assessment and adjustment of medical interventions was performed.       I have personally provided 45 minutes of critical care time exclusive of time spent on separately billable procedures. Time includes review of laboratory data, radiology results, discussion with consultants, patient and family; monitoring for potential decompensation, as well as time spent retrieving data and reviewing the chart and documenting the visit. Interventions were performed as documented above. Patient noted to have low blood pressure with 70/50s. Likely having worsening sepsis.    Patient reassessed at bedside, the patient appears to be more alert and more conversive, asking for food. He is now able to tell me that he fell on the floor otherwise no new history compared to what the daughter provided. He was not able to do this when I saw him earlier in the night. Mental status shows improvement after synthroid and Solu-cortef was given.    However patient blood pressure remains low, patient denies symptoms at this time.    Will give 1L of LR bolus, and start midodrine 10mg Q8hr. I also started solu-cortef 100mg Q8hr for blood pressure support.    MICU consulted for low blood pressure and persistent hypoglycemia requiring Q1 fingerstick checks. Pending recs. Low threshold to start vasopressors if patient fails the LR bolus and midodrine. I also had called endocrine overnight for recommendations on work up of adrenal insufficiency.    Patient is critically ill at this time.    Upon my evaluation, this patient had a high probability of imminent or life-threatening deterioration due to concern for sepsis secondary to influenza which required my direct attention, intervention, and personal management.  The patient has a  medical condition that impairs one or more vital organ systems.  Frequent personal assessment and adjustment of medical interventions was performed.       I have personally provided 45 minutes of critical care time exclusive of time spent on separately billable procedures. Time includes review of laboratory data, radiology results, discussion with consultants, patient and family; monitoring for potential decompensation, as well as time spent retrieving data and reviewing the chart and documenting the visit. Interventions were performed as documented above.

## 2025-01-12 NOTE — PATIENT PROFILE ADULT - FALL HARM RISK - HARM RISK INTERVENTIONS
Assistance with ambulation/Assistance OOB with selected safe patient handling equipment/Communicate Risk of Fall with Harm to all staff/Discuss with provider need for PT consult/Monitor gait and stability/Reinforce activity limits and safety measures with patient and family/Tailored Fall Risk Interventions/Visual Cue: Yellow wristband and red socks/Bed in lowest position, wheels locked, appropriate side rails in place/Call bell, personal items and telephone in reach/Instruct patient to call for assistance before getting out of bed or chair/Non-slip footwear when patient is out of bed/[place] to call system/Physically safe environment - no spills, clutter or unnecessary equipment/Purposeful Proactive Rounding/Room/bathroom lighting operational, light cord in reach

## 2025-01-12 NOTE — PROVIDER CONTACT NOTE (HYPOGLYCEMIA EVENT) - NS PROVIDER CONTACT BACKGROUND-HYPO
Age: 69y    Gender: Male    POCT Blood Glucose:  113 mg/dL (01-12-25 @ 00:43)  115 mg/dL (01-11-25 @ 23:46)  58 mg/dL (01-11-25 @ 22:55)  116 mg/dL (01-11-25 @ 21:44)  75 mg/dL (01-11-25 @ 21:14)  103 mg/dL (01-11-25 @ 20:35)  59 mg/dL (01-11-25 @ 20:04)  38 mg/dL (01-11-25 @ 19:18)      eMAR:  dextrose 50% Injectable   25 Gram(s) IV Push (01-11-25 @ 21:30)    dextrose 50% Injectable   25 Gram(s) IV Push (01-11-25 @ 20:16)  MD Huggins at bedside, 25 G D50 given as ordered    dextrose 50% Injectable   50 milliLiter(s) IV Push (01-11-25 @ 19:29)    dextrose 50% Injectable   12.5 Gram(s) IV Push (01-11-25 @ 23:14)      hydrocortisone sodium succinate Injectable   100 milliGRAM(s) IV Push (01-12-25 @ 00:02)    levothyroxine Injectable   112.5 MICROGram(s) IV Push (01-12-25 @ 00:02)    
Age: 69y    Gender: Male    POCT Blood Glucose:  71 mg/dL (01-11-25 @ 20:07) This POCT not transferring to sunrise  59 mg/dL (01-11-25 @ 20:04)  38 mg/dL (01-11-25 @ 19:18)  120mg/dL (01-11-25 @ 1926) This POCT not transferring to sunrise   65 mg/dL (01-11-25 @ 09:47)  65 mg/dL (01-11-25 @ 09:45)      eMAR:  dextrose 50% Injectable   25 Gram(s) IV Push (01-11-25 @ 20:16)    dextrose 50% Injectable   50 milliLiter(s) IV Push (01-11-25 @ 19:29)

## 2025-01-12 NOTE — PROVIDER CONTACT NOTE (HYPOGLYCEMIA EVENT) - NS PROVIDER CONTACT ASSESS-HYPO
Pt Aox2, lethargic in bed. Provider at bedside again to evaluate patient for recurring hypoglycemia events. STAT labs sent for endocrinology due to possible adrenal insufficieny. Pt received IV dextrose and is now on D10 IV maintenance fluids

## 2025-01-12 NOTE — PHARMACOTHERAPY INTERVENTION NOTE - COMMENTS
Medication history initially marked as completed.  Per Note: to confirm no DM medications were prescribed - Surescripts does not show any DM medications being filled however unable to confirm as pharmacy currently closed ([facility]).  Will Follow up Monday and make any necessary changes accordingly.

## 2025-01-12 NOTE — CONSULT NOTE ADULT - SUBJECTIVE AND OBJECTIVE BOX
NOTE INCOMPLETE/ IN PROGRESS  *Please wait for attending attestation for official recommendations.     HPI:  This is a 68 y/o M with pmhx of DM2, HTN, Stomach cancer, Thyroid cancer, presented to the ED for new onset altered mental status. The patient was found on the ground this morning next to his bed. Family at bedside, stated that he slipped on the bed and landed on the ground. Also has recently poor PO intake in the last few days. Denies head trauma. Denies fevers at home at this time. Denies cough or shortness of breath as per family member. The patient was normal and conversive the day prior to the event. The patient at bedside was lethargic appearing and can only answer yes/no questions.    The patient has a hx of total thyroidectomy, and did not take his synthroid medication in the AM. He also is not taking any DM medications or insulin at home. Patient also urinated into bottle at bedside (11 Jan 2025 23:52)      Consulted for:    PAST MEDICAL & SURGICAL HISTORY:  Hypertension      Diabetes mellitus  x15 years ; pt denies fs      GERD (gastroesophageal reflux disease)      Cancer of thyroid  tx surgically      History of BPH      Helicobacter pylori infection  Rx ; to be completed 10/05/21      S/P thyroidectomy  1998      H/O inguinal hernia repair  Left 1962          FAMILY HISTORY:  No pertinent family history in first degree relatives        Social History:    Outpatient Medications:    MEDICATIONS  (STANDING):  dextrose 5% + sodium chloride 0.45%. 1000 milliLiter(s) (75 mL/Hr) IV Continuous <Continuous>  dextrose 5%. 1000 milliLiter(s) (100 mL/Hr) IV Continuous <Continuous>  dextrose 5%. 1000 milliLiter(s) (50 mL/Hr) IV Continuous <Continuous>  dextrose 50% Injectable 25 Gram(s) IV Push once  dextrose 50% Injectable 12.5 Gram(s) IV Push once  dextrose 50% Injectable 25 Gram(s) IV Push once  glucagon  Injectable 1 milliGRAM(s) IntraMuscular once  heparin   Injectable 5000 Unit(s) SubCutaneous every 12 hours  hydrocortisone sodium succinate Injectable 100 milliGRAM(s) IV Push every 8 hours  levothyroxine 150 MICROGram(s) Oral daily  midodrine 10 milliGRAM(s) Oral every 8 hours  oseltamivir 75 milliGRAM(s) Oral two times a day  pantoprazole    Tablet 40 milliGRAM(s) Oral before breakfast  piperacillin/tazobactam IVPB.. 3.375 Gram(s) IV Intermittent every 8 hours  vancomycin  IVPB 750 milliGRAM(s) IV Intermittent every 12 hours    MEDICATIONS  (PRN):  dextrose Oral Gel 15 Gram(s) Oral once PRN Blood Glucose LESS THAN 70 milliGRAM(s)/deciliter      Allergies    No Known Allergies    Intolerances      Review of Systems:  Constitutional: No fever  Eyes: No blurry vision  Neuro: No tremors  HEENT: No pain  Cardiovascular: No chest pain, palpitations  Respiratory: No SOB, no cough  GI: No nausea, vomiting, abdominal pain  : No dysuria  Skin: no rash  Psych: no depression  Endocrine: no polyuria, polydipsia  Hem/lymph: no swelling  Osteoporosis: no fractures    ALL OTHER SYSTEMS REVIEWED AND NEGATIVE    UNABLE TO OBTAIN    PHYSICAL EXAM:  VITALS: T(C): 36.7 (01-12-25 @ 04:00)  T(F): 98.1 (01-12-25 @ 04:00), Max: 103.2 (01-11-25 @ 10:51)  HR: 73 (01-12-25 @ 04:00) (57 - 95)  BP: 91/71 (01-12-25 @ 04:00) (76/52 - 125/92)  RR:  (17 - 25)  SpO2:  (95% - 100%)  Wt(kg): --  GENERAL: NAD, well-groomed, well-developed  EYES: No proptosis, no lid lag, anicteric  HEENT:  Atraumatic, Normocephalic, moist mucous membranes  THYROID: Normal size, no palpable nodules  RESPIRATORY: Clear to auscultation bilaterally; No rales, rhonchi, wheezing  CARDIOVASCULAR: Regular rate and rhythm; No murmurs; no peripheral edema  GI: Soft, nontender, non distended, normal bowel sounds  SKIN: Dry, intact, No rashes or lesions  MUSCULOSKELETAL: Full range of motion, normal strength  NEURO: sensation intact, extraocular movements intact, no tremor  PSYCH: Alert and oriented x 3, normal affect, normal mood  CUSHING'S SIGNS: no striae      CAPILLARY BLOOD GLUCOSE  71 (11 Jan 2025 20:07)  120 (11 Jan 2025 19:26)      POCT Blood Glucose.: 161 mg/dL (12 Jan 2025 04:34)  POCT Blood Glucose.: 111 mg/dL (12 Jan 2025 01:58)  POCT Blood Glucose.: 113 mg/dL (12 Jan 2025 00:43)  POCT Blood Glucose.: 115 mg/dL (11 Jan 2025 23:46)  POCT Blood Glucose.: 58 mg/dL (11 Jan 2025 22:55)  POCT Blood Glucose.: 116 mg/dL (11 Jan 2025 21:44)  POCT Blood Glucose.: 75 mg/dL (11 Jan 2025 21:14)  POCT Blood Glucose.: 103 mg/dL (11 Jan 2025 20:35)  POCT Blood Glucose.: 59 mg/dL (11 Jan 2025 20:04)  POCT Blood Glucose.: 38 mg/dL (11 Jan 2025 19:18)                            13.0   5.62  )-----------( 186      ( 11 Jan 2025 10:33 )             38.9       01-11    137  |  105  |  15  ----------------------------<  159[H]  3.9   |  21[L]  |  0.77    eGFR: 97    Ca    7.8[L]      01-11  Mg     1.40     01-11  Phos  3.7     01-11    TPro  7.2  /  Alb  4.4  /  TBili  0.7  /  DBili  x   /  AST  30  /  ALT  11  /  AlkPhos  63  01-11      Thyroid Function Tests:  01-11 @ 23:56 TSH -- FreeT4 0.8 T3 -- Anti TPO -- Anti Thyroglobulin Ab -- TSI --  01-11 @ 23:43 TSH 1.05 FreeT4 -- T3 -- Anti TPO -- Anti Thyroglobulin Ab -- TSI --      A1C with Estimated Average Glucose Result: 4.9 % (01-11-25 @ 23:56)          Radiology:              NOTE INCOMPLETE/ IN PROGRESS  *Please wait for attending attestation for official recommendations.     HPI:  68 y/o M with Gastric cancer s/p chemo and immunotherapy, remote hx of papillary thyroid cancer, DM2 in remission, HTN who presents with AMS. He was found down by family at home. Normally AOx3. Upon presentation, found to be septic, hypoglycemic, and hypotensive. +Influenza A.    Consulted for: Hypoglycemia    Endocrine history:  History obtained from daughter at bedside due to patient's confusion    Patient has hx of gastric cancer and is seen by oncologist Dr. Albert Mercedes.  He was previously on chemotherapy and Pembrolizumab (completed treatment 12/2023).  He has no prior known diagnosis of adrenal insufficiency.  He previously had T2DM but after significant weight loss with gastric cancer, DM in remission, has not been on any diabetes medications for about 1 year.    He also has remote hx of papillary thyroid cancer diagnosed in 1990s. Follows with Dr. Sher outpatient.  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        PAST MEDICAL & SURGICAL HISTORY:  Hypertension      Diabetes mellitus  x15 years ; pt denies fs      GERD (gastroesophageal reflux disease)      Cancer of thyroid  tx surgically      History of BPH      Helicobacter pylori infection  Rx ; to be completed 10/05/21      S/P thyroidectomy  1998      H/O inguinal hernia repair  Left 1962          FAMILY HISTORY:  Mother had DM  Father had liver cancer      Social History:  Quit smoking 2 years ago  No alcohol    Outpatient Medications:  Home Medications:  lisinopril 2.5 mg oral tablet: 1 tab(s) orally once a day (11 Jan 2025 22:00)  mesalamine 1.2 g oral delayed release tablet: 3 tab(s) orally once a day (12 Jan 2025 01:46)  pantoprazole 40 mg oral delayed release tablet: 1 tab(s) orally once a day (11 Jan 2025 22:01)  Synthroid 150 mcg (0.15 mg) oral tablet: 1 tab(s) orally once a day AM (11 Jan 2025 22:01)      MEDICATIONS  (STANDING):  dextrose 5% + sodium chloride 0.45%. 1000 milliLiter(s) (75 mL/Hr) IV Continuous <Continuous>  dextrose 5%. 1000 milliLiter(s) (100 mL/Hr) IV Continuous <Continuous>  dextrose 5%. 1000 milliLiter(s) (50 mL/Hr) IV Continuous <Continuous>  dextrose 50% Injectable 25 Gram(s) IV Push once  dextrose 50% Injectable 12.5 Gram(s) IV Push once  dextrose 50% Injectable 25 Gram(s) IV Push once  glucagon  Injectable 1 milliGRAM(s) IntraMuscular once  heparin   Injectable 5000 Unit(s) SubCutaneous every 12 hours  hydrocortisone sodium succinate Injectable 100 milliGRAM(s) IV Push every 8 hours  levothyroxine 150 MICROGram(s) Oral daily  midodrine 10 milliGRAM(s) Oral every 8 hours  oseltamivir 75 milliGRAM(s) Oral two times a day  pantoprazole    Tablet 40 milliGRAM(s) Oral before breakfast  piperacillin/tazobactam IVPB.. 3.375 Gram(s) IV Intermittent every 8 hours  vancomycin  IVPB 750 milliGRAM(s) IV Intermittent every 12 hours    MEDICATIONS  (PRN):  dextrose Oral Gel 15 Gram(s) Oral once PRN Blood Glucose LESS THAN 70 milliGRAM(s)/deciliter      Allergies    No Known Allergies    Intolerances      Review of Systems:  Constitutional: +weakness, AMS, fever  Eyes: No blurry vision  Neuro: No tremors  HEENT: No pain  Cardiovascular: No chest pain, palpitations  Respiratory: +cough. No SOB  GI: No nausea, vomiting, abdominal pain  : No dysuria  Skin: no rash  Psych: no depression  Endocrine: no polyuria, polydipsia  Hem/lymph: no swelling  Osteoporosis: no fractures      PHYSICAL EXAM:  VITALS: T(C): 36.7 (01-12-25 @ 04:00)  T(F): 98.1 (01-12-25 @ 04:00), Max: 103.2 (01-11-25 @ 10:51)  HR: 73 (01-12-25 @ 04:00) (57 - 95)  BP: 91/71 (01-12-25 @ 04:00) (76/52 - 125/92)  RR:  (17 - 25)  SpO2:  (95% - 100%)  Wt(kg): --  GENERAL: elderly male, mild distress, confused  EYES: No proptosis, no lid lag, anicteric  HEENT:  Atraumatic, Normocephalic, moist mucous membranes  RESPIRATORY: coughing. Intermittent increased respiratory effort. On NC.  CARDIOVASCULAR: no peripheral edema  GI: Soft, nontender, non distended  SKIN: Dry, intact, No rashes or lesions  MUSCULOSKELETAL: [place]  NEURO: sensation intact, extraocular movements intact, no tremor  PSYCH: Alert, responds to some questions, confused  CUSHING'S SIGNS: no striae      CAPILLARY BLOOD GLUCOSE  71 (11 Jan 2025 20:07)  120 (11 Jan 2025 19:26)      POCT Blood Glucose.: 161 mg/dL (12 Jan 2025 04:34)  POCT Blood Glucose.: 111 mg/dL (12 Jan 2025 01:58)  POCT Blood Glucose.: 113 mg/dL (12 Jan 2025 00:43)  POCT Blood Glucose.: 115 mg/dL (11 Jan 2025 23:46)  POCT Blood Glucose.: 58 mg/dL (11 Jan 2025 22:55)  POCT Blood Glucose.: 116 mg/dL (11 Jan 2025 21:44)  POCT Blood Glucose.: 75 mg/dL (11 Jan 2025 21:14)  POCT Blood Glucose.: 103 mg/dL (11 Jan 2025 20:35)  POCT Blood Glucose.: 59 mg/dL (11 Jan 2025 20:04)  POCT Blood Glucose.: 38 mg/dL (11 Jan 2025 19:18)                            13.0   5.62  )-----------( 186      ( 11 Jan 2025 10:33 )             38.9       01-11    137  |  105  |  15  ----------------------------<  159[H]  3.9   |  21[L]  |  0.77    eGFR: 97    Ca    7.8[L]      01-11  Mg     1.40     01-11  Phos  3.7     01-11    TPro  7.2  /  Alb  4.4  /  TBili  0.7  /  DBili  x   /  AST  30  /  ALT  11  /  AlkPhos  63  01-11      Thyroid Function Tests:  01-11 @ 23:56 TSH -- FreeT4 0.8 T3 -- Anti TPO -- Anti Thyroglobulin Ab -- TSI --  01-11 @ 23:43 TSH 1.05 FreeT4 -- T3 -- Anti TPO -- Anti Thyroglobulin Ab -- TSI --      A1C with Estimated Average Glucose Result: 4.9 % (01-11-25 @ 23:56)          Radiology:

## 2025-01-12 NOTE — PATIENT PROFILE ADULT - NSPROPOAURINARYCATHETER_GEN_A_NUR
[Home] : at home, [unfilled] , at the time of the visit. [Medical Office: ([facility])___] : at the medical office located in  [Verbal consent obtained from patient] : the patient, [unfilled] no [FreeTextEntry1] : Patient agreed for rehabilitation followup visit, she reports since last visit she has had only one ocular migraine, she reports the blepharospasm is much less in intensity, she does however report that she has intermittent twitching in the eyelids right more than left, does not interfere with her functioning.\par \par Patient had MRI of the brain, it showed no evidence of acute stroke, mass or hydrocephalus.\par \par Labs done revealed ESR 17, CRP 0.74

## 2025-01-12 NOTE — CONSULT NOTE ADULT - SUBJECTIVE AND OBJECTIVE BOX
Patient:  KARINA ALDRICH  [id number]    HPI:  69M with reported hx of DM type 2, hypothyroidism, HTN, stomach cancer (in remission for 2 years) brought in by daughters presents to ED for altered mental status since this morning. Admitted to medicine for sepsis 2/2 influenza, being covered empirically for bacterial sepsis as well. Had episodes of hypoglycemia in ED, treated initially with pushes of D50, D5 gtt, now on D10 1/2 NS gtt. MICU consult for hypoglycemia. Family member at bedside reports med list given to admitting physician is up to date, and it does not contain insulin or other medication which cause hypoglycemia. Case reviewed by endocrine fellow, who recommends dextrose containing fluids for treatment of hypoglycemia.      PAST MEDICAL & SURGICAL HISTORY:  Hypertension      Diabetes mellitus  x15 years ; pt denies fs      GERD (gastroesophageal reflux disease)      Cancer of thyroid  tx surgically      History of BPH      Helicobacter pylori infection  Rx ; to be completed 10/05/21      S/P thyroidectomy  1998      H/O inguinal hernia repair  Left 1962          FAMILY HISTORY:      SOCIAL HISTORY:    Allergies    No Known Allergies    Intolerances        HOME MEDICATIONS:    REVIEW OF SYSTEMS:  [ ] Unable to assess ROS because ______  [ X] Negative except as stated in HPI      OBJECTIVE:  T(F): 102.1 (01-12-25 @ 00:09), Max: 103.2 (01-11-25 @ 10:51)  HR: 91 (01-11-25 @ 23:54) (73 - 104)  BP: 92/73 (01-11-25 @ 23:54) (80/61 - 125/92)  BP(mean): --  ABP: --  ABP(mean): --  RR: 23 (01-11-25 @ 23:54) (18 - 26)  SpO2: 95% (01-11-25 @ 23:54) (92% - 100%)  CVP(mm Hg): --    I/O Summary 24H    CAPILLARY BLOOD GLUCOSE  71 (11 Jan 2025 20:07)      POCT Blood Glucose.: 113 mg/dL (12 Jan 2025 00:43)      PHYSICAL EXAM:  GENERAL: appears uncomfortable, alert, oriented by 2, follows instructions  EYES: EOMI, PERRL  NECK: Supple, No JVD  CHEST/LUNG: Clear to auscultation bilaterally; No rales, rhonchi, wheezing, or rubs. Unlabored respirations  HEART: Regular rate and rhythm; No murmurs, rubs, or gallops  ABDOMEN: Soft, Nontender, Nondistended.  EXTREMITIES: brisk capillary refill. No clubbing, cyanosis, or edema  NERVOUS SYSTEM:  no focal deficits  SKIN: No rashes or lesions    HOSPITAL MEDICATIONS:  MEDICATIONS  (STANDING):  azithromycin  IVPB 500 milliGRAM(s) IV Intermittent every 24 hours  azithromycin  IVPB      cefTRIAXone   IVPB 1000 milliGRAM(s) IV Intermittent every 24 hours  dextrose 10% + sodium chloride 0.45%. 1000 milliLiter(s) (150 mL/Hr) IV Continuous <Continuous>  dextrose 5%. 1000 milliLiter(s) (100 mL/Hr) IV Continuous <Continuous>  dextrose 5%. 1000 milliLiter(s) (50 mL/Hr) IV Continuous <Continuous>  dextrose 50% Injectable 25 Gram(s) IV Push once  dextrose 50% Injectable 12.5 Gram(s) IV Push once  dextrose 50% Injectable 25 Gram(s) IV Push once  glucagon  Injectable 1 milliGRAM(s) IntraMuscular once  levothyroxine 150 MICROGram(s) Oral daily  magnesium sulfate  IVPB 1 Gram(s) IV Intermittent once  oseltamivir 75 milliGRAM(s) Oral two times a day  pantoprazole    Tablet 40 milliGRAM(s) Oral before breakfast    MEDICATIONS  (PRN):  dextrose Oral Gel 15 Gram(s) Oral once PRN Blood Glucose LESS THAN 70 milliGRAM(s)/deciliter      LABS:  CBC 01-11-25 @ 10:33                        13.0   5.62  )-----------( 186                   38.9     Hgb trend: 13.0 <--   WBC trend: 5.62 <--     CMP 01-11-25 @ 23:43    137  |  105  |  15  ----------------------------<  159[H]  3.9   |  21[L]  |  0.77    Ca    7.8[L]      01-11-25 @ 23:43  Phos  3.7     01-11  Mg     1.40     01-11    TPro  7.2  /  Alb  4.4  /  TBili  0.7  /  DBili  x   /  AST  30  /  ALT  11  /  AlkPhos  63     01-11    Serum Cr (eGFR) trend: 0.77 (97) <-- , 0.76 (97) <--     PT/INR - ( 11 Jan 2025 10:33 )   PT: 13.9 sec;   INR: 1.20 ratio    PTT - ( 11 Jan 2025 10:33 ):38.1 sec    ABG Trend:     VBG Trend:   01-11-25 @ 23:43 - pH: 7.28  | pCO2: 53    | pO2: 41    | HCO3: 25    | Lactate: 1.3    01-11-25 @ 10:33 - pH: 7.33  | pCO2: 48    | pO2: 33    | HCO3: 25    | Lactate: 1.8        MICROBIOLOGY:     Urinalysis with Rflx Culture (collected 01-11-25 @ 15:00)        RADIOLOGY:  [X] Reviewed and interpreted by me - no evidence of pulmonary edema in CXR or bases of lungs on CT abdomen pelvis    EKG

## 2025-01-12 NOTE — PROGRESS NOTE ADULT - SUBJECTIVE AND OBJECTIVE BOX
Joe Osborn MD  Division of Hospitalist Medicine  Pager [phone]  Available on Teams    CC: Patient is a 69y old  Male who presents with a chief complaint of AMS (12 Jan 2025 10:05)        SUBJECTIVE / INTERVAL HPI: Patient seen and examined at bedside. Pt grossly denies any acute complaints.     ROS: negative unless otherwise stated above.    VITAL SIGNS:  Vital Signs Last 24 Hrs  T(C): 36.4 (12 Jan 2025 12:00), Max: 39.4 (11 Jan 2025 22:03)  T(F): 97.6 (12 Jan 2025 12:00), Max: 103 (11 Jan 2025 22:03)  HR: 77 (12 Jan 2025 12:00) (57 - 92)  BP: 93/64 (12 Jan 2025 12:00) (76/52 - 125/92)  BP(mean): --  RR: 18 (12 Jan 2025 12:00) (17 - 25)  SpO2: 98% (12 Jan 2025 12:00) (95% - 100%)    Parameters below as of 12 Jan 2025 12:00  Patient On (Oxygen Delivery Method): nasal cannula  O2 Flow (L/min): 3        01-12-25 @ 07:01  -  01-12-25 @ 15:29  --------------------------------------------------------  IN: 0 mL / OUT: 200 mL / NET: -200 mL        PHYSICAL EXAM:    General: NAD  HEENT: MMM  Neck: supple  Cardiovascular: +S1/S2; RRR  Respiratory: CTA B/L; no W/R/R  Gastrointestinal: soft, NT/ND  Extremities: WWP; no edema, clubbing or cyanosis  Neurological: awake and alert; communicating and able to follow commands without appreciated focal neurologic deficits    MEDICATIONS:  MEDICATIONS  (STANDING):  dextrose 5% + sodium chloride 0.45%. 1000 milliLiter(s) (75 mL/Hr) IV Continuous <Continuous>  dextrose 5%. 1000 milliLiter(s) (100 mL/Hr) IV Continuous <Continuous>  dextrose 5%. 1000 milliLiter(s) (50 mL/Hr) IV Continuous <Continuous>  dextrose 50% Injectable 25 Gram(s) IV Push once  dextrose 50% Injectable 12.5 Gram(s) IV Push once  dextrose 50% Injectable 25 Gram(s) IV Push once  glucagon  Injectable 1 milliGRAM(s) IntraMuscular once  heparin   Injectable 5000 Unit(s) SubCutaneous every 12 hours  hydrocortisone sodium succinate Injectable 100 milliGRAM(s) IV Push every 8 hours  levothyroxine 150 MICROGram(s) Oral daily  midodrine 10 milliGRAM(s) Oral every 8 hours  oseltamivir 75 milliGRAM(s) Oral two times a day  pantoprazole    Tablet 40 milliGRAM(s) Oral before breakfast  piperacillin/tazobactam IVPB.. 3.375 Gram(s) IV Intermittent every 8 hours  vancomycin  IVPB 750 milliGRAM(s) IV Intermittent every 12 hours    MEDICATIONS  (PRN):  dextrose Oral Gel 15 Gram(s) Oral once PRN Blood Glucose LESS THAN 70 milliGRAM(s)/deciliter      ALLERGIES:  Allergies    No Known Allergies    Intolerances        LABS:                        11.0   2.38  )-----------( 138      ( 12 Jan 2025 11:27 )             32.8     01-12    137  |  103  |  16  ----------------------------<  187[H]  4.9   |  23  |  0.77    Ca    8.3[L]      12 Jan 2025 11:27  Phos  4.3     01-12  Mg     1.90     01-12    TPro  7.2  /  Alb  4.4  /  TBili  0.7  /  DBili  x   /  AST  30  /  ALT  11  /  AlkPhos  63  01-11    PT/INR - ( 11 Jan 2025 10:33 )   PT: 13.9 sec;   INR: 1.20 ratio         PTT - ( 11 Jan 2025 10:33 )  PTT:38.1 sec  Urinalysis Basic - ( 12 Jan 2025 11:27 )    Color: x / Appearance: x / SG: x / pH: x  Gluc: 187 mg/dL / Ketone: x  / Bili: x / Urobili: x   Blood: x / Protein: x / Nitrite: x   Leuk Esterase: x / RBC: x / WBC x   Sq Epi: x / Non Sq Epi: x / Bacteria: x      CAPILLARY BLOOD GLUCOSE  71 (11 Jan 2025 20:07)      POCT Blood Glucose.: 161 mg/dL (12 Jan 2025 04:34)      RADIOLOGY & ADDITIONAL TESTS: Reviewed.

## 2025-01-12 NOTE — PROGRESS NOTE ADULT - TIME BILLING
Time-based billing (NON-critical care).     More than 50% of the visit was spent counseling and / or coordinating care by the attending physician.      The necessity of the time spent during the encounter on this date of service was due to: documentation in [place], reviewing chart and coordinating care with patient/ACPs and interdisciplinary staff (such as , social workers, etc) as well as reviewing vitals, laboratory data, radiology, medication list, consultants' recommendations and prior records. Interventions were performed as documented above.

## 2025-01-13 ENCOUNTER — RESULT REVIEW (OUTPATIENT)
Age: 70
End: 2025-01-13

## 2025-01-13 DIAGNOSIS — G92.8 OTHER TOXIC ENCEPHALOPATHY: ICD-10-CM

## 2025-01-13 DIAGNOSIS — R33.9 RETENTION OF URINE, UNSPECIFIED: ICD-10-CM

## 2025-01-13 LAB
ANION GAP SERPL CALC-SCNC: 12 MMOL/L — SIGNIFICANT CHANGE UP (ref 7–14)
BUN SERPL-MCNC: 15 MG/DL — SIGNIFICANT CHANGE UP (ref 7–23)
CALCIUM SERPL-MCNC: 8 MG/DL — LOW (ref 8.4–10.5)
CHLORIDE SERPL-SCNC: 105 MMOL/L — SIGNIFICANT CHANGE UP (ref 98–107)
CO2 SERPL-SCNC: 21 MMOL/L — LOW (ref 22–31)
CREAT SERPL-MCNC: 0.76 MG/DL — SIGNIFICANT CHANGE UP (ref 0.5–1.3)
EGFR: 97 ML/MIN/1.73M2 — SIGNIFICANT CHANGE UP
GLUCOSE BLDC GLUCOMTR-MCNC: 117 MG/DL — HIGH (ref 70–99)
GLUCOSE BLDC GLUCOMTR-MCNC: 176 MG/DL — HIGH (ref 70–99)
GLUCOSE BLDC GLUCOMTR-MCNC: 212 MG/DL — HIGH (ref 70–99)
GLUCOSE BLDC GLUCOMTR-MCNC: 86 MG/DL — SIGNIFICANT CHANGE UP (ref 70–99)
GLUCOSE SERPL-MCNC: 172 MG/DL — HIGH (ref 70–99)
HCT VFR BLD CALC: 31 % — LOW (ref 39–50)
HGB BLD-MCNC: 10.4 G/DL — LOW (ref 13–17)
MAGNESIUM SERPL-MCNC: 1.9 MG/DL — SIGNIFICANT CHANGE UP (ref 1.6–2.6)
MCHC RBC-ENTMCNC: 28 PG — SIGNIFICANT CHANGE UP (ref 27–34)
MCHC RBC-ENTMCNC: 33.5 G/DL — SIGNIFICANT CHANGE UP (ref 32–36)
MCV RBC AUTO: 83.3 FL — SIGNIFICANT CHANGE UP (ref 80–100)
MRSA PCR RESULT.: SIGNIFICANT CHANGE UP
NRBC # BLD: 0 /100 WBCS — SIGNIFICANT CHANGE UP (ref 0–0)
NRBC # FLD: 0 K/UL — SIGNIFICANT CHANGE UP (ref 0–0)
PHOSPHATE SERPL-MCNC: 3.3 MG/DL — SIGNIFICANT CHANGE UP (ref 2.5–4.5)
PLATELET # BLD AUTO: 123 K/UL — LOW (ref 150–400)
POTASSIUM SERPL-MCNC: 4.3 MMOL/L — SIGNIFICANT CHANGE UP (ref 3.5–5.3)
POTASSIUM SERPL-SCNC: 4.3 MMOL/L — SIGNIFICANT CHANGE UP (ref 3.5–5.3)
RBC # BLD: 3.72 M/UL — LOW (ref 4.2–5.8)
RBC # FLD: 13.7 % — SIGNIFICANT CHANGE UP (ref 10.3–14.5)
S AUREUS DNA NOSE QL NAA+PROBE: SIGNIFICANT CHANGE UP
SODIUM SERPL-SCNC: 138 MMOL/L — SIGNIFICANT CHANGE UP (ref 135–145)
WBC # BLD: 5.53 K/UL — SIGNIFICANT CHANGE UP (ref 3.8–10.5)
WBC # FLD AUTO: 5.53 K/UL — SIGNIFICANT CHANGE UP (ref 3.8–10.5)

## 2025-01-13 PROCEDURE — 93306 TTE W/DOPPLER COMPLETE: CPT | Mod: 26

## 2025-01-13 PROCEDURE — 99233 SBSQ HOSP IP/OBS HIGH 50: CPT

## 2025-01-13 PROCEDURE — 93356 MYOCRD STRAIN IMG SPCKL TRCK: CPT

## 2025-01-13 PROCEDURE — 71045 X-RAY EXAM CHEST 1 VIEW: CPT | Mod: 26

## 2025-01-13 PROCEDURE — 76376 3D RENDER W/INTRP POSTPROCES: CPT | Mod: 26

## 2025-01-13 RX ORDER — CHLORHEXIDINE GLUCONATE 1.2 MG/ML
1 RINSE ORAL
Refills: 0 | Status: DISCONTINUED | OUTPATIENT
Start: 2025-01-13 | End: 2025-01-15

## 2025-01-13 RX ORDER — TAMSULOSIN HYDROCHLORIDE 0.4 MG/1
0.4 CAPSULE ORAL AT BEDTIME
Refills: 0 | Status: DISCONTINUED | OUTPATIENT
Start: 2025-01-13 | End: 2025-01-14

## 2025-01-13 RX ORDER — OLANZAPINE 15 MG/1
1.25 TABLET ORAL ONCE
Refills: 0 | Status: COMPLETED | OUTPATIENT
Start: 2025-01-13 | End: 2025-01-13

## 2025-01-13 RX ORDER — HYDROCORTISONE 100 MG/60ML
50 ENEMA RECTAL EVERY 8 HOURS
Refills: 0 | Status: DISCONTINUED | OUTPATIENT
Start: 2025-01-13 | End: 2025-01-14

## 2025-01-13 RX ORDER — HYDROCORTISONE 100 MG/60ML
50 ENEMA RECTAL EVERY 8 HOURS
Refills: 0 | Status: DISCONTINUED | OUTPATIENT
Start: 2025-01-13 | End: 2025-01-13

## 2025-01-13 RX ADMIN — OSELTAMIVIR 75 MILLIGRAM(S): 75 CAPSULE ORAL at 10:10

## 2025-01-13 RX ADMIN — PIPERACILLIN AND TAZOBACTAM 25 GRAM(S): 3; .375 INJECTION, POWDER, LYOPHILIZED, FOR SOLUTION INTRAVENOUS at 23:16

## 2025-01-13 RX ADMIN — HYDROCORTISONE 50 MILLIGRAM(S): 100 ENEMA RECTAL at 23:18

## 2025-01-13 RX ADMIN — PIPERACILLIN AND TAZOBACTAM 25 GRAM(S): 3; .375 INJECTION, POWDER, LYOPHILIZED, FOR SOLUTION INTRAVENOUS at 06:38

## 2025-01-13 RX ADMIN — PIPERACILLIN AND TAZOBACTAM 25 GRAM(S): 3; .375 INJECTION, POWDER, LYOPHILIZED, FOR SOLUTION INTRAVENOUS at 13:37

## 2025-01-13 RX ADMIN — TAMSULOSIN HYDROCHLORIDE 0.4 MILLIGRAM(S): 0.4 CAPSULE ORAL at 23:19

## 2025-01-13 RX ADMIN — OSELTAMIVIR 75 MILLIGRAM(S): 75 CAPSULE ORAL at 18:13

## 2025-01-13 RX ADMIN — HEPARIN SODIUM 5000 UNIT(S): 1000 INJECTION, SOLUTION INTRAVENOUS; SUBCUTANEOUS at 17:04

## 2025-01-13 RX ADMIN — CHLORHEXIDINE GLUCONATE 1 APPLICATION(S): 1.2 RINSE ORAL at 13:34

## 2025-01-13 RX ADMIN — VANCOMYCIN HYDROCHLORIDE 250 MILLIGRAM(S): 5 INJECTION, POWDER, LYOPHILIZED, FOR SOLUTION INTRAVENOUS at 05:31

## 2025-01-13 RX ADMIN — HEPARIN SODIUM 5000 UNIT(S): 1000 INJECTION, SOLUTION INTRAVENOUS; SUBCUTANEOUS at 06:37

## 2025-01-13 RX ADMIN — HYDROCORTISONE 50 MILLIGRAM(S): 100 ENEMA RECTAL at 13:34

## 2025-01-13 RX ADMIN — LEVOTHYROXINE SODIUM 150 MICROGRAM(S): 175 TABLET ORAL at 05:10

## 2025-01-13 RX ADMIN — OLANZAPINE 1.25 MILLIGRAM(S): 15 TABLET ORAL at 02:59

## 2025-01-13 NOTE — DIETITIAN INITIAL EVALUATION ADULT - NS FNS DIET ORDER
Diet, Regular:   Consistent Carbohydrate {Evening Snack} (CSTCHOSN)  DASH/TLC {Sodium & Cholesterol Restricted} (DASH) (01-12-25 @ 09:33) [Active]

## 2025-01-13 NOTE — DIETITIAN INITIAL EVALUATION ADULT - ADD RECOMMEND
1. Recommend diet liberalization without restriction  2. Encourage PO intake and honor food preferences as able.   3. Nursing to consistently document % PO intake in RN flow sheets; monitor weekly weights, skin integrity, bowel regimen, and nutrition pertinent labs.

## 2025-01-13 NOTE — PROGRESS NOTE ADULT - SUBJECTIVE AND OBJECTIVE BOX
PROGRESS NOTE:     Patient is a 69y old  Male who presents with a chief complaint of AMS (13 Jan 2025 12:00)      SUBJECTIVE / OVERNIGHT EVENTS: pt w/ delirium.     ADDITIONAL REVIEW OF SYSTEMS:    MEDICATIONS  (STANDING):  chlorhexidine 2% Cloths 1 Application(s) Topical <User Schedule>  dextrose 5% + sodium chloride 0.45%. 1000 milliLiter(s) (75 mL/Hr) IV Continuous <Continuous>  dextrose 5%. 1000 milliLiter(s) (100 mL/Hr) IV Continuous <Continuous>  dextrose 5%. 1000 milliLiter(s) (50 mL/Hr) IV Continuous <Continuous>  dextrose 50% Injectable 25 Gram(s) IV Push once  dextrose 50% Injectable 12.5 Gram(s) IV Push once  dextrose 50% Injectable 25 Gram(s) IV Push once  glucagon  Injectable 1 milliGRAM(s) IntraMuscular once  heparin   Injectable 5000 Unit(s) SubCutaneous every 12 hours  hydrocortisone sodium succinate Injectable 50 milliGRAM(s) IV Push every 8 hours  levothyroxine 150 MICROGram(s) Oral daily  midodrine 10 milliGRAM(s) Oral every 8 hours  oseltamivir 75 milliGRAM(s) Oral two times a day  pantoprazole    Tablet 40 milliGRAM(s) Oral before breakfast  piperacillin/tazobactam IVPB.. 3.375 Gram(s) IV Intermittent every 8 hours  tamsulosin 0.4 milliGRAM(s) Oral at bedtime  vancomycin  IVPB 750 milliGRAM(s) IV Intermittent every 12 hours    MEDICATIONS  (PRN):  dextrose Oral Gel 15 Gram(s) Oral once PRN Blood Glucose LESS THAN 70 milliGRAM(s)/deciliter      CAPILLARY BLOOD GLUCOSE      POCT Blood Glucose.: 117 mg/dL (13 Jan 2025 08:45)  POCT Blood Glucose.: 176 mg/dL (13 Jan 2025 02:38)  POCT Blood Glucose.: 190 mg/dL (12 Jan 2025 21:05)    I&O's Summary    12 Jan 2025 07:01  -  13 Jan 2025 07:00  --------------------------------------------------------  IN: 0 mL / OUT: 1550 mL / NET: -1550 mL    13 Jan 2025 07:01  -  13 Jan 2025 13:44  --------------------------------------------------------  IN: 0 mL / OUT: 1200 mL / NET: -1200 mL        PHYSICAL EXAM:  Vital Signs Last 24 Hrs  T(C): 36.3 (13 Jan 2025 06:44), Max: 36.9 (13 Jan 2025 04:00)  T(F): 97.3 (13 Jan 2025 06:44), Max: 98.4 (13 Jan 2025 04:00)  HR: 60 (13 Jan 2025 08:00) (51 - 60)  BP: 108/65 (13 Jan 2025 08:00) (92/54 - 110/59)  BP(mean): --  RR: 19 (13 Jan 2025 08:00) (18 - 20)  SpO2: 99% (13 Jan 2025 08:00) (91% - 99%)    Parameters below as of 13 Jan 2025 08:00  Patient On (Oxygen Delivery Method): room air        CONSTITUTIONAL: Discomfort from [place]   RESPIRATORY: Normal respiratory effort; lungs are clear to auscultation bilaterally  CARDIOVASCULAR: Regular rate and rhythm, normal S1 and S2, no murmur/rub/gallop; No lower extremity edema; Peripheral pulses are 2+ bilaterally  ABDOMEN: Nontender to palpation, normoactive bowel sounds, no rebound/guarding; No hepatosplenomegaly  : +Mor[place] in place   MUSCLOSKELETAL: no clubbing or cyanosis of digits; no joint swelling or tenderness to palpation  PSYCH: Awake and alert, calm   NEURO: Non-focal, answers questions     LABS:                        10.4   5.53  )-----------( 123      ( 13 Jan 2025 06:55 )             31.0     01-13    138  |  105  |  15  ----------------------------<  172[H]  4.3   |  21[L]  |  0.76    Ca    8.0[L]      13 Jan 2025 06:55  Phos  3.3     01-13  Mg     1.90     01-13            Urinalysis Basic - ( 13 Jan 2025 06:55 )    Color: x / Appearance: x / SG: x / pH: x  Gluc: 172 mg/dL / Ketone: x  / Bili: x / Urobili: x   Blood: x / Protein: x / Nitrite: x   Leuk Esterase: x / RBC: x / WBC x   Sq Epi: x / Non Sq Epi: x / Bacteria: x        Urinalysis with Rflx Culture (collected 11 Jan 2025 15:00)    Culture - Blood (collected 11 Jan 2025 10:30)  Source: .Blood BLOOD  Preliminary Report (13 Jan 2025 13:01):    No growth at 48 Hours    Culture - Blood (collected 11 Jan 2025 10:20)  Source: .Blood BLOOD  Preliminary Report (13 Jan 2025 13:01):    No growth at 48 Hours        RADIOLOGY & ADDITIONAL TESTS:  Results Reviewed:   Imaging Personally Reviewed:  Electrocardiogram Personally Reviewed:    COORDINATION OF CARE:  Care Discussed with Consultants/Other Providers [Y/N]:  Prior or Outpatient Records Reviewed [Y/N]:

## 2025-01-13 NOTE — PROGRESS NOTE ADULT - SUBJECTIVE AND OBJECTIVE BOX
Chief Complaint: Concern for adrenal insufficiency    History: Patient agitated overnight refused po meds  6AM family refused hydrocortisone out of concern for causing steroid psychosis  no hypoglycemia overnight  BP improved    MEDICATIONS  (STANDING):  chlorhexidine 2% Cloths 1 Application(s) Topical <User Schedule>  dextrose 5% + sodium chloride 0.45%. 1000 milliLiter(s) (75 mL/Hr) IV Continuous <Continuous>  dextrose 5%. 1000 milliLiter(s) (100 mL/Hr) IV Continuous <Continuous>  dextrose 5%. 1000 milliLiter(s) (50 mL/Hr) IV Continuous <Continuous>  dextrose 50% Injectable 25 Gram(s) IV Push once  dextrose 50% Injectable 12.5 Gram(s) IV Push once  dextrose 50% Injectable 25 Gram(s) IV Push once  glucagon  Injectable 1 milliGRAM(s) IntraMuscular once  heparin   Injectable 5000 Unit(s) SubCutaneous every 12 hours  hydrocortisone sodium succinate Injectable 50 milliGRAM(s) IV Push every 8 hours  levothyroxine 150 MICROGram(s) Oral daily  midodrine 10 milliGRAM(s) Oral every 8 hours  oseltamivir 75 milliGRAM(s) Oral two times a day  pantoprazole    Tablet 40 milliGRAM(s) Oral before breakfast  piperacillin/tazobactam IVPB.. 3.375 Gram(s) IV Intermittent every 8 hours  tamsulosin 0.4 milliGRAM(s) Oral at bedtime  vancomycin  IVPB 750 milliGRAM(s) IV Intermittent every 12 hours    MEDICATIONS  (PRN):  dextrose Oral Gel 15 Gram(s) Oral once PRN Blood Glucose LESS THAN 70 milliGRAM(s)/deciliter      Allergies    No Known Allergies    Intolerances      Review of Systems:    UNABLE TO OBTAIN    PHYSICAL EXAM:  VITALS: T(C): 36.3 (01-13-25 @ 06:44)  T(F): 97.3 (01-13-25 @ 06:44), Max: 98.4 (01-13-25 @ 04:00)  HR: 60 (01-13-25 @ 08:00) (51 - 60)  BP: 108/65 (01-13-25 @ 08:00) (92/54 - 110/59)  RR:  (18 - 20)  SpO2:  (91% - 99%)  Wt(kg): --  GENERAL: NAD, resting in bed  EYES: No proptosis, eyes closed  HEENT:  Atraumatic, Normocephalic, moist mucous membranes  RESPIRATORY: nonlabored respirations    CAPILLARY BLOOD GLUCOSE      POCT Blood Glucose.: 117 mg/dL (13 Jan 2025 08:45)  POCT Blood Glucose.: 176 mg/dL (13 Jan 2025 02:38)  POCT Blood Glucose.: 190 mg/dL (12 Jan 2025 21:05)      01-13    138  |  105  |  15  ----------------------------<  172[H]  4.3   |  21[L]  |  0.76    eGFR: 97    Ca    8.0[L]      01-13  Mg     1.90     01-13  Phos  3.3     01-13    TPro  7.2  /  Alb  4.4  /  TBili  0.7  /  DBili  x   /  AST  30  /  ALT  11  /  AlkPhos  63  01-11      A1C with Estimated Average Glucose Result: 4.9 % (01-11-25 @ 23:56)      Thyroid Function Tests:  01-11 @ 23:56 TSH -- FreeT4 0.8 T3 -- Anti TPO -- Anti Thyroglobulin Ab -- TSI --  01-11 @ 23:43 TSH 1.05 FreeT4 -- T3 -- Anti TPO -- Anti Thyroglobulin Ab -- TSI --

## 2025-01-13 NOTE — DIETITIAN INITIAL EVALUATION ADULT - PERTINENT LABORATORY DATA
01-13    138  |  105  |  15  ----------------------------<  172[H]  4.3   |  21[L]  |  0.76    Ca    8.0[L]      13 Jan 2025 06:55  Phos  3.3     01-13  Mg     1.90     01-13    POCT Blood Glucose.: 117 mg/dL (01-13-25 @ 08:45)  A1C with Estimated Average Glucose Result: 4.9 % (01-11-25 @ 23:56)  
risk factors

## 2025-01-13 NOTE — DIETITIAN INITIAL EVALUATION ADULT - PERTINENT MEDS FT
MEDICATIONS  (STANDING):  chlorhexidine 2% Cloths 1 Application(s) Topical <User Schedule>  dextrose 5% + sodium chloride 0.45%. 1000 milliLiter(s) (75 mL/Hr) IV Continuous <Continuous>  dextrose 5%. 1000 milliLiter(s) (100 mL/Hr) IV Continuous <Continuous>  dextrose 5%. 1000 milliLiter(s) (50 mL/Hr) IV Continuous <Continuous>  dextrose 50% Injectable 25 Gram(s) IV Push once  dextrose 50% Injectable 12.5 Gram(s) IV Push once  dextrose 50% Injectable 25 Gram(s) IV Push once  glucagon  Injectable 1 milliGRAM(s) IntraMuscular once  heparin   Injectable 5000 Unit(s) SubCutaneous every 12 hours  hydrocortisone sodium succinate Injectable 50 milliGRAM(s) IV Push every 8 hours  levothyroxine 150 MICROGram(s) Oral daily  oseltamivir 75 milliGRAM(s) Oral two times a day  pantoprazole    Tablet 40 milliGRAM(s) Oral before breakfast  piperacillin/tazobactam IVPB.. 3.375 Gram(s) IV Intermittent every 8 hours  tamsulosin 0.4 milliGRAM(s) Oral at bedtime    MEDICATIONS  (PRN):  dextrose Oral Gel 15 Gram(s) Oral once PRN Blood Glucose LESS THAN 70 milliGRAM(s)/deciliter

## 2025-01-13 NOTE — DIETITIAN INITIAL EVALUATION ADULT - REASON FOR ADMISSION
Influenza with other respiratory manifestations, other influenza virus identified    Per chart review, patient is a 70 y/o M with pmhx of DM2, HTN, Stomach cancer, Thyroid cancer, presented to the ED for new onset altered mental status. Found to have flu positive. Also septic. Admit for IV abx and flu management, hypoglycemia work up.

## 2025-01-13 NOTE — DIETITIAN INITIAL EVALUATION ADULT - OTHER INFO
Patient seen for MST scores 2 or greater. Noted diet order: CSTCHOSN with DASH/TLC. Noted on dextrose 5% + sodium chloride 0.45% for hydration and med. No reported difficulty chewing or swallowing on current diet consistency. Daughter reports patient consumed yogurt, juice, and 1/3 of breakfast entrees. Menu offered to optimize nutritional status. No reported nausea, vomit, diarrhea, constipation, last BM documented 1/12 per RN flow sheets. Labs notable for HgbA1c@ 4.9% (1/11%) with POCT 59-190mg/dl. Of note, patient noted episodes of hypoglycemia with steroid use. Dosing weight is 54.4kg (1/11)- a stated weight, RD unable to verify weight accuracy, RN made aware to obtain reweight. Dosing weight is reflecting a 12.7kg/28 lbs/ 18.9% weight loss in 3-4 months, and 17kg/37 lbs/23.8% weight loss in 6 months -1 year (71.4kg on 6/10/24, 1/10/24). Recommend diet liberalization without restriction due to presumed low oral intake, hypoglycemia with good glycemic control. Daughter is not amenable for ONS at this time.

## 2025-01-13 NOTE — DIETITIAN INITIAL EVALUATION ADULT - PROBLEM/PLAN-1
This cm checked promise portal to see if ct's MAWD was approved.     Ct was made aware her MAWD was approved. Ct agreeable to [facility] being her insurance selection.     Ct let this cm know she previously had [facility] as her insurance for MA.   
DISPLAY PLAN FREE TEXT

## 2025-01-14 LAB
ANION GAP SERPL CALC-SCNC: 11 MMOL/L — SIGNIFICANT CHANGE UP (ref 7–14)
BUN SERPL-MCNC: 11 MG/DL — SIGNIFICANT CHANGE UP (ref 7–23)
CALCIUM SERPL-MCNC: 8.2 MG/DL — LOW (ref 8.4–10.5)
CHLORIDE SERPL-SCNC: 110 MMOL/L — HIGH (ref 98–107)
CO2 SERPL-SCNC: 22 MMOL/L — SIGNIFICANT CHANGE UP (ref 22–31)
CREAT SERPL-MCNC: 0.62 MG/DL — SIGNIFICANT CHANGE UP (ref 0.5–1.3)
EGFR: 103 ML/MIN/1.73M2 — SIGNIFICANT CHANGE UP
GLUCOSE BLDC GLUCOMTR-MCNC: 113 MG/DL — HIGH (ref 70–99)
GLUCOSE BLDC GLUCOMTR-MCNC: 120 MG/DL — HIGH (ref 70–99)
GLUCOSE BLDC GLUCOMTR-MCNC: 126 MG/DL — HIGH (ref 70–99)
GLUCOSE BLDC GLUCOMTR-MCNC: 126 MG/DL — HIGH (ref 70–99)
GLUCOSE BLDC GLUCOMTR-MCNC: 157 MG/DL — HIGH (ref 70–99)
GLUCOSE BLDC GLUCOMTR-MCNC: 200 MG/DL — HIGH (ref 70–99)
GLUCOSE BLDC GLUCOMTR-MCNC: 58 MG/DL — LOW (ref 70–99)
GLUCOSE BLDC GLUCOMTR-MCNC: 71 MG/DL — SIGNIFICANT CHANGE UP (ref 70–99)
GLUCOSE SERPL-MCNC: 116 MG/DL — HIGH (ref 70–99)
HCT VFR BLD CALC: 32.5 % — LOW (ref 39–50)
HGB BLD-MCNC: 11 G/DL — LOW (ref 13–17)
MAGNESIUM SERPL-MCNC: 1.8 MG/DL — SIGNIFICANT CHANGE UP (ref 1.6–2.6)
MCHC RBC-ENTMCNC: 28.1 PG — SIGNIFICANT CHANGE UP (ref 27–34)
MCHC RBC-ENTMCNC: 33.8 G/DL — SIGNIFICANT CHANGE UP (ref 32–36)
MCV RBC AUTO: 83.1 FL — SIGNIFICANT CHANGE UP (ref 80–100)
NRBC # BLD: 0 /100 WBCS — SIGNIFICANT CHANGE UP (ref 0–0)
NRBC # FLD: 0 K/UL — SIGNIFICANT CHANGE UP (ref 0–0)
PHOSPHATE SERPL-MCNC: 2.1 MG/DL — LOW (ref 2.5–4.5)
PLATELET # BLD AUTO: 150 K/UL — SIGNIFICANT CHANGE UP (ref 150–400)
POTASSIUM SERPL-MCNC: 4 MMOL/L — SIGNIFICANT CHANGE UP (ref 3.5–5.3)
POTASSIUM SERPL-SCNC: 4 MMOL/L — SIGNIFICANT CHANGE UP (ref 3.5–5.3)
RBC # BLD: 3.91 M/UL — LOW (ref 4.2–5.8)
RBC # FLD: 14.1 % — SIGNIFICANT CHANGE UP (ref 10.3–14.5)
SODIUM SERPL-SCNC: 143 MMOL/L — SIGNIFICANT CHANGE UP (ref 135–145)
WBC # BLD: 5.9 K/UL — SIGNIFICANT CHANGE UP (ref 3.8–10.5)
WBC # FLD AUTO: 5.9 K/UL — SIGNIFICANT CHANGE UP (ref 3.8–10.5)

## 2025-01-14 PROCEDURE — 99233 SBSQ HOSP IP/OBS HIGH 50: CPT

## 2025-01-14 RX ORDER — LEVOTHYROXINE SODIUM 175 UG/1
1 TABLET ORAL
Refills: 0 | DISCHARGE

## 2025-01-14 RX ORDER — MESALAMINE 375 MG/1
3 CAPSULE, EXTENDED RELEASE ORAL
Refills: 0 | DISCHARGE

## 2025-01-14 RX ORDER — TAMSULOSIN HYDROCHLORIDE 0.4 MG/1
0.8 CAPSULE ORAL AT BEDTIME
Refills: 0 | Status: DISCONTINUED | OUTPATIENT
Start: 2025-01-14 | End: 2025-01-15

## 2025-01-14 RX ORDER — HYDROCORTISONE 100 MG/60ML
25 ENEMA RECTAL EVERY 8 HOURS
Refills: 0 | Status: DISCONTINUED | OUTPATIENT
Start: 2025-01-14 | End: 2025-01-15

## 2025-01-14 RX ORDER — LISINOPRIL 30 MG/1
1 TABLET ORAL
Refills: 0 | DISCHARGE

## 2025-01-14 RX ORDER — SUCRALFATE 1 G/10ML
1 SUSPENSION ORAL
Refills: 0 | DISCHARGE

## 2025-01-14 RX ORDER — FINASTERIDE 5 MG
5 TABLET ORAL DAILY
Refills: 0 | Status: DISCONTINUED | OUTPATIENT
Start: 2025-01-14 | End: 2025-01-15

## 2025-01-14 RX ORDER — SUCRALFATE 1 G/10ML
1 SUSPENSION ORAL
Refills: 0 | Status: DISCONTINUED | OUTPATIENT
Start: 2025-01-14 | End: 2025-01-15

## 2025-01-14 RX ADMIN — LEVOTHYROXINE SODIUM 150 MICROGRAM(S): 175 TABLET ORAL at 04:34

## 2025-01-14 RX ADMIN — OSELTAMIVIR 75 MILLIGRAM(S): 75 CAPSULE ORAL at 17:40

## 2025-01-14 RX ADMIN — Medication 5 MILLIGRAM(S): at 22:08

## 2025-01-14 RX ADMIN — OSELTAMIVIR 75 MILLIGRAM(S): 75 CAPSULE ORAL at 06:30

## 2025-01-14 RX ADMIN — TAMSULOSIN HYDROCHLORIDE 0.8 MILLIGRAM(S): 0.4 CAPSULE ORAL at 22:08

## 2025-01-14 RX ADMIN — SODIUM CHLORIDE 75 MILLILITER(S): 9 INJECTION, SOLUTION INTRAVENOUS at 08:51

## 2025-01-14 RX ADMIN — HEPARIN SODIUM 5000 UNIT(S): 1000 INJECTION, SOLUTION INTRAVENOUS; SUBCUTANEOUS at 06:30

## 2025-01-14 RX ADMIN — HYDROCORTISONE 25 MILLIGRAM(S): 100 ENEMA RECTAL at 22:08

## 2025-01-14 RX ADMIN — PIPERACILLIN AND TAZOBACTAM 25 GRAM(S): 3; .375 INJECTION, POWDER, LYOPHILIZED, FOR SOLUTION INTRAVENOUS at 22:12

## 2025-01-14 RX ADMIN — PANTOPRAZOLE 40 MILLIGRAM(S): 40 TABLET, DELAYED RELEASE ORAL at 06:30

## 2025-01-14 RX ADMIN — HYDROCORTISONE 50 MILLIGRAM(S): 100 ENEMA RECTAL at 06:30

## 2025-01-14 RX ADMIN — PIPERACILLIN AND TAZOBACTAM 25 GRAM(S): 3; .375 INJECTION, POWDER, LYOPHILIZED, FOR SOLUTION INTRAVENOUS at 06:31

## 2025-01-14 RX ADMIN — HEPARIN SODIUM 5000 UNIT(S): 1000 INJECTION, SOLUTION INTRAVENOUS; SUBCUTANEOUS at 17:40

## 2025-01-14 RX ADMIN — PIPERACILLIN AND TAZOBACTAM 25 GRAM(S): 3; .375 INJECTION, POWDER, LYOPHILIZED, FOR SOLUTION INTRAVENOUS at 13:50

## 2025-01-14 RX ADMIN — CHLORHEXIDINE GLUCONATE 1 APPLICATION(S): 1.2 RINSE ORAL at 07:49

## 2025-01-14 RX ADMIN — HYDROCORTISONE 25 MILLIGRAM(S): 100 ENEMA RECTAL at 14:21

## 2025-01-14 NOTE — PROGRESS NOTE ADULT - PROBLEM SELECTOR PLAN 4
- likely d/t acute infection and possible steroid psychosis   - CT head w/ no acute findings   - dose of steroids decreased as per Endo   - avoid benzos, anticholinergics   - antibiotics as above
- the patient presented with persistent hypoglycemia 2/2 adrenal insufficiency  - hx of DM in the past but currently not on any DM medications or insulin  - c peptide 1.9, BHB negative  - endocrine recs appreciated  - f/u serum insulin, insulin Abs, sulfonylurea panel  - if hypoglycemic again will send pro-insulin  - treat adrenal insufficiency as above
- likely d/t acute infection and possible steroid psychosis   - CT head w/ no acute findings   - dose of steroids decreased as per Endo   - avoid benzos, anticholinergics   - antibiotics as above  - mental status improved

## 2025-01-14 NOTE — PROGRESS NOTE ADULT - PROBLEM SELECTOR PLAN 7
- heparin subq for dvt ppx
- patient with hx of hypothyroidism secondary to complete thyroidectomy  - endocrine recs appreciated  - continue synthroid 150 mcg
- patient with hx of hypothyroidism secondary to complete thyroidectomy  - endocrine recs appreciated  - continue synthroid 150 mcg

## 2025-01-14 NOTE — SWALLOW BEDSIDE ASSESSMENT ADULT - COMMENTS
Hospitalist 1/13, "70 y/o M with pmhx of DM2, HTN, Stomach cancer, Thyroid cancer, presented to the ED for new onset altered mental status. Found to have flu positive. Also septic. Admit for IV abx and flu management, hypoglycemia work up."    CT Chest 1/12: IMPRESSION: Right lower lobe 0.4 cm peripheral nodular opacity with mild surrounding tree-in-bud nodular opacities, likely infectious in etiology. Follow-up chest CT suggested in 6 months to evaluate for interval resolution.    Patient received upright awake/ alert in bed, able to make wants/ needs known and follow simple directives. Patient denies difficulty with PO intake.

## 2025-01-14 NOTE — PROGRESS NOTE ADULT - PROBLEM SELECTOR PLAN 8
- heparin subq for dvt ppx    POC d/w daughter  Spoke w/ patient again, agrees to stay inpatient
- heparin subq for dvt ppx    POC d/w daughters

## 2025-01-14 NOTE — PHYSICAL THERAPY INITIAL EVALUATION ADULT - PATIENT PROFILE REVIEW, REHAB EVAL
ACTIVITY ORDER: Ambulate with Assistance; Spoke with AYUSH Abbott prior to PT evaluation-->Pt OK for PT consult/OOB activity./yes

## 2025-01-14 NOTE — PROGRESS NOTE ADULT - PROBLEM SELECTOR PLAN 3
- endo recs appreciated  - Cortisol level 0.9 is very low in setting of stress, consistent with adrenal insufficiency  Plan:  - Continue IV hydrocortisone 100mg IV q8h
- c/f adrenal insufficiency given low Cortisol level and hypotension/hypoglycemia   - endo recs appreciated  - Cortisol level 0.9 is very low in setting of stress  - decrease IV hydrocortisone to 50mg IV q8h
- c/f adrenal insufficiency given low Cortisol level and hypotension/hypoglycemia   - endo recs appreciated  - Cortisol level 0.9 is very low in setting of stress  - spoke w/ Endo, will decrease IV hydrocortisone to 25mg IV q8h

## 2025-01-14 NOTE — PROGRESS NOTE ADULT - SUBJECTIVE AND OBJECTIVE BOX
Chief Complaint: Concern for adrenal insufficiency    History: Patient states he wants to leave the hospital  explained at length the workup for adrenal insufficiency that this is a life threatening condition if present and untreated.  Patient reports multiple times he wants to leave    MEDICATIONS  (STANDING):  chlorhexidine 2% Cloths 1 Application(s) Topical <User Schedule>  dextrose 5% + sodium chloride 0.45%. 1000 milliLiter(s) (75 mL/Hr) IV Continuous <Continuous>  dextrose 5%. 1000 milliLiter(s) (50 mL/Hr) IV Continuous <Continuous>  dextrose 5%. 1000 milliLiter(s) (100 mL/Hr) IV Continuous <Continuous>  dextrose 50% Injectable 25 Gram(s) IV Push once  dextrose 50% Injectable 12.5 Gram(s) IV Push once  dextrose 50% Injectable 25 Gram(s) IV Push once  glucagon  Injectable 1 milliGRAM(s) IntraMuscular once  heparin   Injectable 5000 Unit(s) SubCutaneous every 12 hours  hydrocortisone sodium succinate Injectable 25 milliGRAM(s) IV Push every 8 hours  levothyroxine 150 MICROGram(s) Oral daily  oseltamivir 75 milliGRAM(s) Oral two times a day  pantoprazole    Tablet 40 milliGRAM(s) Oral before breakfast  piperacillin/tazobactam IVPB.. 3.375 Gram(s) IV Intermittent every 8 hours  tamsulosin 0.4 milliGRAM(s) Oral at bedtime    MEDICATIONS  (PRN):  dextrose Oral Gel 15 Gram(s) Oral once PRN Blood Glucose LESS THAN 70 milliGRAM(s)/deciliter      Allergies    No Known Allergies    Intolerances      Review of Systems:  ALL OTHER SYSTEMS REVIEWED AND NEGATIVE      PHYSICAL EXAM:  VITALS: T(C): 36.6 (01-14-25 @ 10:30)  T(F): 97.9 (01-14-25 @ 10:30), Max: 98.4 (01-14-25 @ 04:00)  HR: 62 (01-14-25 @ 10:30) (62 - 78)  BP: 110/64 (01-14-25 @ 10:30) (105/64 - 132/72)  RR:  (16 - 19)  SpO2:  (98% - 99%)  Wt(kg): --  GENERAL: NAD, well-groomed, well-developed  EYES: No proptosis, no lid lag, anicteric  HEENT:  Atraumatic, Normocephalic, moist mucous membranes  RESPIRATORY: nonlabored respirations  PSYCH: Alert and oriented x 3, normal affect, normal mood    CAPILLARY BLOOD GLUCOSE      POCT Blood Glucose.: 200 mg/dL (14 Jan 2025 12:14)  POCT Blood Glucose.: 113 mg/dL (14 Jan 2025 08:34)  POCT Blood Glucose.: 126 mg/dL (14 Jan 2025 04:33)  POCT Blood Glucose.: 212 mg/dL (13 Jan 2025 21:38)  POCT Blood Glucose.: 86 mg/dL (13 Jan 2025 15:24)      01-14    143  |  110[H]  |  11  ----------------------------<  116[H]  4.0   |  22  |  0.62    eGFR: 103    Ca    8.2[L]      01-14  Mg     1.80     01-14  Phos  2.1     01-14        A1C with Estimated Average Glucose Result: 4.9 % (01-11-25 @ 23:56)      Thyroid Function Tests:  01-11 @ 23:56 TSH -- FreeT4 0.8 T3 -- Anti TPO -- Anti Thyroglobulin Ab -- TSI --  01-11 @ 23:43 TSH 1.05 FreeT4 -- T3 -- Anti TPO -- Anti Thyroglobulin Ab -- TSI --

## 2025-01-14 NOTE — PROGRESS NOTE ADULT - PROBLEM SELECTOR PLAN 1
- severe sepsis POA d/t Influenza/Pneumonia   - CT chest: Right lower lobe 0.4 cm peripheral nodular opacity with mild surrounding tree-in-bud nodular opacities  - c/w Zosyn, d/c IV Vanco   - c/w Tamiflu   - f/u MRSA swab  - urine legionella neg   - blood cultures NGTD
- severe sepsis POA d/t Influenza/Pneumonia   - CT chest: Right lower lobe 0.4 cm peripheral nodular opacity with mild surrounding tree-in-bud nodular opacities  - c/w Zosyn   - c/w Tamiflu   - MRSA swab neg  - urine legionella neg   - blood cultures NGTD  - repeat CT chest in 6 months
- patient presented with fever and low blood pressure, concerning for sepsis  - CT scan only shows basilar bronchial wall thickening  - will cover empirically with vanc and zosyn  - f/u MRSA swab  - f/u sputum cultures if makes sputum  - f/u BCx  - Vital signs Q4hr

## 2025-01-14 NOTE — PROGRESS NOTE ADULT - PROBLEM SELECTOR PLAN 2
- continue Tamiflu  - isolation precautions

## 2025-01-14 NOTE — PROGRESS NOTE ADULT - PROBLEM SELECTOR PLAN 6
- has h/o BPH   - Morales placed   - start Flomax   - TOV tonight
- med list provided by khari at bedside on phone, confirmed with [facility] pharmacist
- has h/o BPH   - s/p Morales   - undergoing TOV, has required straight cath x 2   - c/w Flomax 0.8mg qhs

## 2025-01-14 NOTE — PROVIDER CONTACT NOTE (OTHER) - SITUATION
Pt HR colten, went down to lowest of 36 bpm. Pt oxygen saturation also jumping from 85% to 95%+ while sleeping on room air. Vitals taken and patient remains hypotensive, with diastolic BP 39
patient refused morning medications including midodrine, tamiflu, protonix & solu-cortef
69-year-old male patient past medical history DM type II (A1c 4.9), HTN, stomach cancer

## 2025-01-14 NOTE — PHYSICAL THERAPY INITIAL EVALUATION ADULT - GENERAL OBSERVATIONS, REHAB EVAL
Pt encountered in semisupine position, no distress, AxOx4, with +IV, +cardiac monitor, +pulse oximeter, and daughter at bedside. Pt agreeable to participate in PT evaluation. Vitals taken; /72mmHg,  heart rate 60bpm, SpO2 97%.

## 2025-01-14 NOTE — PROVIDER CONTACT NOTE (OTHER) - BACKGROUND
patient admit dx flu, hypotensive & afebrile upon admission
(Admit Diagnosis) Influenza with other respiratory manifestations, other influenza virus identified  (PMH) Helicobacter pylori infection  (PMH) History of BPH
patient admit dx flu, hx hypoglycemia, hypotension

## 2025-01-14 NOTE — PHYSICAL THERAPY INITIAL EVALUATION ADULT - ADDITIONAL COMMENTS
Pt lives in a private house with his wife and daughter with 3 steps to enter; (+)bilateral handrails; pt has bedrooms both on the first floor and 2nd floor; (+)1 handrail. Prior to hospital admission, pt was completely independent and used no assistive device with ambulation. Pt denies any recent falls, although pt was found on ground before coming to the hospital.    Pt left comfortable seated at edge of bed, NAD, all lines intact, all precautions maintained, with call bell in reach, daughter at bedside, and AYUSH Schmitt aware of PT evaluation.

## 2025-01-14 NOTE — SWALLOW BEDSIDE ASSESSMENT ADULT - ADDITIONAL RECOMMENDATIONS
1. This service to follow for diet tolerance as schedule permits. 2. Medical team advised to reconsult this service if patient is with a change in medical status or change in tolerance of recommended PO 3. SLP provided patient/ ACP with education regarding diet/ cinesophagram to be scheduled as inpatient for medical management; however, if patient is discharged today please provide patient with a script to patient and the following information to schedule outpatient cinesophagram/ modified barium swallow study. Radiology scheduling office phone #[phone] and fax #[phone] and outpatient [facility] [phone] for patient to schedule outpatient cinesophagram/ modified barium swallow study.

## 2025-01-14 NOTE — PROVIDER CONTACT NOTE (OTHER) - REASON
patient refused morning medications
Bladder scan
patient HR colten, O2 saturating jumping, diastolic bp low

## 2025-01-14 NOTE — PROGRESS NOTE ADULT - SUBJECTIVE AND OBJECTIVE BOX
PROGRESS NOTE:     Patient is a 69y old  Male who presents with a chief complaint of AMS (14 Jan 2025 12:54)      SUBJECTIVE / OVERNIGHT EVENTS: no acute events.     ADDITIONAL REVIEW OF SYSTEMS:    MEDICATIONS  (STANDING):  chlorhexidine 2% Cloths 1 Application(s) Topical <User Schedule>  dextrose 5% + sodium chloride 0.45%. 1000 milliLiter(s) (75 mL/Hr) IV Continuous <Continuous>  dextrose 5%. 1000 milliLiter(s) (100 mL/Hr) IV Continuous <Continuous>  dextrose 5%. 1000 milliLiter(s) (50 mL/Hr) IV Continuous <Continuous>  dextrose 50% Injectable 25 Gram(s) IV Push once  dextrose 50% Injectable 12.5 Gram(s) IV Push once  dextrose 50% Injectable 25 Gram(s) IV Push once  glucagon  Injectable 1 milliGRAM(s) IntraMuscular once  heparin   Injectable 5000 Unit(s) SubCutaneous every 12 hours  hydrocortisone sodium succinate Injectable 25 milliGRAM(s) IV Push every 8 hours  levothyroxine 150 MICROGram(s) Oral daily  oseltamivir 75 milliGRAM(s) Oral two times a day  pantoprazole    Tablet 40 milliGRAM(s) Oral before breakfast  piperacillin/tazobactam IVPB.. 3.375 Gram(s) IV Intermittent every 8 hours  tamsulosin 0.4 milliGRAM(s) Oral at bedtime    MEDICATIONS  (PRN):  dextrose Oral Gel 15 Gram(s) Oral once PRN Blood Glucose LESS THAN 70 milliGRAM(s)/deciliter      CAPILLARY BLOOD GLUCOSE      POCT Blood Glucose.: 200 mg/dL (14 Jan 2025 12:14)  POCT Blood Glucose.: 113 mg/dL (14 Jan 2025 08:34)  POCT Blood Glucose.: 126 mg/dL (14 Jan 2025 04:33)  POCT Blood Glucose.: 212 mg/dL (13 Jan 2025 21:38)  POCT Blood Glucose.: 86 mg/dL (13 Jan 2025 15:24)    I&O's Summary    13 Jan 2025 07:01  -  14 Jan 2025 07:00  --------------------------------------------------------  IN: 0 mL / OUT: 3250 mL / NET: -3250 mL    14 Jan 2025 07:01  -  14 Jan 2025 15:12  --------------------------------------------------------  IN: 0 mL / OUT: 1410 mL / NET: -1410 mL        PHYSICAL EXAM:  Vital Signs Last 24 Hrs  T(C): 36.8 (14 Jan 2025 14:20), Max: 36.9 (14 Jan 2025 04:00)  T(F): 98.2 (14 Jan 2025 14:20), Max: 98.4 (14 Jan 2025 04:00)  HR: 62 (14 Jan 2025 14:20) (62 - 78)  BP: 112/65 (14 Jan 2025 14:20) (105/64 - 132/72)  BP(mean): --  RR: 18 (14 Jan 2025 14:20) (16 - 19)  SpO2: 100% (14 Jan 2025 14:20) (98% - 100%)    Parameters below as of 14 Jan 2025 14:20  Patient On (Oxygen Delivery Method): room air        CONSTITUTIONAL: NAD, well-developed  RESPIRATORY: Normal respiratory effort; lungs are clear to auscultation bilaterally  CARDIOVASCULAR: Regular rate and rhythm, normal S1 and S2, no murmur/rub/gallop; No lower extremity edema; Peripheral pulses are 2+ bilaterally  ABDOMEN: Nontender to palpation, normoactive bowel sounds, no rebound/guarding; No hepatosplenomegaly  MUSCLOSKELETAL: no clubbing or cyanosis of digits; no joint swelling or tenderness to palpation  PSYCH: A+O to person, place, and time; affect appropriate    LABS:                        11.0   5.90  )-----------( 150      ( 14 Jan 2025 07:00 )             32.5     01-14    143  |  110[H]  |  11  ----------------------------<  116[H]  4.0   |  22  |  0.62    Ca    8.2[L]      14 Jan 2025 07:00  Phos  2.1     01-14  Mg     1.80     01-14            Urinalysis Basic - ( 14 Jan 2025 07:00 )    Color: x / Appearance: x / SG: x / pH: x  Gluc: 116 mg/dL / Ketone: x  / Bili: x / Urobili: x   Blood: x / Protein: x / Nitrite: x   Leuk Esterase: x / RBC: x / WBC x   Sq Epi: x / Non Sq Epi: x / Bacteria: x          RADIOLOGY & ADDITIONAL TESTS:  Results Reviewed:   Imaging Personally Reviewed:  Electrocardiogram Personally Reviewed:    COORDINATION OF CARE:  Care Discussed with Consultants/Other Providers [Y/N]:  Prior or Outpatient Records Reviewed [Y/N]:

## 2025-01-14 NOTE — PROGRESS NOTE ADULT - NUTRITIONAL ASSESSMENT
This patient has been assessed with a concern for Malnutrition and has been determined to have a diagnosis/diagnoses of Severe protein-calorie malnutrition and Underweight (BMI < 19).    This patient is being managed with:   Diet Regular-  Entered: Jan 13 2025  4:46PM

## 2025-01-14 NOTE — PHARMACOTHERAPY INTERVENTION NOTE - COMMENTS
Medication history update: Medication history is complete. Medication list updated in Outpatient Medication Record (OMR) per [facility], patient's daughter Kaya, and spouse spouse Dez. Per Endocrinology note 6/11/24 in HIE tab, patient has been off of metformin. No other recently dispensed diabetes medications per [facility]. Please note, concern for non-adherence as patient has inconsistent fill history with [facility] (see OMR for medication-specific notations and fill dates). Per patient's spouse, patient is no longer taking mesalamine or pantoprazole; patient is currently taking sucralfate 1g tablet orally once a day (prescribed with directions to take 1 g orally 2 times a day, last dispensed Atiya 2024 x 90-day supply).    Home Medications:  levothyroxine 150 mcg (0.15 mg) oral tablet: 1 tab(s) orally once a day in the morning (14 Jan 2025 13:36)  lisinopril 2.5 mg oral tablet: 1 tab(s) orally once a day (90-day supply last dispensed in June 2024) (14 Jan 2025 13:36)  sucralfate 1 g oral tablet: 1 tab(s) orally once a day (per pharmacy, directions state to take 1g tablet 2 times a day; per spouse patient only takes 1g once daily; 90-day supply last dispensed in June 2024) (14 Jan 2025 13:36)  tamsulosin 0.4 mg oral capsule: 2 cap(s) orally once a day (at bedtime) (90-day supply last dispensed in June 2024) (14 Jan 2025 13:36)      Please call spectra [id number] if you have any questions.  Medication history update: Medication history is complete. Medication list updated in Outpatient Medication Record (OMR) per [facility], patient's daughter Kaya, and spouse Dez. Per Endocrinology note 6/11/24 in HIE tab, patient has been off of metformin. No other recently dispensed diabetes medications per hospitals pharmacy. Please note, concern for non-adherence as patient has inconsistent fill history with [facility] Pharmacy (see OMR for medication-specific notations and fill dates). Per patient's spouse, patient is no longer taking mesalamine or pantoprazole; patient is currently taking sucralfate 1g tablet orally once a day (prescribed with directions to take 1 g orally 2 times a day, last dispensed Atiya 2024 x 90-day supply).    Home Medications:  levothyroxine 150 mcg (0.15 mg) oral tablet: 1 tab(s) orally once a day in the morning (14 Jan 2025 13:36)  lisinopril 2.5 mg oral tablet: 1 tab(s) orally once a day (90-day supply last dispensed in June 2024) (14 Jan 2025 13:36)  sucralfate 1 g oral tablet: 1 tab(s) orally once a day (per pharmacy, directions state to take 1g tablet 2 times a day; per spouse patient only takes 1g once daily; 90-day supply last dispensed in June 2024) (14 Jan 2025 13:36)  tamsulosin 0.4 mg oral capsule: 2 cap(s) orally once a day (at bedtime) (90-day supply last dispensed in June 2024) (14 Jan 2025 13:36)      Please call spectra [phone] if you have any questions.

## 2025-01-14 NOTE — PROGRESS NOTE ADULT - PROBLEM SELECTOR PLAN 5
- the patient presented with persistent hypoglycemia likely 2/2 adrenal insufficiency  - hx of DM in the past but currently not on any DM medications or insulin  - c peptide 1.9, BHB negative  - endocrine recs appreciated  - f/u serum insulin, insulin Abs, sulfonylurea panel  - if hypoglycemic again will send pro-insulin  - treat adrenal insufficiency as above
- patient with hx of hypothyroidism secondary to complete thyroidectomy  - family member endorsed that he did not take his synthroid dose  - endocrine recs appreciated  - continue synthroid 150 mcg
- the patient presented with persistent hypoglycemia likely 2/2 adrenal insufficiency  - hx of DM in the past but currently not on any DM medications or insulin  - c peptide 1.9, BHB negative  - endocrine recs appreciated  - f/u serum insulin, insulin Abs, sulfonylurea panel  - if hypoglycemic again will send pro-insulin  - treat adrenal insufficiency as above

## 2025-01-14 NOTE — PROGRESS NOTE ADULT - ASSESSMENT
70 y/o M with pmhx of DM2, HTN, Stomach cancer, Thyroid cancer, presented to the ED for new onset altered mental status. Found to have flu positive. Also septic. Admit for IV abx and flu management, hypoglycemia work up.
70 y/o M with Gastric cancer s/p chemo and immunotherapy, remote hx of papillary thyroid cancer, DM2 in remission, HTN who presents with AMS. He was found down by family at home. Normally AOx3. Upon presentation, found to be septic, hypoglycemic, and hypotensive. Septic due to Influenza A.    Endocrine consulted for: Hypoglycemia    #Hypoglycemia  #Hypotension  #Adrenal insufficiency  History obtained from daughter at bedside due to patient's confusion  Patient has hx of gastric cancer and is seen by oncologist Dr. Albert Mercedes.  He was previously on chemotherapy and Pembrolizumab (completed treatment 12/2023).  He has no prior known diagnosis of adrenal insufficiency.  He previously had T2DM but after significant weight loss with gastric cancer, DM in remission, has not been on any diabetes medications for about 1 year.  Patient had recurrent hypoglycemia since presentation despite several amps of D50 and D5W, which was then increased to D10.   Patient then developed hypotension. Found to be septic due to influenza A.  Given hypoglycemia and hypotension, empiric stress dose steroids administered: Hydrocortisone 100mg IV q8h since 1/12 00:02.  Cortisol level checked before steroids started was low to 0.9 (1/11 23:56). Very low in setting of stress, consistent with adrenal insufficiency. 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    1/13/25 - family refused 6AM hydrocortisone out of concern for steroid psychosis while patient on hydrocortisone 100mg IV q8h. Last night patient with agitation.  Extensive discussion with daughter at bedside and with another daughter Kaya ([phone]) by phone.    Recommendations:  - Reduce hydrocortisone to 50mg IV q8h for now. Give dose now as dose was missed this AM. Will taper as able daily based on clinical status.  If patient is hyemodynamically stable please reduce hydrocortisone to 25mg IV q8h starting morning of 1/14/25.  - Once steroid tapered to physiologic dose, will aim to recheck HPA axis.  -Family is concerned about patient staying in hospital and would like taper to happen as quickly as possible.  - Continue treatment and management for sepsis per primary team  - Wean D5NS as tolerated  - Can check FS q6h  -Hypoglycemia may have been in setting of adrenal insufficiency. Hypoglycemia now resolved on steroid treatment.  - For complete workup, follow up sulfonylurea panel.  - Ensure hypoglycemia protocol orders are in place  - if FSG <60 or if patient has symptoms of hypoglycemia, PRIOR TO TREATING please draw the following STAT LABS to better assess the cause of hypoglycemia, so we can determine the best treatment:  1) BMP  2) serum insulin  3) c-peptide  4) pro-insulin  5) Insulin antibodies  6) Beta hydroxybutyrate    #Postoperative hypothyroidism  #Hx of papillary thyroid cancer  He also has remote hx of papillary thyroid cancer diagnosed in 1990s. Follows with Dr. Sher outpatient.  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  TSH 1.05-3.18, Free T4 mildly low 0.8  - Recommend continue Levothyroxine 150mcg daily      Patient will need follow up with Dr. Sher on discharge. Currently has follow up appt scheduled with Dr. Sher on 6/2 at 11:40AM at [facility].      Complex patient high level decision making.  Discussed with primary team.    Verónica Gutierrez MD  Division of Endocrinology  Pager: [phone]    If after 6PM or before 9AM, or on weekends/holidays, please call endocrine answering service for assistance ([phone]).  For nonurgent matters email [email or web address] for assistance.
70 y/o M with Gastric cancer s/p chemo and immunotherapy, remote hx of papillary thyroid cancer, DM2 in remission, HTN who presents with AMS. He was found down by family at home. Normally AOx3. Upon presentation, found to be septic, hypoglycemic, and hypotensive. Septic due to Influenza A.    Endocrine consulted for: Hypoglycemia    #Hypoglycemia  #Hypotension  #Adrenal insufficiency  History obtained from daughter at bedside due to patient's confusion  Patient has hx of gastric cancer and is seen by oncologist Dr. Albert Mercedes.  He was previously on chemotherapy and Pembrolizumab (completed treatment 12/2023).  He has no prior known diagnosis of adrenal insufficiency.  He previously had T2DM but after significant weight loss with gastric cancer, DM in remission, has not been on any diabetes medications for about 1 year.  Patient had recurrent hypoglycemia since presentation despite several amps of D50 and D5W, which was then increased to D10.   Patient then developed hypotension. Found to be septic due to influenza A.  Given hypoglycemia and hypotension, empiric stress dose steroids administered: Hydrocortisone 100mg IV q8h since 1/12 00:02.  Cortisol level checked before steroids started was low to 0.9 (1/11 23:56). Very low in setting of stress, consistent with adrenal insufficiency. 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    1/13/25 - family refused 6AM hydrocortisone out of concern for steroid psychosis while patient on hydrocortisone 100mg IV q8h. 1/12-1/13 overnight patient with agitation.  Extensive discussion with daughter at bedside and with another daughter Kaya ([phone]) by phone.  Hydrocortisone reduced to 50mg IV q8h, patient hemodynamically stable    Recommendations:  - Reduce hydrocortisone to 25mg IV q8h for now.   Discussed with patient optimal plan would be to keep above dose today and then reduce to Hydrocortisone 20mg PO x 1 at 8AM tomorrow 1/15, then hold steroid rest of the day on 1/15, followed by check 8AM "AM cortisol", ACTH and DHEAS at 8AM on 1/16. If following this plan discharge could be planned for 1/16 with or without steroid depending on testing results. Patient could be monitored carefully for vitals and glucose while off steroid.    If patient insists on leaving before 1/16 would not be able to retest HPA axis and would send home on hydrocortisone 20mg PO at 8AM and 10mg PO at 3PM and will need retesting of HPA axis as outpatient with Dr. Sher. If leaving on 1/15 would not hold the dose as outlined above and could start 20/10 regimen as outlined.    Initial hypoglycemia may have been due to adrenal insufficiency, now resolved on steroid.  Can stop IV dextrose and trend glucose off dextrose.    #Postoperative hypothyroidism  #Hx of papillary thyroid cancer  He also has remote hx of papillary thyroid cancer diagnosed in 1990s. Follows with Dr. Sher outpatient.  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  TSH 1.05-3.18, Free T4 mildly low 0.8  - Recommend continue Levothyroxine 150mcg daily  repeat TFT outpatient      Patient will need follow up with Dr. Sher on discharge. Currently has follow up appt scheduled with Dr. Sher on 6/2 at 11:40AM at [facility].      Complex patient high level decision making.  Discussed with primary team.    Verónica Gutierrez MD  Division of Endocrinology  Pager: [phone]    If after 6PM or before 9AM, or on weekends/holidays, please call endocrine answering service for assistance ([phone]).  For nonurgent matters email [email or web address] for assistance.
68 y/o M with pmhx of DM2, HTN, Stomach cancer, Thyroid cancer, presented to the ED for new onset altered mental status. Found to have flu positive. Also septic. Admit for IV abx and flu management, hypoglycemia work up.
68 y/o M with pmhx of DM2, HTN, Stomach cancer, Thyroid cancer, presented to the ED for new onset altered mental status. Found to have flu positive. Also septic. Admit for IV abx and flu management, hypoglycemia work up.

## 2025-01-14 NOTE — PROVIDER CONTACT NOTE (OTHER) - ASSESSMENT
Patient encouraged to void prior to bladder scan, no urine output.   Patient bladder scan, reading 716.   nondistended, voiced no complaints at this time.  vital signs stable.
patient a&Ox1-AMS since [place] was called around 2:30am. Pt refusing to swallow medications and participate in care stating" leave me alone". Refused to participate even with daughters attempts. Daughter requests to hold the steroid due to AMS.
patient a&Ox4, no s/s distress noted. Vitals as documented

## 2025-01-14 NOTE — SWALLOW BEDSIDE ASSESSMENT ADULT - ASR SWALLOW RECOMMEND DIAG
Consider Cinesophagram at MD's discretion if concerned chest imaging is dysphagia/ aspiration related.

## 2025-01-15 ENCOUNTER — TRANSCRIPTION ENCOUNTER (OUTPATIENT)
Age: 70
End: 2025-01-15

## 2025-01-15 VITALS
SYSTOLIC BLOOD PRESSURE: 123 MMHG | OXYGEN SATURATION: 96 % | HEART RATE: 55 BPM | DIASTOLIC BLOOD PRESSURE: 60 MMHG | RESPIRATION RATE: 17 BRPM | TEMPERATURE: 97 F

## 2025-01-15 LAB
ALBUMIN SERPL ELPH-MCNC: 3.9 G/DL — SIGNIFICANT CHANGE UP (ref 3.3–5)
ALP SERPL-CCNC: 49 U/L — SIGNIFICANT CHANGE UP (ref 40–120)
ALT FLD-CCNC: 31 U/L — SIGNIFICANT CHANGE UP (ref 4–41)
ANION GAP SERPL CALC-SCNC: 15 MMOL/L — HIGH (ref 7–14)
AST SERPL-CCNC: 53 U/L — HIGH (ref 4–40)
BILIRUB SERPL-MCNC: 0.5 MG/DL — SIGNIFICANT CHANGE UP (ref 0.2–1.2)
BUN SERPL-MCNC: 12 MG/DL — SIGNIFICANT CHANGE UP (ref 7–23)
CALCIUM SERPL-MCNC: 8 MG/DL — LOW (ref 8.4–10.5)
CHLORIDE SERPL-SCNC: 107 MMOL/L — SIGNIFICANT CHANGE UP (ref 98–107)
CO2 SERPL-SCNC: 19 MMOL/L — LOW (ref 22–31)
CREAT SERPL-MCNC: 0.62 MG/DL — SIGNIFICANT CHANGE UP (ref 0.5–1.3)
EGFR: 103 ML/MIN/1.73M2 — SIGNIFICANT CHANGE UP
GLUCOSE BLDC GLUCOMTR-MCNC: 120 MG/DL — HIGH (ref 70–99)
GLUCOSE BLDC GLUCOMTR-MCNC: 141 MG/DL — HIGH (ref 70–99)
GLUCOSE SERPL-MCNC: 120 MG/DL — HIGH (ref 70–99)
HCT VFR BLD CALC: 31.7 % — LOW (ref 39–50)
HGB BLD-MCNC: 10.3 G/DL — LOW (ref 13–17)
MAGNESIUM SERPL-MCNC: 1.9 MG/DL — SIGNIFICANT CHANGE UP (ref 1.6–2.6)
MCHC RBC-ENTMCNC: 27.2 PG — SIGNIFICANT CHANGE UP (ref 27–34)
MCHC RBC-ENTMCNC: 32.5 G/DL — SIGNIFICANT CHANGE UP (ref 32–36)
MCV RBC AUTO: 83.9 FL — SIGNIFICANT CHANGE UP (ref 80–100)
NRBC # BLD: 0 /100 WBCS — SIGNIFICANT CHANGE UP (ref 0–0)
NRBC # FLD: 0 K/UL — SIGNIFICANT CHANGE UP (ref 0–0)
PHOSPHATE SERPL-MCNC: 2.6 MG/DL — SIGNIFICANT CHANGE UP (ref 2.5–4.5)
PLATELET # BLD AUTO: 157 K/UL — SIGNIFICANT CHANGE UP (ref 150–400)
POTASSIUM SERPL-MCNC: 3.6 MMOL/L — SIGNIFICANT CHANGE UP (ref 3.5–5.3)
POTASSIUM SERPL-SCNC: 3.6 MMOL/L — SIGNIFICANT CHANGE UP (ref 3.5–5.3)
PROT SERPL-MCNC: 6.5 G/DL — SIGNIFICANT CHANGE UP (ref 6–8.3)
RBC # BLD: 3.78 M/UL — LOW (ref 4.2–5.8)
RBC # FLD: 14.4 % — SIGNIFICANT CHANGE UP (ref 10.3–14.5)
SODIUM SERPL-SCNC: 141 MMOL/L — SIGNIFICANT CHANGE UP (ref 135–145)
WBC # BLD: 4.55 K/UL — SIGNIFICANT CHANGE UP (ref 3.8–10.5)
WBC # FLD AUTO: 4.55 K/UL — SIGNIFICANT CHANGE UP (ref 3.8–10.5)

## 2025-01-15 PROCEDURE — 99239 HOSP IP/OBS DSCHRG MGMT >30: CPT

## 2025-01-15 PROCEDURE — 99232 SBSQ HOSP IP/OBS MODERATE 35: CPT

## 2025-01-15 RX ORDER — LISINOPRIL 30 MG/1
1 TABLET ORAL
Refills: 0 | DISCHARGE

## 2025-01-15 RX ORDER — HYDROCORTISONE 100 MG/60ML
10 ENEMA RECTAL ONCE
Refills: 0 | Status: DISCONTINUED | OUTPATIENT
Start: 2025-01-15 | End: 2025-01-15

## 2025-01-15 RX ORDER — HYDROCORTISONE 100 MG/60ML
1 ENEMA RECTAL
Qty: 90 | Refills: 0
Start: 2025-01-15

## 2025-01-15 RX ORDER — PANTOPRAZOLE 40 MG/1
1 TABLET, DELAYED RELEASE ORAL
Qty: 30 | Refills: 0
Start: 2025-01-15 | End: 2025-02-13

## 2025-01-15 RX ORDER — OSELTAMIVIR 75 MG/1
1 CAPSULE ORAL
Qty: 2 | Refills: 0
Start: 2025-01-15

## 2025-01-15 RX ORDER — FINASTERIDE 5 MG
1 TABLET ORAL
Qty: 0 | Refills: 0 | DISCHARGE
Start: 2025-01-15

## 2025-01-15 RX ORDER — TAMSULOSIN HYDROCHLORIDE 0.4 MG/1
2 CAPSULE ORAL
Refills: 0 | DISCHARGE

## 2025-01-15 RX ORDER — OSELTAMIVIR 75 MG/1
1 CAPSULE ORAL
Qty: 3 | Refills: 0
Start: 2025-01-15

## 2025-01-15 RX ORDER — HYDROCORTISONE 100 MG/60ML
20 ENEMA RECTAL ONCE
Refills: 0 | Status: COMPLETED | OUTPATIENT
Start: 2025-01-15 | End: 2025-01-15

## 2025-01-15 RX ORDER — HYDROCORTISONE 100 MG/60ML
1 ENEMA RECTAL
Qty: 45 | Refills: 0
Start: 2025-01-15

## 2025-01-15 RX ORDER — AMOXICILLIN/POTASSIUM CLAV 875-125 MG
875 TABLET ORAL
Qty: 8 | Refills: 0
Start: 2025-01-15 | End: 2025-01-18

## 2025-01-15 RX ORDER — TAMSULOSIN HYDROCHLORIDE 0.4 MG/1
2 CAPSULE ORAL
Qty: 60 | Refills: 0
Start: 2025-01-15 | End: 2025-02-13

## 2025-01-15 RX ADMIN — HYDROCORTISONE 20 MILLIGRAM(S): 100 ENEMA RECTAL at 08:46

## 2025-01-15 RX ADMIN — LEVOTHYROXINE SODIUM 150 MICROGRAM(S): 175 TABLET ORAL at 04:09

## 2025-01-15 RX ADMIN — HEPARIN SODIUM 5000 UNIT(S): 1000 INJECTION, SOLUTION INTRAVENOUS; SUBCUTANEOUS at 05:29

## 2025-01-15 RX ADMIN — PIPERACILLIN AND TAZOBACTAM 25 GRAM(S): 3; .375 INJECTION, POWDER, LYOPHILIZED, FOR SOLUTION INTRAVENOUS at 13:20

## 2025-01-15 RX ADMIN — HYDROCORTISONE 25 MILLIGRAM(S): 100 ENEMA RECTAL at 05:27

## 2025-01-15 RX ADMIN — CHLORHEXIDINE GLUCONATE 1 APPLICATION(S): 1.2 RINSE ORAL at 05:34

## 2025-01-15 RX ADMIN — PANTOPRAZOLE 40 MILLIGRAM(S): 40 TABLET, DELAYED RELEASE ORAL at 05:29

## 2025-01-15 RX ADMIN — SUCRALFATE 1 GRAM(S): 1 SUSPENSION ORAL at 05:29

## 2025-01-15 RX ADMIN — PIPERACILLIN AND TAZOBACTAM 25 GRAM(S): 3; .375 INJECTION, POWDER, LYOPHILIZED, FOR SOLUTION INTRAVENOUS at 05:23

## 2025-01-15 RX ADMIN — OSELTAMIVIR 75 MILLIGRAM(S): 75 CAPSULE ORAL at 05:29

## 2025-01-15 RX ADMIN — Medication 5 MILLIGRAM(S): at 13:23

## 2025-01-15 NOTE — PROGRESS NOTE ADULT - PROBLEM SELECTOR PROBLEM 3
History of immunotherapy
Adrenal insufficiency
Secondary adrenal insufficiency
History of immunotherapy
Adrenal insufficiency
Adrenal insufficiency

## 2025-01-15 NOTE — DISCHARGE NOTE NURSING/CASE MANAGEMENT/SOCIAL WORK - FINANCIAL ASSISTANCE
[facility] provides services at a reduced cost to those who are determined to be eligible through [facility]’s financial assistance program. Information regarding [facility]’s financial assistance program can be found by going to [email or web address] or by calling [phone].

## 2025-01-15 NOTE — DISCHARGE NOTE PROVIDER - NSDCMRMEDTOKEN_GEN_ALL_CORE_FT
levothyroxine 150 mcg (0.15 mg) oral tablet: 1 tab(s) orally once a day in the morning  lisinopril 2.5 mg oral tablet: 1 tab(s) orally once a day (90-day supply last dispensed in June 2024)  sucralfate 1 g oral tablet: 1 tab(s) orally once a day (per pharmacy, directions state to take 1g tablet 2 times a day; per spouse patient only takes 1g once daily; 90-day supply last dispensed in June 2024)  tamsulosin 0.4 mg oral capsule: 2 cap(s) orally once a day (at bedtime) (90-day supply last dispensed in June 2024)   amoxicillin-clavulanate 875 mg-125 mg oral tablet: 875 milligram(s) orally 2 times a day  finasteride 5 mg oral tablet: 1 tab(s) orally once a day  hydrocortisone 10 mg oral tablet: 1 tab(s) orally 2 times a day please take 20mg in am 8am and 10mg at 3pm  levothyroxine 150 mcg (0.15 mg) oral tablet: 1 tab(s) orally once a day in the morning  oseltamivir 75 mg oral capsule: 1 cap(s) orally 2 times a day  pantoprazole 40 mg oral delayed release tablet: 1 tab(s) orally once a day (before a meal)  sucralfate 1 g oral tablet: 1 tab(s) orally once a day (per pharmacy, directions state to take 1g tablet 2 times a day; per spouse patient only takes 1g once daily; 90-day supply last dispensed in June 2024)  tamsulosin 0.4 mg oral capsule: 2 cap(s) orally once a day (at bedtime)   amoxicillin-clavulanate 875 mg-125 mg oral tablet: 875 milligram(s) orally 2 times a day  finasteride 5 mg oral tablet: 1 tab(s) orally once a day  hydrocortisone 10 mg oral tablet: 1 tab(s) orally 2 times a day please take 20mg in am 8am and 10mg at 3pm please start on 1/16  levothyroxine 150 mcg (0.15 mg) oral tablet: 1 tab(s) orally once a day in the morning  oseltamivir 75 mg oral capsule: 1 cap(s) orally 2 times a day  pantoprazole 40 mg oral delayed release tablet: 1 tab(s) orally once a day (before a meal)  sucralfate 1 g oral tablet: 1 tab(s) orally once a day (per pharmacy, directions state to take 1g tablet 2 times a day; per spouse patient only takes 1g once daily; 90-day supply last dispensed in June 2024)  tamsulosin 0.4 mg oral capsule: 2 cap(s) orally once a day (at bedtime)

## 2025-01-15 NOTE — CHART NOTE - NSCHARTNOTEFT_GEN_A_CORE
received call from patients pharmacy they do not have hydrocortisone 10mg available changed order to 20mg tablets

## 2025-01-15 NOTE — SWALLOW VFSS/MBS ASSESSMENT ADULT - COMMENTS
Medicine Note 1/14/2025 - 68 y/o M with pmhx of DM2, HTN, Stomach cancer, Thyroid cancer, presented to the ED for new onset altered mental status. Found to have flu positive. Also septic. Admit for IV abx and flu management, hypoglycemia work up.    Of Note: Patient was seen for a Clinical Swallow Evaluation on 1/14/2025 (See Consult for details) with recommendations for Regular and Thin liquids. Consider Cinesophagram at MD's discretion if concerned chest imaging is dysphagia/aspiration related.     Radiology department sent for Patient for Cinesophagram. However, Patient refused.  SLP informed ACP (Dahlia Pager#[phone]) regarding Patient's refusal, therefore Cinesophagram is cancelled/discontinued. This service to sign off. Reconsult if there is a change in medical status.

## 2025-01-15 NOTE — DISCHARGE NOTE PROVIDER - NSDCFUADDAPPT_GEN_ALL_CORE_FT
Lung Nodule RLL - f/u CT chest in 6 months    -will need follow up with Dr. Sher on discharge. Currently has follow up appt scheduled with Dr. Sher on 6/2 at 11:40AM at [facility].  -repeat TFTs outpatient  -need retesting of HPA axis as outpatient with Dr. Sher.     friedman catheter placed for urinary retention follow up with your urologist for further management and care - Dr Estes

## 2025-01-15 NOTE — DISCHARGE NOTE NURSING/CASE MANAGEMENT/SOCIAL WORK - NSDCFUADDAPPT_GEN_ALL_CORE_FT
Lung Nodule RLL - f/u CT chest in 6 months    -will need follow up with Dr. Sher on discharge. Currently has follow up appt scheduled with Dr. Sher on 6/2 at 11:40AM at [facility].  -repeat TFTs outpatient  -need retesting of HPA axis as outpatient with Dr. Sher.     friedman catheter placed for urinary retention follow up with your urologist for further management and care - Dr Estes

## 2025-01-15 NOTE — DISCHARGE NOTE PROVIDER - PROVIDER RX CONTACT NUMBER
Problem: ANTEPARTUM  Goal: Maintain pregnancy as long as maternal and/or fetal condition is stable  Description: INTERVENTIONS:  - Maternal surveillance  - Fetal surveillance  - Monitor uterine activity  - Medications as ordered  - Bedrest  Outcome: Progressing [phone]

## 2025-01-15 NOTE — DISCHARGE NOTE PROVIDER - HOSPITAL COURSE
68 y/o M with pmhx of DM2, HTN, Stomach cancer, Thyroid cancer, presented to the ED for new onset altered mental status. Found to be flu positive. Also septic. Admit for IV abx and flu management, hypoglycemia work up.    ·  Problem: Severe sepsis POA d/t Influenza/Pneumonia, sepsis resolved    ·  Plan: - CT chest: Right lower lobe 0.4 cm peripheral nodular opacity with mild surrounding tree-in-bud nodular opacities  - on Zosyn, will transition to Augmentin and complete 7 day course    - complete course of Tamiflu   - MRSA swab neg  - urine legionella neg   - blood cultures NGTD  - repeat CT chest in 6 months.    ·  Problem: Influenza A.   ·  Plan: - continue Tamiflu    ·  Problem: Adrenal insufficiency.   ·  Plan: - c/f adrenal insufficiency given low Cortisol level and hypotension/hypoglycemia   - endo recs appreciated  - Cortisol level 0.9 is very low in setting of stress  - s/p IV hydrocortisone taper, will discharge patient on Hydrocortisone 20mg PO at 8AM and 10mg PO at 3PM  - needs retesting of HPA axis as outpatient   - has appointment with Endocrine on 1/24/25     ·  Problem: Toxic metabolic encephalopathy.   ·  Plan: - likely d/t acute infection and possible steroid induced psychosis   - CT head w/ no acute findings   - dose of steroids decreased as per Endo   - avoid benzos, anticholinergics   - antibiotics as above  - mental status improved, returned to baseline     ·  Problem: Hypoglycemia.   ·  Plan: - the patient presented with persistent hypoglycemia likely 2/2 adrenal insufficiency, now resolved   - c peptide 1.9, BHB negative  - endocrine recs appreciated  - treat adrenal insufficiency as above.    ·  Problem: Urinary retention.   ·  Plan: - has h/o BPH   - s/p Morales   - passed TOV  - c/w Flomax 0.8mg qhs and Finasteride   - outpatient urology f/up     ·  Problem: Hypothyroidism.   ·  Plan: - patient with hx of hypothyroidism secondary to complete thyroidectomy  - endocrine recs appreciated  - continue synthroid 150 mcg.    ·  Problem: Severe protein calorie malnutrition   ·  Plan: - seen by nutritionist   - encourage PO intake        70 y/o M with pmhx of DM2, HTN, Stomach cancer, Thyroid cancer, presented to the ED for new onset altered mental status. Found to be flu positive. Also septic. Admit for IV abx and flu management, hypoglycemia work up.    ·  Problem: Severe sepsis POA d/t Influenza/Pneumonia, sepsis resolved    ·  Plan: - CT chest: Right lower lobe 0.4 cm peripheral nodular opacity with mild surrounding tree-in-bud nodular opacities  - on Zosyn, will transition to Augmentin and complete 7 day course    - complete course of Tamiflu   - MRSA swab neg  - urine legionella neg   - blood cultures NGTD  - repeat CT chest in 6 months.    ·  Problem: Influenza A.   ·  Plan: - continue Tamiflu    ·  Problem: Adrenal insufficiency.   ·  Plan: - c/f adrenal insufficiency given low Cortisol level and hypotension/hypoglycemia   - endo recs appreciated  - Cortisol level 0.9 is very low in setting of stress  - s/p IV hydrocortisone taper, will discharge patient on Hydrocortisone 20mg PO at 8AM and 10mg PO at 3PM  - needs retesting of HPA axis as outpatient   - has appointment with Endocrine on 1/24/25     ·  Problem: Toxic metabolic encephalopathy.   ·  Plan: - likely d/t acute infection and possible steroid induced psychosis   - CT head w/ no acute findings   - dose of steroids decreased as per Endo   - avoid benzos, anticholinergics   - antibiotics as above  - mental status improved, returned to baseline     ·  Problem: Hypoglycemia.   ·  Plan: - the patient presented with persistent hypoglycemia likely 2/2 adrenal insufficiency, now resolved   - c peptide 1.9, BHB negative  - endocrine recs appreciated  - treat adrenal insufficiency as above.    ·  Problem: Urinary retention.   ·  Plan: - has h/o BPH   - requiring Morales placement   - failed TOV and Morales replaced   - c/w Flomax 0.8mg qhs and Finasteride   - outpatient urology f/up     ·  Problem: Hypothyroidism.   ·  Plan: - patient with hx of hypothyroidism secondary to complete thyroidectomy  - endocrine recs appreciated  - continue synthroid 150 mcg.    ·  Problem: Severe protein calorie malnutrition   ·  Plan: - seen by nutritionist   - encourage PO intake

## 2025-01-15 NOTE — DISCHARGE NOTE PROVIDER - NSDCCPCAREPLAN_GEN_ALL_CORE_FT
PRINCIPAL DISCHARGE DIAGNOSIS  Diagnosis: Influenza A  Assessment and Plan of Treatment: Complete course of Tamiflu.      SECONDARY DISCHARGE DIAGNOSES  Diagnosis: Sepsis due to pneumonia  Assessment and Plan of Treatment: Complete course of antibiotics as prescribed. Follow up with primary care doctor. Recommend repeating CT scan of chest in 6 months.    Diagnosis: Adrenal insufficiency  Assessment and Plan of Treatment: [place]ern for adrenal insufficiency. Continue Hydrocortisone 20mg at 8 AM and 10mg at 3 PM. Follow up with Endocrine on 1/24/25.    Diagnosis: Urinary retention  Assessment and Plan of Treatment: Due to enlarged prostate. Continue Flomax and Finasteride. Follow up with urology as outpatient.    Diagnosis: Hypothyroidism  Assessment and Plan of Treatment: Continue Synthroid.     PRINCIPAL DISCHARGE DIAGNOSIS  Diagnosis: Influenza A  Assessment and Plan of Treatment: Complete course of Tamiflu.      SECONDARY DISCHARGE DIAGNOSES  Diagnosis: Sepsis due to pneumonia  Assessment and Plan of Treatment: Complete course of antibiotics as prescribed. Follow up with primary care doctor. Recommend repeating CT scan of chest in 6 months.    Diagnosis: Adrenal insufficiency  Assessment and Plan of Treatment: [place]ern for adrenal insufficiency. Continue Hydrocortisone 20mg at 8 AM and 10mg at 3 PM. Follow up with Endocrine on 1/24/25.    Diagnosis: Urinary retention  Assessment and Plan of Treatment: Due to enlarged prostate. Continue Flomax and Finasteride. Plan to discharge with Morales catheter. Follow up with urology as outpatient in 1-2 weeks for trial of void.    Diagnosis: Hypothyroidism  Assessment and Plan of Treatment: Continue Synthroid.

## 2025-01-15 NOTE — DISCHARGE NOTE PROVIDER - NSDCFUSCHEDAPPT_GEN_ALL_CORE_FT
Nadeem Sher  [facility]  ENDOCRIN [address]  Scheduled Appointment: 01/24/2025    Albert Mercedes  [facility]  Elle Young  Scheduled Appointment: 03/05/2025

## 2025-01-15 NOTE — PROGRESS NOTE ADULT - PROBLEM SELECTOR PROBLEM 1
Secondary adrenal insufficiency
Secondary adrenal insufficiency
Hypoglycemia
Sepsis due to pneumonia

## 2025-01-15 NOTE — DISCHARGE NOTE PROVIDER - CARE PROVIDER_API CALL
Delbert Roy  Internal Medicine  [address] [place] [id number]  Phone: [phone]  Fax: [phone]  Follow Up Time:     Nadeem Sher (DO)  Endocrinology/Metab/Diabetes  [address] [place] [address]  Phone: [phone]  Fax: [phone]  Follow Up Time:

## 2025-01-15 NOTE — PROGRESS NOTE ADULT - SUBJECTIVE AND OBJECTIVE BOX
HPI:    Patient is a 69y old  Male who presents with a chief complaint of AMS (14 Jan 2025 15:12)  Endocrinology consulted due to concern for adrenal insufficiency      INTERVAL HPI/OVERNIGHT EVENTS:  Seen at the bedside. Tolerating diet well, denies nausea or vomiting.   Received 20 mg Hydrocortisone this morning  BG have remained stable ranging 120-157 mg/dL over past 24 hours, no hypoglycemia.  BP readings stable- ranging 119-136 systolic / 60-80s diastolic      CAPILLARY BLOOD GLUCOSE  POCT Blood Glucose.: 141 mg/dL (15 Jamel 2025 12:37)  POCT Blood Glucose.: 120 mg/dL (15 Jamel 2025 07:38)  POCT Blood Glucose.: 126 mg/dL (14 Jan 2025 22:18)  POCT Blood Glucose.: 157 mg/dL (14 Jan 2025 17:27)       MEDICATIONS  (STANDING):  chlorhexidine 2% Cloths 1 Application(s) Topical <User Schedule>  dextrose 5%. 1000 milliLiter(s) (100 mL/Hr) IV Continuous <Continuous>  dextrose 5%. 1000 milliLiter(s) (50 mL/Hr) IV Continuous <Continuous>  dextrose 50% Injectable 25 Gram(s) IV Push once  dextrose 50% Injectable 12.5 Gram(s) IV Push once  dextrose 50% Injectable 25 Gram(s) IV Push once  finasteride 5 milliGRAM(s) Oral daily  glucagon  Injectable 1 milliGRAM(s) IntraMuscular once  heparin   Injectable 5000 Unit(s) SubCutaneous every 12 hours  levothyroxine 150 MICROGram(s) Oral daily  oseltamivir 75 milliGRAM(s) Oral two times a day  pantoprazole    Tablet 40 milliGRAM(s) Oral before breakfast  piperacillin/tazobactam IVPB.. 3.375 Gram(s) IV Intermittent every 8 hours  sucralfate 1 Gram(s) Oral two times a day  tamsulosin 0.8 milliGRAM(s) Oral at bedtime    MEDICATIONS  (PRN):  dextrose Oral Gel 15 Gram(s) Oral once PRN Blood Glucose LESS THAN 70 milliGRAM(s)/deciliter      PHYSICAL EXAM  Vital Signs Last 24 Hrs  T(C): 36.3 (15 Jamel 2025 11:04), Max: 36.8 (14 Jan 2025 14:20)  T(F): 97.3 (15 Jamel 2025 11:04), Max: 98.2 (14 Jan 2025 14:20)  HR: 55 (15 Jamel 2025 11:04) (55 - 66)  BP: 123/60 (15 Jamel 2025 11:04) (112/65 - 136/84)  BP(mean): --  RR: 17 (15 Jamel 2025 11:04) (15 - 18)  SpO2: 96% (15 Jamel 2025 11:04) (96% - 100%)    Parameters below as of 15 Jamel 2025 11:04  Patient On (Oxygen Delivery Method): room air        GENERAL: Male laying in bed in NAD  RESPIRATORY: nonlabored breathing, no accessory muscle use  Extremities: Warm, no edema in all 4 exts   NEURO: A&O X3    LABS:                        10.3   4.55  )-----------( 157      ( 15 Jamel 2025 07:00 )             31.7     01-15    141  |  107  |  12  ----------------------------<  120[H]  3.6   |  19[L]  |  0.62    Ca    8.0[L]      15 Jamel 2025 07:00  Phos  2.6     01-15  Mg     1.90     01-15    TPro  6.5  /  Alb  3.9  /  TBili  0.5  /  DBili  x   /  AST  53[H]  /  ALT  31  /  AlkPhos  49  01-15      Urinalysis Basic - ( 15 Jamel 2025 07:00 )    Color: x / Appearance: x / SG: x / pH: x  Gluc: 120 mg/dL / Ketone: x  / Bili: x / Urobili: x   Blood: x / Protein: x / Nitrite: x   Leuk Esterase: x / RBC: x / WBC x   Sq Epi: x / Non Sq Epi: x / Bacteria: x      Thyroid Stimulating Hormone, Serum: 1.05 uIU/mL (01-11 @ 23:43)  Thyroid Stimulating Hormone, Serum: 3.18 uIU/mL (01-11 @ 10:33)       A/P: 68 y/o M with Gastric cancer s/p chemo and immunotherapy, remote hx of papillary thyroid cancer, DM2 in remission, HTN who presents with AMS. He was found down by family at home. Normally AOx3. Upon presentation, found to be septic, hypoglycemic, and hypotensive. Septic due to Influenza A.    Endocrine consulted for: Hypoglycemia    #Hypoglycemia  #Hypotension  #Adrenal insufficiency  History obtained from daughter at bedside due to patient's confusion  Patient has hx of gastric cancer and is seen by oncologist Dr. Albert Mercedes.  He was previously on chemotherapy and Pembrolizumab (completed treatment 12/2023).  He has no prior known diagnosis of adrenal insufficiency.  He previously had T2DM but after significant weight loss with gastric cancer, DM in remission, has not been on any diabetes medications for about 1 year.  Patient had recurrent hypoglycemia since presentation despite several amps of D50 and D5W, which was then increased to D10.   Patient then developed hypotension. Found to be septic due to influenza A.  Given hypoglycemia and hypotension, empiric stress dose steroids administered: Hydrocortisone 100mg IV q8h since 1/12 00:02.  Cortisol level checked before steroids started was low to 0.9 (1/11 23:56). Very low in setting of stress, consistent with adrenal insufficiency. 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    1/13/25 - family refused 6AM hydrocortisone out of concern for steroid psychosis while patient on hydrocortisone 100mg IV q8h. 1/12-1/13 overnight patient with agitation.  Extensive discussion with daughter at bedside and with another daughter Kaya ([phone]) by phone.  Hydrocortisone reduced to 50mg IV q8h, patient hemodynamically stable    1/14/25- Received IV 25 mg HC q8h  1/15/25- Received AM 20 mg HC, holding afternoon dose in order to test HPA axis on 1/16/25    Recommendations:  - Hold afternoon dose of HC today (1/15/25)  - On 1/16/25, check 8 AM cortisol, ACTH, and DHEAS  - Pending lab results and stable vitals and blood sugars, plan could be for discharge on 1/16. Need for steroids on discharge to be determined based on lab results from 1/16/25    If patient insists on leaving before 1/16 would not be able to retest HPA axis and would send home on hydrocortisone 20mg PO at 8AM and 10mg PO at 3PM and will need retesting of HPA axis as outpatient with Dr. Sher.   If leaving on 1/15, would not hold afternoon HC dose as outlined above and could start 20/10 regimen as outlined.    Initial hypoglycemia may have been due to adrenal insufficiency, now resolved on steroid.    #Postoperative hypothyroidism  #Hx of papillary thyroid cancer  He also has remote hx of papillary thyroid cancer diagnosed in 1990s. Follows with Dr. Sher outpatient.  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  TSH 1.05-3.18, Free T4 mildly low 0.8  - Recommend continue Levothyroxine 150mcg daily  repeat TFTs outpatient      Patient will need follow up with Dr. Sher on discharge. Currently has follow up appt scheduled with Dr. Sher on 6/2 at 11:40AM at [facility].      Thank you for the consult. Will continue to monitor. Please inform the endocrinology team at least 24 hours prior to intended discharge date.     Contact via pager or [employer] Teams during business hours. For follow up questions, discharge recommendations, or new consults please call answering service at [phone] (weekdays), [phone] (nights/weekends). For nonurgent matters, please email  [email or web address].        Ashley Murillo D.O.  Attending Physician   Department of Endocrinology, Diabetes and Metabolism   [employer] Teams     For urgent matters before 9AM or after 5PM, or on weekends/holidays, please page the on call endocrine fellow.   For nonurgent matters, please email [email or web address] for assistance. HPI:    Patient is a 69y old  Male who presents with a chief complaint of AMS (14 Jan 2025 15:12)  Endocrinology consulted due to concern for adrenal insufficiency      INTERVAL HPI/OVERNIGHT EVENTS:  Seen at the bedside. Tolerating diet well, denies nausea or vomiting.   Received 20 mg Hydrocortisone this morning  BG have remained stable ranging 120-157 mg/dL over past 24 hours, no hypoglycemia.  BP readings stable- ranging 119-136 systolic / 60-80s diastolic  Patient wants to go home today, I discussed with patient and his daughter at bedside importance of taking hydrocortisone 20 mg qAM and 10 mg qafternoon until he has follow up with Dr. Sher    CAPILLARY BLOOD GLUCOSE  POCT Blood Glucose.: 141 mg/dL (15 Jamel 2025 12:37)  POCT Blood Glucose.: 120 mg/dL (15 Jamel 2025 07:38)  POCT Blood Glucose.: 126 mg/dL (14 Jan 2025 22:18)  POCT Blood Glucose.: 157 mg/dL (14 Jan 2025 17:27)       MEDICATIONS  (STANDING):  chlorhexidine 2% Cloths 1 Application(s) Topical <User Schedule>  dextrose 5%. 1000 milliLiter(s) (100 mL/Hr) IV Continuous <Continuous>  dextrose 5%. 1000 milliLiter(s) (50 mL/Hr) IV Continuous <Continuous>  dextrose 50% Injectable 25 Gram(s) IV Push once  dextrose 50% Injectable 12.5 Gram(s) IV Push once  dextrose 50% Injectable 25 Gram(s) IV Push once  finasteride 5 milliGRAM(s) Oral daily  glucagon  Injectable 1 milliGRAM(s) IntraMuscular once  heparin   Injectable 5000 Unit(s) SubCutaneous every 12 hours  levothyroxine 150 MICROGram(s) Oral daily  oseltamivir 75 milliGRAM(s) Oral two times a day  pantoprazole    Tablet 40 milliGRAM(s) Oral before breakfast  piperacillin/tazobactam IVPB.. 3.375 Gram(s) IV Intermittent every 8 hours  sucralfate 1 Gram(s) Oral two times a day  tamsulosin 0.8 milliGRAM(s) Oral at bedtime    MEDICATIONS  (PRN):  dextrose Oral Gel 15 Gram(s) Oral once PRN Blood Glucose LESS THAN 70 milliGRAM(s)/deciliter      PHYSICAL EXAM  Vital Signs Last 24 Hrs  T(C): 36.3 (15 Jamel 2025 11:04), Max: 36.8 (14 Jan 2025 14:20)  T(F): 97.3 (15 Jamel 2025 11:04), Max: 98.2 (14 Jan 2025 14:20)  HR: 55 (15 Jamel 2025 11:04) (55 - 66)  BP: 123/60 (15 Jamel 2025 11:04) (112/65 - 136/84)  BP(mean): --  RR: 17 (15 Jamel 2025 11:04) (15 - 18)  SpO2: 96% (15 Jamel 2025 11:04) (96% - 100%)    Parameters below as of 15 Jamel 2025 11:04  Patient On (Oxygen Delivery Method): room air        GENERAL: Male laying in bed in NAD  RESPIRATORY: nonlabored breathing, no accessory muscle use  Extremities: Warm, no edema in all 4 exts   NEURO: A&O X3    LABS:                        10.3   4.55  )-----------( 157      ( 15 Jamel 2025 07:00 )             31.7     01-15    141  |  107  |  12  ----------------------------<  120[H]  3.6   |  19[L]  |  0.62    Ca    8.0[L]      15 Jamel 2025 07:00  Phos  2.6     01-15  Mg     1.90     01-15    TPro  6.5  /  Alb  3.9  /  TBili  0.5  /  DBili  x   /  AST  53[H]  /  ALT  31  /  AlkPhos  49  01-15      Urinalysis Basic - ( 15 Jamel 2025 07:00 )    Color: x / Appearance: x / SG: x / pH: x  Gluc: 120 mg/dL / Ketone: x  / Bili: x / Urobili: x   Blood: x / Protein: x / Nitrite: x   Leuk Esterase: x / RBC: x / WBC x   Sq Epi: x / Non Sq Epi: x / Bacteria: x      Thyroid Stimulating Hormone, Serum: 1.05 uIU/mL (01-11 @ 23:43)  Thyroid Stimulating Hormone, Serum: 3.18 uIU/mL (01-11 @ 10:33)       A/P: 68 y/o M with Gastric cancer s/p chemo and immunotherapy, remote hx of papillary thyroid cancer, DM2 in remission, HTN who presents with AMS. He was found down by family at home. Normally AOx3. Upon presentation, found to be septic, hypoglycemic, and hypotensive. Septic due to Influenza A.    Endocrine consulted for: Hypoglycemia    #Hypoglycemia  #Hypotension  #Adrenal insufficiency  History obtained from daughter at bedside due to patient's confusion  Patient has hx of gastric cancer and is seen by oncologist Dr. Albert Mercedes.  He was previously on chemotherapy and Pembrolizumab (completed treatment 12/2023).  He has no prior known diagnosis of adrenal insufficiency.  He previously had T2DM but after significant weight loss with gastric cancer, DM in remission, has not been on any diabetes medications for about 1 year.  Patient had recurrent hypoglycemia since presentation despite several amps of D50 and D5W, which was then increased to D10.   Patient then developed hypotension. Found to be septic due to influenza A.  Given hypoglycemia and hypotension, empiric stress dose steroids administered: Hydrocortisone 100mg IV q8h since 1/12 00:02.  Cortisol level checked before steroids started was low to 0.9 (1/11 23:56). Very low in setting of stress, consistent with adrenal insufficiency. 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    1/13/25 - family refused 6AM hydrocortisone out of concern for steroid psychosis while patient on hydrocortisone 100mg IV q8h. 1/12-1/13 overnight patient with agitation.  Extensive discussion with daughter at bedside and with another daughter Kaya ([phone]) by phone.  Hydrocortisone reduced to 50mg IV q8h, patient hemodynamically stable    1/14/25- Received IV 25 mg HC q8h  1/15/25- Received AM 20 mg HC    Recommendations:  - As patient insists on leaving today, we will not be able to retest HPA axis  - Recommend giving 10 mg afternoon dose of HC now  - Discharge with hydrocortisone 20 mg PO at 8 AM and 10 mg PO at 3 PM. He will need retesting of HPA axis as outpatient with Dr. Sher. Follow up appt is scheduled for 1/24/25 at 10 AM  - Please include sick day instructions in discharge instructions: if patient is not feeling well or has illness, he should double dose of hydrocortisone  - Please also discharge with emergency IM solu-cortef kit to be used if patient is unable to take PO intake/ has severe vomiting    Initial hypoglycemia may have been due to adrenal insufficiency, now resolved on steroid.    #Postoperative hypothyroidism  #Hx of papillary thyroid cancer  He also has remote hx of papillary thyroid cancer diagnosed in 1990s. Follows with Dr. Sher outpatient.  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  TSH 1.05-3.18, Free T4 mildly low 0.8  - Recommend continue Levothyroxine 150mcg daily  repeat TFTs outpatient      Patient will need follow up with Dr. Sher on discharge. Currently has follow up appt scheduled with Dr. Sher on 1/24 at 10 AM at [facility].      Thank you for the consult. Will continue to monitor. Please inform the endocrinology team at least 24 hours prior to intended discharge date.     Contact via pager or [employer] Teams during business hours. For follow up questions, discharge recommendations, or new consults please call answering service at [phone] (weekdays), [phone] (nights/weekends). For nonurgent matters, please email  [email or web address].     Recommendations communicated via text page to pager [phone] Dahlia Murillo D.O.  Attending Physician   Department of Endocrinology, Diabetes and Metabolism   [employer] Teams     For urgent matters before 9AM or after 5PM, or on weekends/holidays, please page the on call endocrine fellow.   For nonurgent matters, please email [email or web address] for assistance.

## 2025-01-15 NOTE — DISCHARGE NOTE PROVIDER - DETAILS OF MALNUTRITION DIAGNOSIS/DIAGNOSES
This patient has been assessed with a concern for Malnutrition and was treated during this hospitalization for the following Nutrition diagnosis/diagnoses:     -  01/13/2025: Severe protein-calorie malnutrition   -  01/13/2025: Underweight (BMI < 19)

## 2025-01-15 NOTE — PROGRESS NOTE ADULT - PROBLEM SELECTOR PROBLEM 4
Hypothyroidism
Toxic metabolic encephalopathy
Hypoglycemia
Hypothyroidism
History of immunotherapy
Toxic metabolic encephalopathy

## 2025-01-15 NOTE — DISCHARGE NOTE PROVIDER - CARE PROVIDERS DIRECT ADDRESSES
[email or web address]

## 2025-01-15 NOTE — DISCHARGE NOTE NURSING/CASE MANAGEMENT/SOCIAL WORK - PATIENT PORTAL LINK FT
You can access the FollowMyHealth Patient Portal offered by [facility] by registering at the following website: [email or web address]. By joining [employer]’s FollowMyHealth portal, you will also be able to view your health information using other applications (apps) compatible with our system.

## 2025-01-16 LAB
CULTURE RESULTS: SIGNIFICANT CHANGE UP
CULTURE RESULTS: SIGNIFICANT CHANGE UP
SPECIMEN SOURCE: SIGNIFICANT CHANGE UP
SPECIMEN SOURCE: SIGNIFICANT CHANGE UP

## 2025-01-16 NOTE — PHARMACOTHERAPY INTERVENTION NOTE - COMMENTS
Discharge medications reviewed with the patient's wife over the phone. Current medication schedule was discussed in detail including: medication name, indication, dose, administration times, treatment duration, side effects, and special instructions. Patient's wife reports she already picked up all the medications from [facility] and was provided with a pillcutter for the hydrocortisone. Questions and concerns were answered and addressed. Patient's wife demonstrated understanding.     New medications: hydrocortisone, pantoprazole    Gurvinder HwangD, [facility]  Clinical Pharmacy Specialist  [id number]

## 2025-01-19 LAB
INSULIN FREE SERPL-ACNC: 3.3 UU/ML — SIGNIFICANT CHANGE UP
INSULIN: 3.3 UU/ML — SIGNIFICANT CHANGE UP

## 2025-01-22 LAB
CHLORPROPAMIDE RESULT: SIGNIFICANT CHANGE UP MCG/ML
GLIMEPIRIDE RESULT: SIGNIFICANT CHANGE UP NG/ML
GLIPIZIDE RESULT: SIGNIFICANT CHANGE UP NG/ML
GLYBURIDE RESULT: SIGNIFICANT CHANGE UP NG/ML
INSULIN ANTIBODIES: <5 UU/ML — SIGNIFICANT CHANGE UP
NATEGLINIDE RESULT: SIGNIFICANT CHANGE UP MCG/ML
PIOGLITAZONE RESULT: SIGNIFICANT CHANGE UP NG/ML
REPAGLINIDE RESULT: SIGNIFICANT CHANGE UP NG/ML
ROSIGLITAZONE RESULT: SIGNIFICANT CHANGE UP NG/ML
TOLAZAMIDE RESULT: SIGNIFICANT CHANGE UP MCG/ML
TOLBUTAMIDE RESULT: SIGNIFICANT CHANGE UP MCG/ML

## 2025-01-24 ENCOUNTER — APPOINTMENT (OUTPATIENT)
Dept: ENDOCRINOLOGY | Facility: CLINIC | Age: 70
End: 2025-01-24
Payer: MEDICARE

## 2025-01-24 VITALS
WEIGHT: 158.5 LBS | BODY MASS INDEX: 24.02 KG/M2 | SYSTOLIC BLOOD PRESSURE: 145 MMHG | HEIGHT: 68 IN | DIASTOLIC BLOOD PRESSURE: 83 MMHG | OXYGEN SATURATION: 99 % | HEART RATE: 59 BPM

## 2025-01-24 VITALS — SYSTOLIC BLOOD PRESSURE: 128 MMHG | DIASTOLIC BLOOD PRESSURE: 70 MMHG

## 2025-01-24 DIAGNOSIS — E27.40 UNSPECIFIED ADRENOCORTICAL INSUFFICIENCY: ICD-10-CM

## 2025-01-24 DIAGNOSIS — E55.9 VITAMIN D DEFICIENCY, UNSPECIFIED: ICD-10-CM

## 2025-01-24 DIAGNOSIS — E89.0 POSTPROCEDURAL HYPOTHYROIDISM: ICD-10-CM

## 2025-01-24 DIAGNOSIS — C73 MALIGNANT NEOPLASM OF THYROID GLAND: ICD-10-CM

## 2025-01-24 DIAGNOSIS — E11.9 TYPE 2 DIABETES MELLITUS W/OUT COMPLICATIONS: ICD-10-CM

## 2025-01-24 PROCEDURE — 99215 OFFICE O/P EST HI 40 MIN: CPT

## 2025-01-24 PROCEDURE — G2211 COMPLEX E/M VISIT ADD ON: CPT

## 2025-01-25 RX ORDER — TAMSULOSIN HYDROCHLORIDE 0.4 MG/1
0.4 CAPSULE ORAL
Refills: 0 | Status: ACTIVE | COMMUNITY

## 2025-01-25 RX ORDER — SUCRALFATE 1 G/1
1 TABLET ORAL
Refills: 0 | Status: ACTIVE | COMMUNITY

## 2025-01-25 RX ORDER — FINASTERIDE 1 MG/1
TABLET ORAL
Refills: 0 | Status: ACTIVE | COMMUNITY

## 2025-01-27 LAB
25(OH)D3 SERPL-MCNC: 12.8 NG/ML
ACTH SER-ACNC: 4.5 PG/ML
ALBUMIN SERPL ELPH-MCNC: 4.1 G/DL
ALP BLD-CCNC: 90 U/L
ALT SERPL-CCNC: 16 U/L
ANION GAP SERPL CALC-SCNC: 11 MMOL/L
AST SERPL-CCNC: 17 U/L
BILIRUB SERPL-MCNC: 0.2 MG/DL
BUN SERPL-MCNC: 15 MG/DL
CALCIUM SERPL-MCNC: 8.6 MG/DL
CHLORIDE SERPL-SCNC: 104 MMOL/L
CHOLEST SERPL-MCNC: 164 MG/DL
CO2 SERPL-SCNC: 29 MMOL/L
CORTIS SERPL-MCNC: 0.3 UG/DL
CREAT SERPL-MCNC: 0.65 MG/DL
CREAT SPEC-SCNC: 98 MG/DL
EGFR: 102 ML/MIN/1.73M2
GLUCOSE SERPL-MCNC: 60 MG/DL
HDLC SERPL-MCNC: 46 MG/DL
LDLC SERPL CALC-MCNC: 85 MG/DL
MICROALBUMIN 24H UR DL<=1MG/L-MCNC: 7.9 MG/DL
MICROALBUMIN/CREAT 24H UR-RTO: 81 MG/G
NONHDLC SERPL-MCNC: 118 MG/DL
POTASSIUM SERPL-SCNC: 3.9 MMOL/L
PROT SERPL-MCNC: 6.2 G/DL
SODIUM SERPL-SCNC: 144 MMOL/L
T4 FREE SERPL-MCNC: 1.2 NG/DL
THYROGLOB AB SERPL-ACNC: 18.8 IU/ML
THYROGLOB SERPL-MCNC: <0.1 NG/ML
TRIGL SERPL-MCNC: 199 MG/DL
TSH SERPL-ACNC: 6.76 UIU/ML

## 2025-01-27 RX ORDER — HYDROCORTISONE 5 MG/1
5 TABLET ORAL
Qty: 300 | Refills: 3 | Status: ACTIVE | COMMUNITY
Start: 2025-01-27 | End: 1900-01-01

## 2025-01-27 RX ORDER — ERGOCALCIFEROL 1.25 MG/1
1.25 MG CAPSULE, LIQUID FILLED ORAL
Qty: 4 | Refills: 5 | Status: ACTIVE | COMMUNITY
Start: 2025-01-27 | End: 1900-01-01

## 2025-03-03 ENCOUNTER — APPOINTMENT (OUTPATIENT)
Dept: CT IMAGING | Facility: IMAGING CENTER | Age: 70
End: 2025-03-03
Payer: MEDICARE

## 2025-03-03 ENCOUNTER — OUTPATIENT (OUTPATIENT)
Dept: OUTPATIENT SERVICES | Facility: HOSPITAL | Age: 70
LOS: 1 days | Discharge: HOME | End: 2025-03-03

## 2025-03-03 ENCOUNTER — OUTPATIENT (OUTPATIENT)
Dept: OUTPATIENT SERVICES | Facility: HOSPITAL | Age: 70
LOS: 1 days | End: 2025-03-03
Payer: MEDICARE

## 2025-03-03 DIAGNOSIS — Z98.890 OTHER SPECIFIED POSTPROCEDURAL STATES: Chronic | ICD-10-CM

## 2025-03-03 DIAGNOSIS — E89.0 POSTPROCEDURAL HYPOTHYROIDISM: Chronic | ICD-10-CM

## 2025-03-03 DIAGNOSIS — C16.9 MALIGNANT NEOPLASM OF STOMACH, UNSPECIFIED: ICD-10-CM

## 2025-03-03 PROCEDURE — 74177 CT ABD & PELVIS W/CONTRAST: CPT | Mod: 26

## 2025-03-03 PROCEDURE — 71260 CT THORAX DX C+: CPT | Mod: 26

## 2025-03-03 PROCEDURE — 74177 CT ABD & PELVIS W/CONTRAST: CPT

## 2025-03-03 PROCEDURE — 71260 CT THORAX DX C+: CPT

## 2025-03-05 ENCOUNTER — APPOINTMENT (OUTPATIENT)
Dept: HEMATOLOGY ONCOLOGY | Facility: CLINIC | Age: 70
End: 2025-03-05
Payer: MEDICARE

## 2025-03-10 ENCOUNTER — APPOINTMENT (OUTPATIENT)
Dept: HEMATOLOGY ONCOLOGY | Facility: CLINIC | Age: 70
End: 2025-03-10
Payer: MEDICARE

## 2025-03-10 VITALS
TEMPERATURE: 98 F | OXYGEN SATURATION: 95 % | SYSTOLIC BLOOD PRESSURE: 139 MMHG | HEART RATE: 69 BPM | BODY MASS INDEX: 24.33 KG/M2 | WEIGHT: 160 LBS | DIASTOLIC BLOOD PRESSURE: 77 MMHG | RESPIRATION RATE: 16 BRPM

## 2025-03-10 DIAGNOSIS — C16.9 MALIGNANT NEOPLASM OF STOMACH, UNSPECIFIED: ICD-10-CM

## 2025-03-10 PROCEDURE — 99214 OFFICE O/P EST MOD 30 MIN: CPT

## 2025-03-10 PROCEDURE — G2211 COMPLEX E/M VISIT ADD ON: CPT

## 2025-03-11 LAB — CEA SERPL-MCNC: 2.4 NG/ML

## 2025-06-09 ENCOUNTER — NON-APPOINTMENT (OUTPATIENT)
Age: 70
End: 2025-06-09

## 2025-06-09 ENCOUNTER — APPOINTMENT (OUTPATIENT)
Age: 70
End: 2025-06-09
Payer: MEDICARE

## 2025-06-09 PROCEDURE — 92014 COMPRE OPH EXAM EST PT 1/>: CPT

## 2025-06-09 PROCEDURE — 92202 OPSCPY EXTND ON/MAC DRAW: CPT

## 2025-06-09 PROCEDURE — 92134 CPTRZ OPH DX IMG PST SGM RTA: CPT

## 2025-06-11 ENCOUNTER — APPOINTMENT (OUTPATIENT)
Dept: ENDOCRINOLOGY | Facility: CLINIC | Age: 70
End: 2025-06-11
Payer: MEDICARE

## 2025-06-11 VITALS
BODY MASS INDEX: 24.1 KG/M2 | WEIGHT: 159 LBS | HEIGHT: 68 IN | OXYGEN SATURATION: 96 % | SYSTOLIC BLOOD PRESSURE: 117 MMHG | DIASTOLIC BLOOD PRESSURE: 71 MMHG | HEART RATE: 74 BPM

## 2025-06-11 LAB — HBA1C MFR BLD HPLC: 5.3

## 2025-06-11 PROCEDURE — G2211 COMPLEX E/M VISIT ADD ON: CPT

## 2025-06-11 PROCEDURE — 83036 HEMOGLOBIN GLYCOSYLATED A1C: CPT | Mod: QW

## 2025-06-11 PROCEDURE — 99214 OFFICE O/P EST MOD 30 MIN: CPT

## 2025-06-13 LAB
25(OH)D3 SERPL-MCNC: 21.5 NG/ML
ALBUMIN SERPL ELPH-MCNC: 4.3 G/DL
ALP BLD-CCNC: 67 U/L
ALT SERPL-CCNC: 15 U/L
ANION GAP SERPL CALC-SCNC: 15 MMOL/L
AST SERPL-CCNC: 26 U/L
BILIRUB SERPL-MCNC: 0.5 MG/DL
BUN SERPL-MCNC: 16 MG/DL
CALCIUM SERPL-MCNC: 9.2 MG/DL
CHLORIDE SERPL-SCNC: 105 MMOL/L
CHOLEST SERPL-MCNC: 139 MG/DL
CO2 SERPL-SCNC: 22 MMOL/L
CORTIS SERPL-MCNC: <0.1 UG/DL
CREAT SERPL-MCNC: 0.79 MG/DL
CREAT SPEC-SCNC: 196 MG/DL
EGFRCR SERPLBLD CKD-EPI 2021: 96 ML/MIN/1.73M2
GLUCOSE SERPL-MCNC: 71 MG/DL
HDLC SERPL-MCNC: 35 MG/DL
LDLC SERPL-MCNC: 87 MG/DL
MICROALBUMIN 24H UR DL<=1MG/L-MCNC: 6 MG/DL
MICROALBUMIN/CREAT 24H UR-RTO: 31 MG/G
NONHDLC SERPL-MCNC: 105 MG/DL
POTASSIUM SERPL-SCNC: 4.4 MMOL/L
PROT SERPL-MCNC: 7 G/DL
SODIUM SERPL-SCNC: 142 MMOL/L
T4 FREE SERPL-MCNC: 1.4 NG/DL
THYROGLOB AB SERPL-ACNC: 17.8 IU/ML
THYROGLOB SERPL-MCNC: <0.1 NG/ML
TRIGL SERPL-MCNC: 93 MG/DL
TSH SERPL-ACNC: 0.03 UIU/ML

## 2025-06-17 ENCOUNTER — OUTPATIENT (OUTPATIENT)
Dept: OUTPATIENT SERVICES | Facility: HOSPITAL | Age: 70
LOS: 1 days | End: 2025-06-17
Payer: MEDICARE

## 2025-06-17 ENCOUNTER — APPOINTMENT (OUTPATIENT)
Dept: CT IMAGING | Facility: IMAGING CENTER | Age: 70
End: 2025-06-17
Payer: MEDICARE

## 2025-06-17 DIAGNOSIS — C16.9 MALIGNANT NEOPLASM OF STOMACH, UNSPECIFIED: ICD-10-CM

## 2025-06-17 DIAGNOSIS — E89.0 POSTPROCEDURAL HYPOTHYROIDISM: Chronic | ICD-10-CM

## 2025-06-17 DIAGNOSIS — Z98.890 OTHER SPECIFIED POSTPROCEDURAL STATES: Chronic | ICD-10-CM

## 2025-06-17 PROCEDURE — 71260 CT THORAX DX C+: CPT

## 2025-06-17 PROCEDURE — 74177 CT ABD & PELVIS W/CONTRAST: CPT

## 2025-06-17 PROCEDURE — 74177 CT ABD & PELVIS W/CONTRAST: CPT | Mod: 26

## 2025-06-17 PROCEDURE — 71260 CT THORAX DX C+: CPT | Mod: 26

## 2025-06-18 ENCOUNTER — OUTPATIENT (OUTPATIENT)
Dept: OUTPATIENT SERVICES | Facility: HOSPITAL | Age: 70
LOS: 1 days | Discharge: HOME | End: 2025-06-18

## 2025-06-18 DIAGNOSIS — C16.9 MALIGNANT NEOPLASM OF STOMACH, UNSPECIFIED: ICD-10-CM

## 2025-06-18 DIAGNOSIS — E89.0 POSTPROCEDURAL HYPOTHYROIDISM: Chronic | ICD-10-CM

## 2025-06-18 DIAGNOSIS — Z98.890 OTHER SPECIFIED POSTPROCEDURAL STATES: Chronic | ICD-10-CM

## 2025-06-23 ENCOUNTER — APPOINTMENT (OUTPATIENT)
Dept: HEMATOLOGY ONCOLOGY | Facility: CLINIC | Age: 70
End: 2025-06-23
Payer: MEDICARE

## 2025-06-23 VITALS
SYSTOLIC BLOOD PRESSURE: 128 MMHG | WEIGHT: 160.05 LBS | DIASTOLIC BLOOD PRESSURE: 74 MMHG | RESPIRATION RATE: 17 BRPM | TEMPERATURE: 97.2 F | HEART RATE: 70 BPM | BODY MASS INDEX: 24.34 KG/M2 | OXYGEN SATURATION: 94 %

## 2025-06-23 PROCEDURE — 99213 OFFICE O/P EST LOW 20 MIN: CPT

## 2025-06-23 PROCEDURE — G2211 COMPLEX E/M VISIT ADD ON: CPT

## 2025-06-24 ENCOUNTER — NON-APPOINTMENT (OUTPATIENT)
Age: 70
End: 2025-06-24

## 2025-06-25 PROBLEM — M79.641 PAIN IN BOTH HANDS: Status: RESOLVED | Noted: 2023-04-19 | Resolved: 2025-06-25

## 2025-06-25 PROBLEM — Z51.11 ENCOUNTER FOR CHEMOTHERAPY MANAGEMENT: Status: RESOLVED | Noted: 2022-08-05 | Resolved: 2025-06-25

## 2025-06-25 PROBLEM — Z87.898 HISTORY OF SHORTNESS OF BREATH: Status: RESOLVED | Noted: 2022-02-22 | Resolved: 2025-06-25

## 2025-07-08 ENCOUNTER — APPOINTMENT (OUTPATIENT)
Dept: ENDOCRINOLOGY | Facility: CLINIC | Age: 70
End: 2025-07-08